# Patient Record
Sex: FEMALE | Race: WHITE | NOT HISPANIC OR LATINO | Employment: UNEMPLOYED | URBAN - METROPOLITAN AREA
[De-identification: names, ages, dates, MRNs, and addresses within clinical notes are randomized per-mention and may not be internally consistent; named-entity substitution may affect disease eponyms.]

---

## 2017-07-20 ENCOUNTER — HOSPITAL ENCOUNTER (EMERGENCY)
Facility: MEDICAL CENTER | Age: 22
End: 2017-07-20
Attending: EMERGENCY MEDICINE
Payer: MEDICAID

## 2017-07-20 VITALS
TEMPERATURE: 98 F | HEIGHT: 67 IN | DIASTOLIC BLOOD PRESSURE: 72 MMHG | RESPIRATION RATE: 14 BRPM | OXYGEN SATURATION: 99 % | WEIGHT: 170 LBS | BODY MASS INDEX: 26.68 KG/M2 | SYSTOLIC BLOOD PRESSURE: 124 MMHG | HEART RATE: 92 BPM

## 2017-07-20 DIAGNOSIS — R10.84 GENERALIZED ABDOMINAL PAIN: ICD-10-CM

## 2017-07-20 DIAGNOSIS — R11.0 NAUSEA: ICD-10-CM

## 2017-07-20 LAB
ALBUMIN SERPL BCP-MCNC: 4.6 G/DL (ref 3.2–4.9)
ALBUMIN/GLOB SERPL: 1.9 G/DL
ALP SERPL-CCNC: 107 U/L (ref 30–99)
ALT SERPL-CCNC: 9 U/L (ref 2–50)
ANION GAP SERPL CALC-SCNC: 9 MMOL/L (ref 0–11.9)
APPEARANCE UR: NORMAL
AST SERPL-CCNC: 10 U/L (ref 12–45)
BASOPHILS # BLD AUTO: 0.4 % (ref 0–1.8)
BASOPHILS # BLD: 0.04 K/UL (ref 0–0.12)
BILIRUB SERPL-MCNC: 0.2 MG/DL (ref 0.1–1.5)
BUN SERPL-MCNC: 7 MG/DL (ref 8–22)
CALCIUM SERPL-MCNC: 9.9 MG/DL (ref 8.5–10.5)
CHLORIDE SERPL-SCNC: 109 MMOL/L (ref 96–112)
CO2 SERPL-SCNC: 22 MMOL/L (ref 20–33)
COLOR UR AUTO: YELLOW
CREAT SERPL-MCNC: 0.49 MG/DL (ref 0.5–1.4)
EOSINOPHIL # BLD AUTO: 0.16 K/UL (ref 0–0.51)
EOSINOPHIL NFR BLD: 1.7 % (ref 0–6.9)
ERYTHROCYTE [DISTWIDTH] IN BLOOD BY AUTOMATED COUNT: 38 FL (ref 35.9–50)
GFR SERPL CREATININE-BSD FRML MDRD: >60 ML/MIN/1.73 M 2
GLOBULIN SER CALC-MCNC: 2.4 G/DL (ref 1.9–3.5)
GLUCOSE SERPL-MCNC: 102 MG/DL (ref 65–99)
GLUCOSE UR STRIP.AUTO-MCNC: NEGATIVE MG/DL
HCG UR QL: NEGATIVE
HCT VFR BLD AUTO: 44.8 % (ref 37–47)
HGB BLD-MCNC: 15 G/DL (ref 12–16)
IMM GRANULOCYTES # BLD AUTO: 0.01 K/UL (ref 0–0.11)
IMM GRANULOCYTES NFR BLD AUTO: 0.1 % (ref 0–0.9)
KETONES UR STRIP.AUTO-MCNC: NEGATIVE MG/DL
LEUKOCYTE ESTERASE UR QL STRIP.AUTO: NEGATIVE
LIPASE SERPL-CCNC: 23 U/L (ref 11–82)
LYMPHOCYTES # BLD AUTO: 3.41 K/UL (ref 1–4.8)
LYMPHOCYTES NFR BLD: 37 % (ref 22–41)
MCH RBC QN AUTO: 29.2 PG (ref 27–33)
MCHC RBC AUTO-ENTMCNC: 33.5 G/DL (ref 33.6–35)
MCV RBC AUTO: 87.2 FL (ref 81.4–97.8)
MONOCYTES # BLD AUTO: 0.44 K/UL (ref 0–0.85)
MONOCYTES NFR BLD AUTO: 4.8 % (ref 0–13.4)
NEUTROPHILS # BLD AUTO: 5.15 K/UL (ref 2–7.15)
NEUTROPHILS NFR BLD: 56 % (ref 44–72)
NITRITE UR QL STRIP.AUTO: NEGATIVE
NRBC # BLD AUTO: 0 K/UL
NRBC BLD AUTO-RTO: 0 /100 WBC
PH UR STRIP.AUTO: 7 [PH] (ref 5–8)
PLATELET # BLD AUTO: 200 K/UL (ref 164–446)
PMV BLD AUTO: 11.3 FL (ref 9–12.9)
POTASSIUM SERPL-SCNC: 4 MMOL/L (ref 3.6–5.5)
PROT SERPL-MCNC: 7 G/DL (ref 6–8.2)
PROT UR QL STRIP: NEGATIVE MG/DL
RBC # BLD AUTO: 5.14 M/UL (ref 4.2–5.4)
RBC UR QL AUTO: NEGATIVE
SODIUM SERPL-SCNC: 140 MMOL/L (ref 135–145)
SP GR UR STRIP.AUTO: 1.01
WBC # BLD AUTO: 9.2 K/UL (ref 4.8–10.8)

## 2017-07-20 PROCEDURE — 81002 URINALYSIS NONAUTO W/O SCOPE: CPT

## 2017-07-20 PROCEDURE — 700111 HCHG RX REV CODE 636 W/ 250 OVERRIDE (IP): Performed by: EMERGENCY MEDICINE

## 2017-07-20 PROCEDURE — 85025 COMPLETE CBC W/AUTO DIFF WBC: CPT

## 2017-07-20 PROCEDURE — 81025 URINE PREGNANCY TEST: CPT

## 2017-07-20 PROCEDURE — 99284 EMERGENCY DEPT VISIT MOD MDM: CPT

## 2017-07-20 PROCEDURE — 83690 ASSAY OF LIPASE: CPT

## 2017-07-20 PROCEDURE — 36415 COLL VENOUS BLD VENIPUNCTURE: CPT

## 2017-07-20 PROCEDURE — 80053 COMPREHEN METABOLIC PANEL: CPT

## 2017-07-20 RX ORDER — ONDANSETRON 4 MG/1
4 TABLET, ORALLY DISINTEGRATING ORAL ONCE
Status: COMPLETED | OUTPATIENT
Start: 2017-07-20 | End: 2017-07-20

## 2017-07-20 RX ORDER — ONDANSETRON 4 MG/1
4 TABLET, ORALLY DISINTEGRATING ORAL EVERY 8 HOURS PRN
Qty: 10 TAB | Refills: 0 | Status: SHIPPED | OUTPATIENT
Start: 2017-07-20 | End: 2017-10-17

## 2017-07-20 RX ADMIN — ONDANSETRON 4 MG: 4 TABLET, ORALLY DISINTEGRATING ORAL at 22:08

## 2017-07-20 NOTE — ED AVS SNAPSHOT
Home Care Instructions                                                                                                                Karlie Santos   MRN: [id number]    Department:  [facility], Emergency Dept   Date of Visit:  7/20/2017            [facility], Emergency Dept    [address] [address]    Phone:  [phone]      You were seen by     Nicolasa Esquivel M.D.      Your Diagnosis Was     Nausea     R11.0       Follow-up Information     1. Schedule an appointment as soon as possible for a visit with Supriya Macias M.D..    Specialty:  Family Medicine    Contact information    [address]  [address]  [facility] [address]  [phone]          2. Follow up with [facility], Emergency Dept.    Specialty:  Emergency Medicine    Why:  If symptoms worsen    Contact information    [address] [address] [phone]      Medication Information     Review all of your home medications and newly ordered medications with your primary doctor and/or pharmacist as soon as possible. Follow medication instructions as directed by your doctor and/or pharmacist.     Please keep your complete medication list with you and share with your physician. Update the information when medications are discontinued, doses are changed, or new medications (including over-the-counter products) are added; and carry medication information at all times in the event of emergency situations.               Medication List      START taking these medications        Instructions    Morning Afternoon Evening Bedtime    ondansetron 4 MG Tbdp   Commonly known as:  ZOFRAN ODT        Take 1 Tab by mouth every 8 hours as needed for Nausea/Vomiting.   Dose:  4 mg                          ASK your doctor about these medications        Instructions    Morning Afternoon Evening Bedtime     MG Caps        Take 100 mg by mouth 2 times a day as needed for  Constipation.   Dose:  100 mg                        oxycodone-acetaminophen 5-325 MG Tabs   Commonly known as:  PERCOCET        Take 1 Tab by mouth every four hours as needed for Moderate Pain ((Pain Scale 4-6)).   Dose:  1 Tab                        PRENATAL 1 PO        Take  by mouth.                             Where to Get Your Medications      You can get these medications from any pharmacy     Bring a paper prescription for each of these medications    - ondansetron 4 MG Tbdp            Procedures and tests performed during your visit     CARDIAC MONITORING    CBC WITH DIFFERENTIAL    COMP METABOLIC PANEL    ESTIMATED GFR    LIPASE    O2 Protocol    POC URINALYSIS    POC URINE PREGNANCY    SALINE LOCK        Discharge Instructions       Abdominal Pain, Women  Abdominal (stomach, pelvic, or belly) pain can be caused by many things. It is important to tell your doctor:  · The location of the pain.  · Does it come and go or is it present all the time?  · Are there things that start the pain (eating certain foods, exercise)?  · Are there other symptoms associated with the pain (fever, nausea, vomiting, diarrhea)?  All of this is helpful to know when trying to find the cause of the pain.  CAUSES   · Stomach: virus or bacteria infection, or ulcer.  · Intestine: appendicitis (inflamed appendix), regional ileitis (Crohn's disease), ulcerative colitis (inflamed colon), irritable bowel syndrome, diverticulitis (inflamed diverticulum of the colon), or cancer of the stomach or intestine.  · Gallbladder disease or stones in the gallbladder.  · Kidney disease, kidney stones, or infection.  · Pancreas infection or cancer.  · Fibromyalgia (pain disorder).  · Diseases of the female organs:  · Uterus: fibroid (non-cancerous) tumors or infection.  · Fallopian tubes: infection or tubal pregnancy.  · Ovary: cysts or tumors.  · Pelvic adhesions (scar tissue).  · Endometriosis (uterus lining tissue growing in the pelvis and on the  pelvic organs).  · Pelvic congestion syndrome (female organs filling up with blood just before the menstrual period).  · Pain with the menstrual period.  · Pain with ovulation (producing an egg).  · Pain with an IUD (intrauterine device, birth control) in the uterus.  · Cancer of the female organs.  · Functional pain (pain not caused by a disease, may improve without treatment).  · Psychological pain.  · Depression.  DIAGNOSIS   Your doctor will decide the seriousness of your pain by doing an examination.  · Blood tests.  · X-rays.  · Ultrasound.  · CT scan (computed tomography, special type of X-ray).  · MRI (magnetic resonance imaging).  · Cultures, for infection.  · Barium enema (dye inserted in the large intestine, to better view it with X-rays).  · Colonoscopy (looking in intestine with a lighted tube).  · Laparoscopy (minor surgery, looking in abdomen with a lighted tube).  · Major abdominal exploratory surgery (looking in abdomen with a large incision).  TREATMENT   The treatment will depend on the cause of the pain.   · Many cases can be observed and treated at home.  · Over-the-counter medicines recommended by your caregiver.  · Prescription medicine.  · Antibiotics, for infection.  · Birth control pills, for painful periods or for ovulation pain.  · Hormone treatment, for endometriosis.  · Nerve blocking injections.  · Physical therapy.  · Antidepressants.  · Counseling with a psychologist or psychiatrist.  · Minor or major surgery.  HOME CARE INSTRUCTIONS   · Do not take laxatives, unless directed by your caregiver.  · Take over-the-counter pain medicine only if ordered by your caregiver. Do not take aspirin because it can cause an upset stomach or bleeding.  · Try a clear liquid diet (broth or water) as ordered by your caregiver. Slowly move to a bland diet, as tolerated, if the pain is related to the stomach or intestine.  · Have a thermometer and take your temperature several times a day, and record  it.  · Bed rest and sleep, if it helps the pain.  · Avoid sexual intercourse, if it causes pain.  · Avoid stressful situations.  · Keep your follow-up appointments and tests, as your caregiver orders.  · If the pain does not go away with medicine or surgery, you may try:  · Acupuncture.  · Relaxation exercises (yoga, meditation).  · Group therapy.  · Counseling.  SEEK MEDICAL CARE IF:   · You notice certain foods cause stomach pain.  · Your home care treatment is not helping your pain.  · You need stronger pain medicine.  · You want your IUD removed.  · You feel faint or lightheaded.  · You develop nausea and vomiting.  · You develop a rash.  · You are having side effects or an allergy to your medicine.  SEEK IMMEDIATE MEDICAL CARE IF:   · Your pain does not go away or gets worse.  · You have a fever.  · Your pain is felt only in portions of the abdomen. The right side could possibly be appendicitis. The left lower portion of the abdomen could be colitis or diverticulitis.  · You are passing blood in your stools (bright red or black tarry stools, with or without vomiting).  · You have blood in your urine.  · You develop chills, with or without a fever.  · You pass out.  MAKE SURE YOU:   · Understand these instructions.  · Will watch your condition.  · Will get help right away if you are not doing well or get worse.  Document Released: 10/14/2008 Document Revised: 03/11/2013 Document Reviewed: 11/04/2010  ExitCare® Patient Information ©2014 [employer].            Patient Information     Patient Information    Following emergency treatment: all patient requiring follow-up care must return either to a private physician or a clinic if your condition worsens before you are able to obtain further medical attention, please return to the emergency room.     Billing Information    At [facility], we work to make the billing process streamlined for our patients.  Our Representatives are here to answer any questions you  may have regarding your hospital bill.  If you have insurance coverage and have supplied your insurance information to us, we will submit a claim to your insurer on your behalf.  Should you have any questions regarding your bill, we can be reached online or by phone as follows:  Online: You are able pay your bills online or live chat with our representatives about any billing questions you may have. We are here to help Monday - Friday from 8:00am to 7:30pm and 9:00am - 12:00pm on Saturdays.  Please visit [email or web address]  for more information.   Phone:  [phone] or [phone]    Please note that your emergency physician, surgeon, pathologist, radiologist, anesthesiologist, and other specialists are not employed by [facility] and will therefore bill separately for their services.  Please contact them directly for any questions concerning their bills at the numbers below:     Emergency Physician Services:  [phone]  [facility]:  [phone]  [facility]:  [phone]  [facility]:  [phone]    1. Your final bill may vary from the amount quoted upon discharge if all procedures are not complete at that time, or if your doctor has additional procedures of which we are not aware. You will receive an additional bill if you return to the Emergency Department at [facility] for suture removal regardless of the facility of which the sutures were placed.     2. Please arrange for settlement of this account at the emergency registration.    3. All self-pay accounts are due in full at the time of treatment.  If you are unable to meet this obligation then payment is expected within 4-5 days.     4. If you have had radiology studies (CT, X-ray, Ultrasound, MRI), you have received a preliminary result during your emergency department visit. Please contact the radiology department [phone] to receive a copy of your final  result. Please discuss the Final result with your primary physician or with the follow up physician provided.     Crisis Hotline:  [place] Crisis Hotline:  1-916-AOOGNJM or 1-715.355.9284  [place] Crisis Hotline:    1-525.713.4562 or 397-122-4141         ED Discharge Follow Up Questions    1. In order to provide you with very good care, we would like to follow up with a phone call in the next few days.  May we have your permission to contact you?     YES /  NO    2. What is the best phone number to call you? (       )_____-__________    3. What is the best time to call you?      Morning  /  Afternoon  /  Evening                   Patient Signature:  ____________________________________________________________    Date:  ____________________________________________________________

## 2017-07-20 NOTE — ED AVS SNAPSHOT
7/20/2017    Karlie Santos  [address] [address]    Dear Karlie:    [facility] wants to ensure your discharge home is safe and you or your loved ones have had all of your questions answered regarding your care after you leave the hospital.    Below is a list of resources and contact information should you have any questions regarding your hospital stay, follow-up instructions, or active medical symptoms.    Questions or Concerns Regarding… Contact   Medical Questions Related to Your Discharge  (7 days a week, 8am-5pm) Contact a Nurse Care Coordinator   [phone]   Medical Questions Not Related to Your Discharge  (24 hours a day / 7 days a week)  Contact the Nurse Health Line   [phone]    Medications or Discharge Instructions Refer to your discharge packet   or contact your [facility] Primary Care Provider   [phone]   Follow-up Appointment(s) Schedule your appointment via "[employer]   or contact Scheduling [phone]   Billing Review your statement via "[employer]  or contact Billing [phone]   Medical Records Review your records via "[employer]   or contact Medical Records [phone]     You may receive a telephone call within two days of discharge. This call is to make certain you understand your discharge instructions and have the opportunity to have any questions answered. You can also easily access your medical information, test results and upcoming appointments via the "[employer] free online health management tool. You can learn more and sign up at [employer]. For assistance setting up your "[employer] account, please call [phone].    Once again, we want to ensure your discharge home is safe and that you have a clear understanding of any next steps in your care. If you have any questions or concerns, please do not hesitate to contact us, we are here for you. Thank you for choosing [facility] for your healthcare needs.    Sincerely,    Your [facility] Healthcare Team

## 2017-07-20 NOTE — ED AVS SNAPSHOT
Pay with a Tweet Access Code: [email or web address]  Expires: 8/19/2017 10:05 PM    Pay with a Tweet  A secure, online tool to manage your health information     [employer] Pay with a Tweet® is a secure, online tool that connects you to your personalized health information from the privacy of your home -- day or night - making it very easy for you to manage your healthcare. Once the activation process is completed, you can even access your medical information using the Pay with a Tweet bobo, which is available for free in the Apple Bobo store or Google Play store.     Pay with a Tweet provides the following levels of access (as shown below):   My Chart Features   [facility] Primary Care Doctor [facility]  Specialists [facility]  Urgent  Care Non-[facility]  Primary Care  Doctor   Email your healthcare team securely and privately 24/7 X X X X   Manage appointments: schedule your next appointment; view details of past/upcoming appointments X      Request prescription refills. X      View recent personal medical records, including lab and immunizations X X X X   View health record, including health history, allergies, medications X X X X   Read reports about your outpatient visits, procedures, consult and ER notes X X X X   See your discharge summary, which is a recap of your hospital and/or ER visit that includes your diagnosis, lab results, and care plan. X X       How to register for Pay with a Tweet:  1. Go to  [email or web address].  2. Click on the Sign Up Now box, which takes you to the New Member Sign Up page. You will need to provide the following information:  a. Enter your Pay with a Tweet Access Code exactly as it appears at the top of this page. (You will not need to use this code after you’ve completed the sign-up process. If you do not sign up before the expiration date, you must request a new code.)   b. Enter your date of birth.   c. Enter your home email address.   d. Click Submit, and follow the next screen’s instructions.  3. Create a Pay with a Tweet ID. This will be your [employer]  login ID and cannot be changed, so think of one that is secure and easy to remember.  4. Create a [employer] password. You can change your password at any time.  5. Enter your Password Reset Question and Answer. This can be used at a later time if you forget your password.   6. Enter your e-mail address. This allows you to receive e-mail notifications when new information is available in [employer].  7. Click Sign Up. You can now view your health information.    For assistance activating your [employer] account, call [phone]

## 2017-07-21 NOTE — ED NOTES
Pt discharged home   . Discharge instructions reviewed with patient, all questions answered.   Pt ambulatory, vital signs stable.   Pt states they have a safe way home.

## 2017-07-21 NOTE — DISCHARGE INSTRUCTIONS
Abdominal Pain, Women  Abdominal (stomach, pelvic, or belly) pain can be caused by many things. It is important to tell your doctor:  · The location of the pain.  · Does it come and go or is it present all the time?  · Are there things that start the pain (eating certain foods, exercise)?  · Are there other symptoms associated with the pain (fever, nausea, vomiting, diarrhea)?  All of this is helpful to know when trying to find the cause of the pain.  CAUSES   · Stomach: virus or bacteria infection, or ulcer.  · Intestine: appendicitis (inflamed appendix), regional ileitis (Crohn's disease), ulcerative colitis (inflamed colon), irritable bowel syndrome, diverticulitis (inflamed diverticulum of the colon), or cancer of the stomach or intestine.  · Gallbladder disease or stones in the gallbladder.  · Kidney disease, kidney stones, or infection.  · Pancreas infection or cancer.  · Fibromyalgia (pain disorder).  · Diseases of the female organs:  · Uterus: fibroid (non-cancerous) tumors or infection.  · Fallopian tubes: infection or tubal pregnancy.  · Ovary: cysts or tumors.  · Pelvic adhesions (scar tissue).  · Endometriosis (uterus lining tissue growing in the pelvis and on the pelvic organs).  · Pelvic congestion syndrome (female organs filling up with blood just before the menstrual period).  · Pain with the menstrual period.  · Pain with ovulation (producing an egg).  · Pain with an IUD (intrauterine device, birth control) in the uterus.  · Cancer of the female organs.  · Functional pain (pain not caused by a disease, may improve without treatment).  · Psychological pain.  · Depression.  DIAGNOSIS   Your doctor will decide the seriousness of your pain by doing an examination.  · Blood tests.  · X-rays.  · Ultrasound.  · CT scan (computed tomography, special type of X-ray).  · MRI (magnetic resonance imaging).  · Cultures, for infection.  · Barium enema (dye inserted in the large intestine, to better view it with  X-rays).  · Colonoscopy (looking in intestine with a lighted tube).  · Laparoscopy (minor surgery, looking in abdomen with a lighted tube).  · Major abdominal exploratory surgery (looking in abdomen with a large incision).  TREATMENT   The treatment will depend on the cause of the pain.   · Many cases can be observed and treated at home.  · Over-the-counter medicines recommended by your caregiver.  · Prescription medicine.  · Antibiotics, for infection.  · Birth control pills, for painful periods or for ovulation pain.  · Hormone treatment, for endometriosis.  · Nerve blocking injections.  · Physical therapy.  · Antidepressants.  · Counseling with a psychologist or psychiatrist.  · Minor or major surgery.  HOME CARE INSTRUCTIONS   · Do not take laxatives, unless directed by your caregiver.  · Take over-the-counter pain medicine only if ordered by your caregiver. Do not take aspirin because it can cause an upset stomach or bleeding.  · Try a clear liquid diet (broth or water) as ordered by your caregiver. Slowly move to a bland diet, as tolerated, if the pain is related to the stomach or intestine.  · Have a thermometer and take your temperature several times a day, and record it.  · Bed rest and sleep, if it helps the pain.  · Avoid sexual intercourse, if it causes pain.  · Avoid stressful situations.  · Keep your follow-up appointments and tests, as your caregiver orders.  · If the pain does not go away with medicine or surgery, you may try:  · Acupuncture.  · Relaxation exercises (yoga, meditation).  · Group therapy.  · Counseling.  SEEK MEDICAL CARE IF:   · You notice certain foods cause stomach pain.  · Your home care treatment is not helping your pain.  · You need stronger pain medicine.  · You want your IUD removed.  · You feel faint or lightheaded.  · You develop nausea and vomiting.  · You develop a rash.  · You are having side effects or an allergy to your medicine.  SEEK IMMEDIATE MEDICAL CARE IF:   · Your  pain does not go away or gets worse.  · You have a fever.  · Your pain is felt only in portions of the abdomen. The right side could possibly be appendicitis. The left lower portion of the abdomen could be colitis or diverticulitis.  · You are passing blood in your stools (bright red or black tarry stools, with or without vomiting).  · You have blood in your urine.  · You develop chills, with or without a fever.  · You pass out.  MAKE SURE YOU:   · Understand these instructions.  · Will watch your condition.  · Will get help right away if you are not doing well or get worse.  Document Released: 10/14/2008 Document Revised: 03/11/2013 Document Reviewed: 11/04/2010  [employer]® Patient Information ©2014 [email or web address].

## 2017-07-21 NOTE — ED PROVIDER NOTES
"ED Provider Note    Scribed for Nicolasa Esquivel M.D. by Sandra Luque. 7/20/2017, 9:55 PM.    Primary care provider: Supriya Macias M.D.  Means of arrival: Walk-in  History obtained from: Patient  History limited by: Patient     CHIEF COMPLAINT  Chief Complaint   Patient presents with   • Abdominal Pain   • N/V     HPI  Karlie Santos is a 22 y.o. female who presents to the Emergency Department for intermittent, diffuse, abdominal pain with associated nausea onset three weeks ago. She describes the pain as, \"movement\" and believes that she may be pregnant. The patient reports that her last menstrual period was one month ago and was much shorter than normal. She denies any recent fevers, vaginal bleeding, vomiting or diarrhea. She states that she had a normal bowel movement yesterday. Per patient she smokes cigarettes daily.     REVIEW OF SYSTEMS  See HPI for further details. All other systems are negative.   C    PAST MEDICAL HISTORY   no significant past medical history obtained.     SURGICAL HISTORY   has past surgical history that includes other orthopedic surgery and primary c section (Bilateral, 7/16/2016).    SOCIAL HISTORY  Social History   Substance Use Topics   • Smoking status: Current Every Day Smoker -- 0.50 packs/day     Types: Cigarettes     Start date: 01/01/2008   • Alcohol Use: No      History   Drug Use   • Yes   • Special: Inhaled     Comment: Marijuana - Last used when found out she was pregnant     FAMILY HISTORY  No family history noted    CURRENT MEDICATIONS  Reviewed. See Encounter Summary.     ALLERGIES  No Known Allergies    PHYSICAL EXAM  VITAL SIGNS: /86 mmHg  Pulse 96  Temp(Src) 36.4 °C (97.6 °F)  Resp 16  Ht 1.702 m (5' 7\")  Wt 77.111 kg (170 lb)  BMI 26.62 kg/m2  SpO2 99%   Pulse ox interpretation: I interpret this pulse ox as normal.  Constitutional: Alert in no apparent distress.  HENT: No signs of trauma, Bilateral external ears normal, Nose normal.   Eyes: " Pupils are equal and reactive, Conjunctiva normal, Non-icteric.   Neck: Normal range of motion, No tenderness, Supple, No stridor.   Lymphatic: No lymphadenopathy noted.   Cardiovascular: Regular rate and rhythm, no murmurs.   Thorax & Lungs: Normal breath sounds, No respiratory distress, No wheezing, No chest tenderness.   Abdomen: Bowel sounds normal, Soft, No tenderness, No masses, No pulsatile masses. No peritoneal signs.  Skin: Warm, Dry, No erythema, No rash.   Back: No bony tenderness, No CVA tenderness.   Extremities: Intact distal pulses, No edema, No tenderness, No cyanosis,  Negative Nils's sign.   Musculoskeletal: Good range of motion in all major joints. No tenderness to palpation or major deformities noted.   Neurologic: Alert , Normal motor function, Normal sensory function, No focal deficits noted.   Psychiatric: Affect normal, Judgment normal, Mood normal.     DIFFERENTIAL DIAGNOSIS AND WORK UP PLAN    9:55 PM Patient seen and examined at bedside. The patient presents with mild abdominal pain and the differential diagnosis includes but is not limited to pregnancy, constipation, early appendicitis, unlikely, gastritis, urinary tract infection . Ordered for POC urine pregnancy, Estimated GFR, CBC with diff, CMP, Lipase, POC urinalysis to evaluate. Patient will be treated with 4 mg Zofran PO for her symptoms. Discussed tobacco cessation with patient.         DIAGNOSTIC STUDIES / PROCEDURES     LABS   CBC CMP and lipase urinalysis and pregnancy test all within normal limits beyond a mildly elevated alk phos patient is not pregnant  All labs were reviewed by me.    COURSE & MEDICAL DECISION MAKING  Pertinent Labs & Imaging studies reviewed. (See chart for details)    9:59 PM - Re-examined; The patient is resting in bed talking to her boyfriend. I discussed her above findings were overall unremarkable for pregnancy and plans for discharge with prescription for zofran. She was given a referral to  "Supriya Macias M.D. and instructed to return to the ED if her symptoms worsen including fevers, right lower quadrant abdominal pain or worsening pain. Patient understands and agrees.     This is a 22 y.o. year old female who presents with generalized abdominal pain possible viral syndrome versus early appendectomy though unlikely patient's abdomen is quite soft without an elevated white blood cell count fever or left shift. She was given strict return precautions which include worsening pain fevers or vomiting despite Zofran at home.  /72 mmHg  Pulse 92  Temp(Src) 36.7 °C (98 °F)  Resp 14  Ht 1.702 m (5' 7\")  Wt 77.111 kg (170 lb)  BMI 26.62 kg/m2  SpO2 99%    Discussed with the patient for ~ 5-10 minutes regarding their tobacco use - including side effects and risks of use including but not limited to cancer, lung difficulties (emphysema, COPD), and early aging. The patient did not accept information/help with quitting today.    The patient will not drink alcohol nor drive with prescribed medications. The patient will return for new or worsening symptoms and is stable at the time of discharge.    The patient is referred to a primary physician for blood pressure management, diabetic screening, and for all other preventative health concerns.    DISPOSITION:  Patient will be discharged home in stable condition.    FOLLOW UP:  Supriya Macias M.D.  [id number]  [address]  [facility] [address]  [phone]    Schedule an appointment as soon as possible for a visit      [facility], Emergency Dept  [address] [facility] [id number] [phone]    If symptoms worsen    OUTPATIENT MEDICATIONS:  New Prescriptions    ONDANSETRON (ZOFRAN ODT) 4 MG TABLET DISPERSIBLE    Take 1 Tab by mouth every 8 hours as needed for Nausea/Vomiting.     FINAL IMPRESSION  1. Nausea    2. Generalized abdominal pain         I, Sandra Luque (Scribe), am scribing for, and in the presence of, " Nicolasa Esquivel M.D..    Electronically signed by: Sandra Luque (Scribe), 7/20/2017    I, Nicolasa Esquivel M.D. personally performed the services described in this documentation, as scribed by Sandra Luque in my presence, and it is both accurate and complete.    The note accurately reflects work and decisions made by me.  Nicolasa Esquivel  7/21/2017  3:38 AM    This dictation has been created using voice recognition software and/or scribes. The accuracy of the dictation is limited by the abilities of the software and the expertise of the scribes. I expect there may be some errors of grammar and possibly content. I made every attempt to manually correct the errors within my dictation. However, errors related to voice recognition software and/or scribes may still exist and should be interpreted within the appropriate context.

## 2017-10-08 ENCOUNTER — HOSPITAL ENCOUNTER (OUTPATIENT)
Facility: MEDICAL CENTER | Age: 22
End: 2017-10-08
Attending: EMERGENCY MEDICINE
Payer: MEDICAID

## 2017-10-08 LAB — B-HCG SERPL-ACNC: ABNORMAL MIU/ML (ref 0–5)

## 2017-10-08 PROCEDURE — 84702 CHORIONIC GONADOTROPIN TEST: CPT

## 2017-10-08 PROCEDURE — 36415 COLL VENOUS BLD VENIPUNCTURE: CPT

## 2017-10-17 ENCOUNTER — APPOINTMENT (OUTPATIENT)
Dept: RADIOLOGY | Facility: MEDICAL CENTER | Age: 22
End: 2017-10-17
Attending: EMERGENCY MEDICINE
Payer: MEDICAID

## 2017-10-17 ENCOUNTER — HOSPITAL ENCOUNTER (EMERGENCY)
Facility: MEDICAL CENTER | Age: 22
End: 2017-10-18
Attending: EMERGENCY MEDICINE
Payer: MEDICAID

## 2017-10-17 DIAGNOSIS — O03.9 MISCARRIAGE: ICD-10-CM

## 2017-10-17 LAB
ABO GROUP BLD: NORMAL
APPEARANCE UR: CLEAR
B-HCG SERPL-ACNC: ABNORMAL MIU/ML (ref 0–10)
BACTERIA #/AREA URNS HPF: ABNORMAL /HPF
BACTERIA GENITAL QL WET PREP: NORMAL
BASOPHILS # BLD AUTO: 0.5 % (ref 0–1.8)
BASOPHILS # BLD: 0.05 K/UL (ref 0–0.12)
BILIRUB UR QL STRIP.AUTO: NEGATIVE
BLD GP AB SCN SERPL QL: NORMAL
COLOR UR: YELLOW
CULTURE IF INDICATED INDCX: NO UA CULTURE
EOSINOPHIL # BLD AUTO: 0.21 K/UL (ref 0–0.51)
EOSINOPHIL NFR BLD: 2.2 % (ref 0–6.9)
EPI CELLS #/AREA URNS HPF: ABNORMAL /HPF
ERYTHROCYTE [DISTWIDTH] IN BLOOD BY AUTOMATED COUNT: 40.6 FL (ref 35.9–50)
GLUCOSE UR STRIP.AUTO-MCNC: NEGATIVE MG/DL
HCT VFR BLD AUTO: 37.9 % (ref 37–47)
HGB BLD-MCNC: 13.1 G/DL (ref 12–16)
IMM GRANULOCYTES # BLD AUTO: 0.02 K/UL (ref 0–0.11)
IMM GRANULOCYTES NFR BLD AUTO: 0.2 % (ref 0–0.9)
KETONES UR STRIP.AUTO-MCNC: ABNORMAL MG/DL
LEUKOCYTE ESTERASE UR QL STRIP.AUTO: NEGATIVE
LYMPHOCYTES # BLD AUTO: 3.94 K/UL (ref 1–4.8)
LYMPHOCYTES NFR BLD: 42 % (ref 22–41)
MCH RBC QN AUTO: 30.5 PG (ref 27–33)
MCHC RBC AUTO-ENTMCNC: 34.6 G/DL (ref 33.6–35)
MCV RBC AUTO: 88.1 FL (ref 81.4–97.8)
MICRO URNS: ABNORMAL
MONOCYTES # BLD AUTO: 0.42 K/UL (ref 0–0.85)
MONOCYTES NFR BLD AUTO: 4.5 % (ref 0–13.4)
MUCOUS THREADS #/AREA URNS HPF: ABNORMAL /HPF
NEUTROPHILS # BLD AUTO: 4.75 K/UL (ref 2–7.15)
NEUTROPHILS NFR BLD: 50.6 % (ref 44–72)
NITRITE UR QL STRIP.AUTO: NEGATIVE
NRBC # BLD AUTO: 0 K/UL
NRBC BLD AUTO-RTO: 0 /100 WBC
PH UR STRIP.AUTO: 5.5 [PH]
PLATELET # BLD AUTO: 234 K/UL (ref 164–446)
PMV BLD AUTO: 10.1 FL (ref 9–12.9)
PROT UR QL STRIP: NEGATIVE MG/DL
RBC # BLD AUTO: 4.3 M/UL (ref 4.2–5.4)
RBC # URNS HPF: ABNORMAL /HPF
RBC UR QL AUTO: ABNORMAL
RH BLD: NORMAL
SIGNIFICANT IND 70042: NORMAL
SITE SITE: NORMAL
SOURCE SOURCE: NORMAL
SP GR UR REFRACTOMETRY: 1.03
UNIDENT CRYS URNS QL MICRO: ABNORMAL /HPF
WBC # BLD AUTO: 9.4 K/UL (ref 4.8–10.8)
WBC #/AREA URNS HPF: ABNORMAL /HPF

## 2017-10-17 PROCEDURE — 86900 BLOOD TYPING SEROLOGIC ABO: CPT

## 2017-10-17 PROCEDURE — 99285 EMERGENCY DEPT VISIT HI MDM: CPT

## 2017-10-17 PROCEDURE — 76817 TRANSVAGINAL US OBSTETRIC: CPT

## 2017-10-17 PROCEDURE — 87491 CHLMYD TRACH DNA AMP PROBE: CPT

## 2017-10-17 PROCEDURE — 81001 URINALYSIS AUTO W/SCOPE: CPT

## 2017-10-17 PROCEDURE — 84702 CHORIONIC GONADOTROPIN TEST: CPT

## 2017-10-17 PROCEDURE — 87591 N.GONORRHOEAE DNA AMP PROB: CPT

## 2017-10-17 PROCEDURE — 86850 RBC ANTIBODY SCREEN: CPT

## 2017-10-17 PROCEDURE — 86901 BLOOD TYPING SEROLOGIC RH(D): CPT

## 2017-10-17 PROCEDURE — 85025 COMPLETE CBC W/AUTO DIFF WBC: CPT

## 2017-10-17 RX ORDER — HYDROCODONE BITARTRATE AND ACETAMINOPHEN 5; 325 MG/1; MG/1
1-2 TABLET ORAL EVERY 4 HOURS PRN
Status: SHIPPED | COMMUNITY
End: 2018-11-27

## 2017-10-17 RX ORDER — MISOPROSTOL 100 UG/1
100 TABLET ORAL 4 TIMES DAILY
Status: SHIPPED | COMMUNITY
End: 2018-11-27

## 2017-10-17 RX ORDER — SODIUM CHLORIDE 9 MG/ML
1000 INJECTION, SOLUTION INTRAVENOUS ONCE
Status: COMPLETED | OUTPATIENT
Start: 2017-10-17 | End: 2017-10-18

## 2017-10-17 ASSESSMENT — PAIN SCALES - GENERAL: PAINLEVEL_OUTOF10: 8

## 2017-10-18 VITALS
BODY MASS INDEX: 24.36 KG/M2 | OXYGEN SATURATION: 96 % | WEIGHT: 155.2 LBS | TEMPERATURE: 97.7 F | HEIGHT: 67 IN | SYSTOLIC BLOOD PRESSURE: 118 MMHG | HEART RATE: 86 BPM | DIASTOLIC BLOOD PRESSURE: 61 MMHG | RESPIRATION RATE: 18 BRPM

## 2017-10-18 LAB
C TRACH DNA SPEC QL NAA+PROBE: NEGATIVE
N GONORRHOEA DNA SPEC QL NAA+PROBE: NEGATIVE
SPECIMEN SOURCE: NORMAL

## 2017-10-18 PROCEDURE — 700102 HCHG RX REV CODE 250 W/ 637 OVERRIDE(OP): Performed by: EMERGENCY MEDICINE

## 2017-10-18 PROCEDURE — A9270 NON-COVERED ITEM OR SERVICE: HCPCS | Performed by: EMERGENCY MEDICINE

## 2017-10-18 PROCEDURE — 700105 HCHG RX REV CODE 258: Performed by: EMERGENCY MEDICINE

## 2017-10-18 PROCEDURE — 96374 THER/PROPH/DIAG INJ IV PUSH: CPT

## 2017-10-18 PROCEDURE — 700111 HCHG RX REV CODE 636 W/ 250 OVERRIDE (IP): Performed by: EMERGENCY MEDICINE

## 2017-10-18 PROCEDURE — 36415 COLL VENOUS BLD VENIPUNCTURE: CPT

## 2017-10-18 RX ORDER — ONDANSETRON 2 MG/ML
4 INJECTION INTRAMUSCULAR; INTRAVENOUS ONCE
Status: COMPLETED | OUTPATIENT
Start: 2017-10-18 | End: 2017-10-18

## 2017-10-18 RX ORDER — OXYCODONE HYDROCHLORIDE 5 MG/1
5 TABLET ORAL ONCE
Status: COMPLETED | OUTPATIENT
Start: 2017-10-18 | End: 2017-10-18

## 2017-10-18 RX ORDER — ONDANSETRON 2 MG/ML
INJECTION INTRAMUSCULAR; INTRAVENOUS
Status: COMPLETED
Start: 2017-10-18 | End: 2017-10-18

## 2017-10-18 RX ADMIN — ONDANSETRON 4 MG: 2 INJECTION INTRAMUSCULAR; INTRAVENOUS at 00:24

## 2017-10-18 RX ADMIN — OXYCODONE HYDROCHLORIDE 5 MG: 5 TABLET ORAL at 00:24

## 2017-10-18 RX ADMIN — SODIUM CHLORIDE 1000 ML: 900 INJECTION, SOLUTION INTRAVENOUS at 00:01

## 2017-10-18 ASSESSMENT — PAIN SCALES - GENERAL: PAINLEVEL_OUTOF10: ASSUMED PAIN PRESENT

## 2017-10-18 NOTE — DISCHARGE INSTRUCTIONS
"You were seen in the ER for vaginal bleeding that is due to a miscarriage. Your labs and imaging did not reveal an acute problem and you have asked to go home which I think is appropriate provided you follow up with the family medicine clinic later on today. I have already spoken with her on-call physician who will email the , but it is important to call this morning to make an appointment for a recheck.    Please return to the ER with any new or worsening symptoms.    Miscarriage  A miscarriage is the sudden loss of an unborn baby (fetus) before the 20th week of pregnancy. Most miscarriages happen in the first 3 months of pregnancy. Sometimes, it happens before a woman even knows she is pregnant. A miscarriage is also called a \"spontaneous miscarriage\" or \"early pregnancy loss.\" Having a miscarriage can be an emotional experience. Talk with your caregiver about any questions you may have about miscarrying, the grieving process, and your future pregnancy plans.  CAUSES   · Problems with the fetal chromosomes that make it impossible for the baby to develop normally. Problems with the baby's genes or chromosomes are most often the result of errors that occur, by chance, as the embryo divides and grows. The problems are not inherited from the parents.  · Infection of the cervix or uterus.    · Hormone problems.    · Problems with the cervix, such as having an incompetent cervix. This is when the tissue in the cervix is not strong enough to hold the pregnancy.    · Problems with the uterus, such as an abnormally shaped uterus, uterine fibroids, or congenital abnormalities.    · Certain medical conditions.    · Smoking, drinking alcohol, or taking illegal drugs.    · Trauma.    Often, the cause of a miscarriage is unknown.   SYMPTOMS   · Vaginal bleeding or spotting, with or without cramps or pain.  · Pain or cramping in the abdomen or lower back.  · Passing fluid, tissue, or blood clots from the " vagina.  DIAGNOSIS   Your caregiver will perform a physical exam. You may also have an ultrasound to confirm the miscarriage. Blood or urine tests may also be ordered.  TREATMENT   · Sometimes, treatment is not necessary if you naturally pass all the fetal tissue that was in the uterus. If some of the fetus or placenta remains in the body (incomplete miscarriage), tissue left behind may become infected and must be removed. Usually, a dilation and curettage (D and C) procedure is performed. During a D and C procedure, the cervix is widened (dilated) and any remaining fetal or placental tissue is gently removed from the uterus.  · Antibiotic medicines are prescribed if there is an infection. Other medicines may be given to reduce the size of the uterus (contract) if there is a lot of bleeding.  · If you have Rh negative blood and your baby was Rh positive, you will need a Rh immunoglobulin shot. This shot will protect any future baby from having Rh blood problems in future pregnancies.  HOME CARE INSTRUCTIONS   · Your caregiver may order bed rest or may allow you to continue light activity. Resume activity as directed by your caregiver.  · Have someone help with home and family responsibilities during this time.    · Keep track of the number of sanitary pads you use each day and how soaked (saturated) they are. Write down this information.    · Do not use tampons. Do not douche or have sexual intercourse until approved by your caregiver.    · Only take over-the-counter or prescription medicines for pain or discomfort as directed by your caregiver.    · Do not take aspirin. Aspirin can cause bleeding.    · Keep all follow-up appointments with your caregiver.    · If you or your partner have problems with grieving, talk to your caregiver or seek counseling to help cope with the pregnancy loss. Allow enough time to grieve before trying to get pregnant again.    SEEK IMMEDIATE MEDICAL CARE IF:   · You have severe cramps  or pain in your back or abdomen.  · You have a fever.  · You pass large blood clots (walnut-sized or larger) or tissue from your vagina. Save any tissue for your caregiver to inspect.    · Your bleeding increases.    · You have a thick, bad-smelling vaginal discharge.  · You become lightheaded, weak, or you faint.    · You have chills.    MAKE SURE YOU:  · Understand these instructions.  · Will watch your condition.  · Will get help right away if you are not doing well or get worse.     This information is not intended to replace advice given to you by your health care provider. Make sure you discuss any questions you have with your health care provider.     Document Released: 06/13/2002 Document Revised: 04/14/2014 Document Reviewed: 02/05/2013  Elsevier Interactive Patient Education ©2016 [employer]

## 2017-10-18 NOTE — ED PROVIDER NOTES
ED Provider Note    CHIEF COMPLAINT  Chief Complaint   Patient presents with   • Vaginal Bleeding     started yesterday       HPI  Karlie Santos is a 22 y.o. female who presents with a chief complaint of Vaginal bleeding. The patient is a  with a history of 3 miscarriages. She does have a 15-month-old son who was delivered by . She is currently approximately 10 weeks pregnant with her last menstrual period starting on . She has been followed by the [facility] family medicine clinic and has had 2 ultrasounds. She was told last Friday that she was having an miscarriage after an ultrasound revealed her IUP without a heart beat. Yesterday, she was given for Mr. Peck suppositories and began to have vaginal bleeding with abdominal pain. She's been taking hydrocodone which has been minimally helpful for her symptoms. She was supposed to have a follow-up appointment at the family medicine clinic but the appointment was canceled by the clinic. She is continued to have vaginal bleeding with abdominal pain. She believes she passed the products of conception yesterday and has been bleeding today and using 1 pad per hour. Additionally, she reports alternating chills and tactile fever. The pain is cramping in located in her lower abdomen. She denies chest pain or shortness of breath, but does report some nausea and vomiting that is worse than early in her pregnancy. She denies diarrhea, constipation, or dysuria.    REVIEW OF SYSTEMS  See HPI for further details. All other systems are negative.     PAST MEDICAL HISTORY       SOCIAL HISTORY  Social History     Social History Main Topics   • Smoking status: Current Every Day Smoker     Packs/day: 0.50     Types: Cigarettes     Start date: 2008   • Smokeless tobacco: Not on file   • Alcohol use Yes   • Drug use:      Types: Inhaled      Comment: Marijuana - Last used when found out she was pregnant   • Sexual activity: Not on file       SURGICAL HISTORY   has a past  "surgical history that includes other orthopedic surgery and primary c section (Bilateral, 7/16/2016).    CURRENT MEDICATIONS  Home Medications     Reviewed by Jasmyne Belle R.N. (Registered Nurse) on 10/17/17 at 1951  Med List Status: Complete   Medication Last Dose Status   hydrocodone-acetaminophen (NORCO) 5-325 MG Tab per tablet  Active   misoprostol (CYTOTEC) 100 MCG Tab 10/16/2017 Active   oxycodone-acetaminophen (PERCOCET) 5-325 MG Tab not taking Active   Prenatal MV-Min-Fe Fum-FA-DHA (PRENATAL 1 PO) past week Active                ALLERGIES  No Known Allergies    PHYSICAL EXAM  VITAL SIGNS: /61   Pulse 86   Temp 36.5 °C (97.7 °F)   Resp 18   Ht 1.702 m (5' 7\")   Wt 70.4 kg (155 lb 3.3 oz)   LMP 07/24/2017   SpO2 96%   BMI 24.31 kg/m²    Pulse ox interpretation: I interpret this pulse ox as normal.  Constitutional: Alert in no apparent distress.  HENT: No signs of trauma, Bilateral external ears normal, Nose normal.   Eyes: Pupils are equal and reactive, Conjunctiva normal, Non-icteric.   Neck: Normal range of motion, No tenderness, Supple, No stridor.   Lymphatic: No lymphadenopathy noted.   Cardiovascular: Regular rate and rhythm, no murmurs.   Thorax & Lungs: Normal breath sounds, No respiratory distress, No wheezing, No chest tenderness.   Abdomen: Bowel sounds normal, Soft, No tenderness, No masses, No pulsatile masses. No peritoneal signs.  : Normal external female genitalia. Scant amount of blood in the vaginal vault. Cervical os is open. No clots or products of conception in the cervix or vaginal vault.  Skin: Warm, Dry, No erythema, No rash.   Back: No bony tenderness, No CVA tenderness.   Extremities: Intact distal pulses, No edema, No tenderness, No cyanosis.  Musculoskeletal: Good range of motion in all major joints. No tenderness to palpation or major deformities noted.   Neurologic: Alert , Normal motor function, Normal sensory function, No focal deficits noted.   Psychiatric: " Affect normal, Judgment normal, Mood normal.       DIAGNOSTIC STUDIES / PROCEDURES    EKG      LABS  Tablet hold  Urine analysis  Wet prep  GC chlamydia  CBC with differential  Beta hCG Quant    RADIOLOGY  Transvaginal ultrasound      COURSE & MEDICAL DECISION MAKING  Pertinent Labs & Imaging studies reviewed. (See chart for details)  This is a 22-year-old  who presents with vaginal bleeding and abdominal cramping after 1 dose of misoprostol yesterday in the setting of a transvaginal ultrasound that showed an IUP without fetal cardiac activity. The patient arrives afebrile with normal vital signs. She appears well-hydrated and nontoxic but mildly uncomfortable in the setting of her abdominal cramping. Her physical exam is remarkable for mild tenderness to palpation in her bilateral lower quadrants. Her  exam reveals scant bloody discharge in the vaginal vault with an open cervix. No clots or products of conception are visualized.    Labs reveal a stable age/age. Her urine does not suggest infection. A hCG Quant is decreasing significantly from prior. An ultrasound reveals no intrauterine or extrauterine pregnancy or retained vascular is products of conception suggesting a complete miscarriage. After returning from ultrasound, the patient's blood pressure dipped to the low 80s systolic and she had two outlier episodes of tachycardia. She was asymptomatic both of these times. She was subsequently given 1 L of normal saline and her vital signs improved. The patient's blood type is A+ and she does not require Rhogam today.    I discussed the patient's case with the family medicine resident on-call, Dr. Saeed,  who feels that the patient will be safe for discharge with outpatient follow-up later today if she is comfortable with that plan herself. Upon discussing this with the patient she is comfortable going home and reports she will follow-up with the family medicine clinic today.    The patient was discharged  in improved condition with strict return precautions and strict instructions to follow up with family medicine today.    The patient will return for worsening symptoms and is stable at the time of discharge. The patient verbalizes understanding and will comply.    FINAL IMPRESSION  1. Miscarriage        Electronically signed by: Rick Ramsay, 10/17/2017 9:16 PM

## 2017-10-18 NOTE — ED NOTES
Blood draw attempt by er tech and rn x 3 w/o successful completion of order. Lab called to assist erp aware of same.urine to lab family at bedside, npo continues

## 2017-10-18 NOTE — ED NOTES
Pt in process of miscarriage, expected 10 weeks preg, G=4, P=1. Pt in no apparent distress. S/p c-sec,

## 2017-10-18 NOTE — ED NOTES
D/c pt home, with family  . Pt aware of f/u instructions , aware to return for any changes or concerns. No further questions upon d/c home from ed

## 2017-10-18 NOTE — ED NOTES
Pt back from US, remains hypotensive but asymptomatic. IV fluids infusing, COD sent. Will cont to monitor

## 2017-10-18 NOTE — ED NOTES
"Chief Complaint   Patient presents with   • Vaginal Bleeding     started yesterday     /63   Pulse 93   Temp 36.5 °C (97.7 °F)   Resp 16   Ht 1.702 m (5' 7\")   LMP 07/24/2017   SpO2 99%     Pt was started on medication yesterday s/p miscarriage, states has increased bleeding today  "

## 2017-10-21 LAB
ALBUMIN SERPL BCP-MCNC: 4.7 G/DL (ref 3.2–4.9)
ALBUMIN/GLOB SERPL: 1.9 G/DL
ALP SERPL-CCNC: 94 U/L (ref 30–99)
ALT SERPL-CCNC: 10 U/L (ref 2–50)
ANION GAP SERPL CALC-SCNC: 10 MMOL/L (ref 0–11.9)
AST SERPL-CCNC: 12 U/L (ref 12–45)
BASOPHILS # BLD AUTO: 0.4 % (ref 0–1.8)
BASOPHILS # BLD: 0.05 K/UL (ref 0–0.12)
BILIRUB SERPL-MCNC: 0.2 MG/DL (ref 0.1–1.5)
BUN SERPL-MCNC: 9 MG/DL (ref 8–22)
CALCIUM SERPL-MCNC: 9.6 MG/DL (ref 8.5–10.5)
CHLORIDE SERPL-SCNC: 108 MMOL/L (ref 96–112)
CO2 SERPL-SCNC: 22 MMOL/L (ref 20–33)
CREAT SERPL-MCNC: 0.54 MG/DL (ref 0.5–1.4)
EOSINOPHIL # BLD AUTO: 0.23 K/UL (ref 0–0.51)
EOSINOPHIL NFR BLD: 1.8 % (ref 0–6.9)
ERYTHROCYTE [DISTWIDTH] IN BLOOD BY AUTOMATED COUNT: 42.8 FL (ref 35.9–50)
GFR SERPL CREATININE-BSD FRML MDRD: >60 ML/MIN/1.73 M 2
GLOBULIN SER CALC-MCNC: 2.5 G/DL (ref 1.9–3.5)
GLUCOSE SERPL-MCNC: 102 MG/DL (ref 65–99)
HCT VFR BLD AUTO: 39.5 % (ref 37–47)
HGB BLD-MCNC: 13.3 G/DL (ref 12–16)
IMM GRANULOCYTES # BLD AUTO: 0.04 K/UL (ref 0–0.11)
IMM GRANULOCYTES NFR BLD AUTO: 0.3 % (ref 0–0.9)
LYMPHOCYTES # BLD AUTO: 4.36 K/UL (ref 1–4.8)
LYMPHOCYTES NFR BLD: 33.9 % (ref 22–41)
MCH RBC QN AUTO: 30.7 PG (ref 27–33)
MCHC RBC AUTO-ENTMCNC: 33.7 G/DL (ref 33.6–35)
MCV RBC AUTO: 91.2 FL (ref 81.4–97.8)
MONOCYTES # BLD AUTO: 0.42 K/UL (ref 0–0.85)
MONOCYTES NFR BLD AUTO: 3.3 % (ref 0–13.4)
NEUTROPHILS # BLD AUTO: 7.78 K/UL (ref 2–7.15)
NEUTROPHILS NFR BLD: 60.3 % (ref 44–72)
NRBC # BLD AUTO: 0 K/UL
NRBC BLD AUTO-RTO: 0 /100 WBC
PLATELET # BLD AUTO: 240 K/UL (ref 164–446)
PMV BLD AUTO: 10.3 FL (ref 9–12.9)
POTASSIUM SERPL-SCNC: 3.4 MMOL/L (ref 3.6–5.5)
PROT SERPL-MCNC: 7.2 G/DL (ref 6–8.2)
RBC # BLD AUTO: 4.33 M/UL (ref 4.2–5.4)
SODIUM SERPL-SCNC: 140 MMOL/L (ref 135–145)
WBC # BLD AUTO: 12.9 K/UL (ref 4.8–10.8)

## 2017-10-21 PROCEDURE — 85025 COMPLETE CBC W/AUTO DIFF WBC: CPT

## 2017-10-21 PROCEDURE — 36415 COLL VENOUS BLD VENIPUNCTURE: CPT

## 2017-10-21 PROCEDURE — 84703 CHORIONIC GONADOTROPIN ASSAY: CPT

## 2017-10-21 PROCEDURE — 99285 EMERGENCY DEPT VISIT HI MDM: CPT

## 2017-10-21 PROCEDURE — 84702 CHORIONIC GONADOTROPIN TEST: CPT

## 2017-10-21 PROCEDURE — 80053 COMPREHEN METABOLIC PANEL: CPT

## 2017-10-21 ASSESSMENT — PAIN SCALES - GENERAL: PAINLEVEL_OUTOF10: 9

## 2017-10-22 ENCOUNTER — HOSPITAL ENCOUNTER (EMERGENCY)
Facility: MEDICAL CENTER | Age: 22
End: 2017-10-22
Attending: EMERGENCY MEDICINE
Payer: MEDICAID

## 2017-10-22 ENCOUNTER — APPOINTMENT (OUTPATIENT)
Dept: RADIOLOGY | Facility: MEDICAL CENTER | Age: 22
End: 2017-10-22
Attending: EMERGENCY MEDICINE
Payer: MEDICAID

## 2017-10-22 VITALS
HEIGHT: 67 IN | RESPIRATION RATE: 22 BRPM | OXYGEN SATURATION: 97 % | HEART RATE: 74 BPM | TEMPERATURE: 98.4 F | SYSTOLIC BLOOD PRESSURE: 126 MMHG | WEIGHT: 161.16 LBS | DIASTOLIC BLOOD PRESSURE: 71 MMHG | BODY MASS INDEX: 25.29 KG/M2

## 2017-10-22 DIAGNOSIS — O03.9 MISCARRIAGE: ICD-10-CM

## 2017-10-22 DIAGNOSIS — N93.9 VAGINAL BLEEDING: ICD-10-CM

## 2017-10-22 LAB
APPEARANCE UR: CLEAR
B-HCG SERPL-ACNC: 1310 MIU/ML (ref 0–5)
BACTERIA #/AREA URNS HPF: ABNORMAL /HPF
BILIRUB UR QL STRIP.AUTO: NEGATIVE
COLOR UR: YELLOW
CULTURE IF INDICATED INDCX: YES UA CULTURE
EPI CELLS #/AREA URNS HPF: ABNORMAL /HPF
GLUCOSE UR STRIP.AUTO-MCNC: NEGATIVE MG/DL
HCG SERPL QL: POSITIVE
HYALINE CASTS #/AREA URNS LPF: ABNORMAL /LPF
KETONES UR STRIP.AUTO-MCNC: NEGATIVE MG/DL
LEUKOCYTE ESTERASE UR QL STRIP.AUTO: ABNORMAL
MICRO URNS: ABNORMAL
NITRITE UR QL STRIP.AUTO: NEGATIVE
PH UR STRIP.AUTO: 6.5 [PH]
PROT UR QL STRIP: NEGATIVE MG/DL
RBC # URNS HPF: ABNORMAL /HPF
RBC UR QL AUTO: ABNORMAL
SP GR UR STRIP.AUTO: 1.03
UROBILINOGEN UR STRIP.AUTO-MCNC: 1 MG/DL
WBC #/AREA URNS HPF: ABNORMAL /HPF

## 2017-10-22 PROCEDURE — 81001 URINALYSIS AUTO W/SCOPE: CPT

## 2017-10-22 PROCEDURE — 76817 TRANSVAGINAL US OBSTETRIC: CPT

## 2017-10-22 PROCEDURE — 87086 URINE CULTURE/COLONY COUNT: CPT

## 2017-10-22 PROCEDURE — 96360 HYDRATION IV INFUSION INIT: CPT

## 2017-10-22 PROCEDURE — 700105 HCHG RX REV CODE 258: Performed by: EMERGENCY MEDICINE

## 2017-10-22 RX ORDER — TRAMADOL HYDROCHLORIDE 50 MG/1
50 TABLET ORAL EVERY 8 HOURS PRN
Qty: 20 TAB | Refills: 0 | Status: SHIPPED | OUTPATIENT
Start: 2017-10-22 | End: 2018-11-27

## 2017-10-22 RX ORDER — SODIUM CHLORIDE 9 MG/ML
1000 INJECTION, SOLUTION INTRAVENOUS ONCE
Status: COMPLETED | OUTPATIENT
Start: 2017-10-22 | End: 2017-10-22

## 2017-10-22 RX ADMIN — SODIUM CHLORIDE 1000 ML: 9 INJECTION, SOLUTION INTRAVENOUS at 02:54

## 2017-10-22 NOTE — ED NOTES
Patient to ED triage with complaints of vaginal bleeding, dizziness and weakness since Monday. States she completed a chemical  on Monday. She was seen in ED for bleeding Wednesday and had US. She has continued to saturate 2 pad per hrs. She report chills and fatigue.     Pt educated on ED process and asked to wait in lobby. Patient educated on importance of alerting staff to new or worsening symptoms or concerns.

## 2017-10-22 NOTE — DISCHARGE INSTRUCTIONS
"Return if you have increasing pain, fever, severe vaginal bleeding greater than a pad an hour, chest pain or pass out.   Miscarriage  A miscarriage is the sudden loss of an unborn baby (fetus) before the 20th week of pregnancy. Most miscarriages happen in the first 3 months of pregnancy. Sometimes, it happens before a woman even knows she is pregnant. A miscarriage is also called a \"spontaneous miscarriage\" or \"early pregnancy loss.\" Having a miscarriage can be an emotional experience. Talk with your caregiver about any questions you may have about miscarrying, the grieving process, and your future pregnancy plans.  CAUSES   · Problems with the fetal chromosomes that make it impossible for the baby to develop normally. Problems with the baby's genes or chromosomes are most often the result of errors that occur, by chance, as the embryo divides and grows. The problems are not inherited from the parents.  · Infection of the cervix or uterus.    · Hormone problems.    · Problems with the cervix, such as having an incompetent cervix. This is when the tissue in the cervix is not strong enough to hold the pregnancy.    · Problems with the uterus, such as an abnormally shaped uterus, uterine fibroids, or congenital abnormalities.    · Certain medical conditions.    · Smoking, drinking alcohol, or taking illegal drugs.    · Trauma.    Often, the cause of a miscarriage is unknown.   SYMPTOMS   · Vaginal bleeding or spotting, with or without cramps or pain.  · Pain or cramping in the abdomen or lower back.  · Passing fluid, tissue, or blood clots from the vagina.  DIAGNOSIS   Your caregiver will perform a physical exam. You may also have an ultrasound to confirm the miscarriage. Blood or urine tests may also be ordered.  TREATMENT   · Sometimes, treatment is not necessary if you naturally pass all the fetal tissue that was in the uterus. If some of the fetus or placenta remains in the body (incomplete miscarriage), tissue " left behind may become infected and must be removed. Usually, a dilation and curettage (D and C) procedure is performed. During a D and C procedure, the cervix is widened (dilated) and any remaining fetal or placental tissue is gently removed from the uterus.  · Antibiotic medicines are prescribed if there is an infection. Other medicines may be given to reduce the size of the uterus (contract) if there is a lot of bleeding.  · If you have Rh negative blood and your baby was Rh positive, you will need a Rh immunoglobulin shot. This shot will protect any future baby from having Rh blood problems in future pregnancies.  HOME CARE INSTRUCTIONS   · Your caregiver may order bed rest or may allow you to continue light activity. Resume activity as directed by your caregiver.  · Have someone help with home and family responsibilities during this time.    · Keep track of the number of sanitary pads you use each day and how soaked (saturated) they are. Write down this information.    · Do not use tampons. Do not douche or have sexual intercourse until approved by your caregiver.    · Only take over-the-counter or prescription medicines for pain or discomfort as directed by your caregiver.    · Do not take aspirin. Aspirin can cause bleeding.    · Keep all follow-up appointments with your caregiver.    · If you or your partner have problems with grieving, talk to your caregiver or seek counseling to help cope with the pregnancy loss. Allow enough time to grieve before trying to get pregnant again.    SEEK IMMEDIATE MEDICAL CARE IF:   · You have severe cramps or pain in your back or abdomen.  · You have a fever.  · You pass large blood clots (walnut-sized or larger) or tissue from your vagina. Save any tissue for your caregiver to inspect.    · Your bleeding increases.    · You have a thick, bad-smelling vaginal discharge.  · You become lightheaded, weak, or you faint.    · You have chills.    MAKE SURE YOU:  · Understand these  instructions.  · Will watch your condition.  · Will get help right away if you are not doing well or get worse.     This information is not intended to replace advice given to you by your health care provider. Make sure you discuss any questions you have with your health care provider.     Document Released: 2002 Document Revised: 2014 Document Reviewed: 2013  [employer] Patient Education © [employer]    .  Pelvic Rest  Pelvic rest is sometimes recommended for women when:   · The placenta is partially or completely covering the opening of the cervix (placenta previa).  · There is bleeding between the uterine wall and the amniotic sac in the first trimester (subchorionic hemorrhage).  · The cervix begins to open without labor starting (incompetent cervix, cervical insufficiency).  · The labor is too early ( labor).  HOME CARE INSTRUCTIONS  · Do not have sexual intercourse, stimulation, or an orgasm.  · Do not use tampons, douche, or put anything in the vagina.  · Do not lift anything over 10 pounds (4.5 kg).  · Avoid strenuous activity or straining your pelvic muscles.  SEEK MEDICAL CARE IF:   · You have any vaginal bleeding during pregnancy. Treat this as a potential emergency.  · You have cramping pain felt low in the stomach (stronger than menstrual cramps).  · You notice vaginal discharge (watery, mucus, or bloody).  · You have a low, dull backache.  · There are regular contractions or uterine tightening.  SEEK IMMEDIATE MEDICAL CARE IF:  You have vaginal bleeding and have placenta previa.      This information is not intended to replace advice given to you by your health care provider. Make sure you discuss any questions you have with your health care provider.     Document Released: 2012 Document Revised: 2013 Document Reviewed: 2012  [employer] Patient Education © [employer]

## 2017-10-22 NOTE — ED NOTES
IV attempted without success. US at bedside, second IV insertion will be attempted after US complete.

## 2017-10-22 NOTE — ED PROVIDER NOTES
ED Provider Note    Scribed for Jorge Luis Gordon M.D. by Edil Jones. 10/22/2017, 1:21 AM.    Primary care provider: Naveed Ku M.D.  Means of arrival: Walk In  History obtained from: Patient  History limited by: None     CHIEF COMPLAINT  Chief Complaint   Patient presents with   • Vaginal Bleeding     HPI  Karlie Santos is a 22 y.o. female who presents to the Emergency Department for vaginal bleeding. The patient had an initial onset of light vaginal bleeding two weeks ago. She tried to schedule appointment with [facility] to be evaluated for this bleeding, but was unable to see them and presented to [facility] ED instead on 10/17/17. She was seen at [facility] by Dr. Ramsay, where she had an ultrasound that showed miscarriage. The patient was discharged and informed of miscarriage precautions. She reports her vaginal bleeding has been light since her miscarriage, although became much heavier throughout the day today. She reports soaking through 2 pads per hour during the day today. She denies experiencing any abdominal pain with her heavier vaginal bleeding.The patient has an obstetric history of . She has never seen a gynecologist.  The patient denies any lightheadedness, dizziness, vomiting, diarrhea, or dysuria.     REVIEW OF SYSTEMS  Pertinent positives include vaginal bleeding. Pertinent negatives include abdominal pain, lightheadedness, dizziness, vomiting, diarrhea, or dysuria. All other systems negative.    PAST MEDICAL HISTORY  Obstetric history of      SURGICAL HISTORY   has a past surgical history that includes other orthopedic surgery and primary c section (Bilateral, 2016).    SOCIAL HISTORY  Social History   Substance Use Topics   • Smoking status: Current Every Day Smoker     Packs/day: 0.50     Types: Cigarettes     Start date: 2008   • Smokeless tobacco: Never Used   • Alcohol use Yes      Comment: Very occationally      History   Drug Use   • Types:  "Inhaled     Comment: Marijuana - Last used when found out she was pregnant     FAMILY HISTORY  History reviewed. No pertinent family history.    CURRENT MEDICATIONS  Home Medications     Reviewed by Jyothi Tran R.N. (Registered Nurse) on 10/22/17 at 0108  Med List Status: Not Addressed   Medication Last Dose Status   hydrocodone-acetaminophen (NORCO) 5-325 MG Tab per tablet 10/21/2017 Active   misoprostol (CYTOTEC) 100 MCG Tab 10/16/2017 Active              ALLERGIES  No Known Allergies    PHYSICAL EXAM  VITAL SIGNS: /71   Pulse 86   Temp 36.9 °C (98.4 °F) (Temporal)   Resp 16   Ht 1.702 m (5' 7\")   Wt 73.1 kg (161 lb 2.5 oz)   LMP 07/24/2017   SpO2 97%   BMI 25.24 kg/m²     Constitutional: Well developed, Well nourished, no distress.   HENT: Normocephalic, Atraumatic, Oropharynx moist.   Eyes: Conjunctiva normal, No discharge.   Neck: Supple, No stridor   Cardiovascular: Normal heart rate, Normal rhythm, No murmurs, equal pulses.   Pulmonary: Normal breath sounds, No respiratory distress, No wheezing, No rales, No rhonchi.  Chest: No chest wall tenderness or deformity.   Abdomen:Soft, Mild suprapubic tenderness, No masses, no rebound, no guarding.   normal external genitalia, small amount of dark blood in vaginal vault, there is a 1 cm open cervix with a clot in the cervical opening. This was removed with ring forceps. Patient did not have any significant adnexal tenderness slight suprapubic tenderness   Back: Slight right CVA tenderness.   Musculoskeletal: No major deformities noted, No tenderness.   Skin: Slightly pale. Warm, Dry, No erythema, No rash.   Neurologic: Alert & oriented x 3, Normal motor function,  No focal deficits noted.   Psychiatric: Affect normal, Judgment normal, Mood normal.     LABS  Results for orders placed or performed during the hospital encounter of 10/22/17   CBC WITH DIFFERENTIAL   Result Value Ref Range    WBC 12.9 (H) 4.8 - 10.8 K/uL    RBC 4.33 4.20 - 5.40 " M/uL    Hemoglobin 13.3 12.0 - 16.0 g/dL    Hematocrit 39.5 37.0 - 47.0 %    MCV 91.2 81.4 - 97.8 fL    MCH 30.7 27.0 - 33.0 pg    MCHC 33.7 33.6 - 35.0 g/dL    RDW 42.8 35.9 - 50.0 fL    Platelet Count 240 164 - 446 K/uL    MPV 10.3 9.0 - 12.9 fL    Neutrophils-Polys 60.30 44.00 - 72.00 %    Lymphocytes 33.90 22.00 - 41.00 %    Monocytes 3.30 0.00 - 13.40 %    Eosinophils 1.80 0.00 - 6.90 %    Basophils 0.40 0.00 - 1.80 %    Immature Granulocytes 0.30 0.00 - 0.90 %    Nucleated RBC 0.00 /100 WBC    Neutrophils (Absolute) 7.78 (H) 2.00 - 7.15 K/uL    Lymphs (Absolute) 4.36 1.00 - 4.80 K/uL    Monos (Absolute) 0.42 0.00 - 0.85 K/uL    Eos (Absolute) 0.23 0.00 - 0.51 K/uL    Baso (Absolute) 0.05 0.00 - 0.12 K/uL    Immature Granulocytes (abs) 0.04 0.00 - 0.11 K/uL    NRBC (Absolute) 0.00 K/uL   COMP METABOLIC PANEL   Result Value Ref Range    Sodium 140 135 - 145 mmol/L    Potassium 3.4 (L) 3.6 - 5.5 mmol/L    Chloride 108 96 - 112 mmol/L    Co2 22 20 - 33 mmol/L    Anion Gap 10.0 0.0 - 11.9    Glucose 102 (H) 65 - 99 mg/dL    Bun 9 8 - 22 mg/dL    Creatinine 0.54 0.50 - 1.40 mg/dL    Calcium 9.6 8.5 - 10.5 mg/dL    AST(SGOT) 12 12 - 45 U/L    ALT(SGPT) 10 2 - 50 U/L    Alkaline Phosphatase 94 30 - 99 U/L    Total Bilirubin 0.2 0.1 - 1.5 mg/dL    Albumin 4.7 3.2 - 4.9 g/dL    Total Protein 7.2 6.0 - 8.2 g/dL    Globulin 2.5 1.9 - 3.5 g/dL    A-G Ratio 1.9 g/dL   ESTIMATED GFR   Result Value Ref Range    GFR If African American >60 >60 mL/min/1.73 m 2    GFR If Non African American >60 >60 mL/min/1.73 m 2   BETA-HCG QUALITATIVE SERUM   Result Value Ref Range    Beta-Hcg Qualitative Serum Positive (A) Negative   HCG QUANTITATIVE   Result Value Ref Range    Bhcg 1310.0 (H) 0.0 - 5.0 mIU/mL   URINALYSIS CULTURE, IF INDICATED   Result Value Ref Range    Color Yellow     Character Clear     Specific Gravity 1.030 <1.035    Ph 6.5 5.0 - 8.0    Glucose Negative Negative mg/dL    Ketones Negative Negative mg/dL    Protein  Negative Negative mg/dL    Bilirubin Negative Negative    Urobilinogen, Urine 1.0 Negative    Nitrite Negative Negative    Leukocyte Esterase Trace (A) Negative    Occult Blood Large (A) Negative    Micro Urine Req Microscopic     Culture Indicated Yes UA Culture   URINE MICROSCOPIC (W/UA)   Result Value Ref Range    WBC 2-5 /hpf    RBC 2-5 (A) /hpf    Bacteria Few (A) None /hpf    Epithelial Cells Few /hpf    Hyaline Cast 0-2 /lpf      All labs reviewed by me.      RADIOLOGY  US-OB PELVIS TRANSVAGINAL   Final Result      1.  No evidence of intrauterine pregnancy.   2.  Heterogeneous, mostly hyperechoic material in the endocervical canal, suggesting hemorrhagic debris and/or products of conception.   3.  Corpus luteum cyst in the left ovary.   4.  Minimal cul-de-sac fluid.        The radiologist's interpretation of all radiological studies have been reviewed by me.    COURSE & MEDICAL DECISION MAKING  Pertinent Labs & Imaging studies reviewed. (See chart for details)    1:21 AM - Patient seen and examined at bedside. Patient was resuscitated with IV fluids for dehydration. Ordered US OB pelvis, urine microscopic, urinalysis, BetaHCG, urine culture, CBC, CMP to evaluate her symptoms. The differential diagnoses include but are not limited to:Retained products of conception, massive hemorrhage, anemia, UTI    Reviewed prior medical records: The patient was seen in this ED on 10/171/17 for vaginal bleeding. She was treated with misoprostol for a miscarriage and it was thought she had passed up but she presents for vaginal bleeding.     4:21 AM Paged gynecology.     4:56 AM Spoke with Dr. James, Gynecology, about the patient's condition. Dr. James believes patient stable for discharge as she is having symptoms consistent with miscarriage. The patient was provided with discharge instructions which she understands.     Medical Decision Making:At this point time I think the patient is still having bleeding from her  miscarriage. I have discussed this can last for several weeks. She is not having any severe hemorrhage at this point time therefore think she can be discharged home. She was given pelvic rest precautions.    The patient is referred to a primary physician for blood pressure management, diabetic screening, and for all other preventative health concerns.    DISPOSITION:  Patient will be discharged home in stable condition.    FOLLOW UP:  Naveed Ku M.D.  [address]   [address]  [facility] [id number]  [phone]    Schedule an appointment as soon as possible for a visit in 2 days      FINAL IMPRESSION  1. Miscarriage    2. Vaginal bleeding          Edil ALANIZ (Marckibkranthi), am scribing for, and in the presence of, Jorge Luis Gordon M.D.    Electronically signed by: Edil Jones (Elmo), 10/22/2017    Jorge Luis ALANIZ M.D. personally performed the services described in this documentation, as scribed by Edil Jones in my presence, and it is both accurate and complete.    The note accurately reflects work and decisions made by me.  Jorge Luis Gordon  10/22/2017  7:10 AM

## 2017-10-22 NOTE — ED NOTES
Pt verbalized understanding of discharge and follow up instructions. PIV removed. Pt given Rx.  VSS.  All questions answered, ambulates with steady gait to discharge.

## 2017-10-24 LAB
BACTERIA UR CULT: NORMAL
SIGNIFICANT IND 70042: NORMAL
SITE SITE: NORMAL
SOURCE SOURCE: NORMAL

## 2018-05-15 ENCOUNTER — OFFICE VISIT (OUTPATIENT)
Dept: URGENT CARE | Facility: PHYSICIAN GROUP | Age: 23
End: 2018-05-15
Payer: MEDICAID

## 2018-05-15 VITALS
TEMPERATURE: 97.5 F | WEIGHT: 160 LBS | HEART RATE: 80 BPM | OXYGEN SATURATION: 96 % | HEIGHT: 67 IN | RESPIRATION RATE: 16 BRPM | DIASTOLIC BLOOD PRESSURE: 76 MMHG | SYSTOLIC BLOOD PRESSURE: 114 MMHG | BODY MASS INDEX: 25.11 KG/M2

## 2018-05-15 DIAGNOSIS — S16.1XXA CERVICAL MYOFASCIAL STRAIN, INITIAL ENCOUNTER: ICD-10-CM

## 2018-05-15 DIAGNOSIS — R42 DIZZY: ICD-10-CM

## 2018-05-15 DIAGNOSIS — R51.9 FREQUENT HEADACHES: ICD-10-CM

## 2018-05-15 DIAGNOSIS — S29.019A THORACIC MYOFASCIAL STRAIN, INITIAL ENCOUNTER: ICD-10-CM

## 2018-05-15 LAB
APPEARANCE UR: NORMAL
BILIRUB UR STRIP-MCNC: NORMAL MG/DL
COLOR UR AUTO: NORMAL
GLUCOSE BLD-MCNC: 125 MG/DL (ref 70–100)
GLUCOSE UR STRIP.AUTO-MCNC: NORMAL MG/DL
INT CON NEG: NEGATIVE
INT CON POS: POSITIVE
KETONES UR STRIP.AUTO-MCNC: NORMAL MG/DL
LEUKOCYTE ESTERASE UR QL STRIP.AUTO: NORMAL
NITRITE UR QL STRIP.AUTO: NORMAL
PH UR STRIP.AUTO: 6.5 [PH] (ref 5–8)
POC URINE PREGNANCY TEST: NEGATIVE
PROT UR QL STRIP: NORMAL MG/DL
RBC UR QL AUTO: NORMAL
SP GR UR STRIP.AUTO: 1.01
UROBILINOGEN UR STRIP-MCNC: NORMAL MG/DL

## 2018-05-15 PROCEDURE — 81025 URINE PREGNANCY TEST: CPT | Performed by: PHYSICIAN ASSISTANT

## 2018-05-15 PROCEDURE — 81002 URINALYSIS NONAUTO W/O SCOPE: CPT | Performed by: PHYSICIAN ASSISTANT

## 2018-05-15 PROCEDURE — 82962 GLUCOSE BLOOD TEST: CPT | Performed by: PHYSICIAN ASSISTANT

## 2018-05-15 PROCEDURE — 99204 OFFICE O/P NEW MOD 45 MIN: CPT | Mod: 25 | Performed by: PHYSICIAN ASSISTANT

## 2018-05-15 RX ORDER — IBUPROFEN 800 MG/1
800 TABLET ORAL EVERY 8 HOURS PRN
Qty: 30 TAB | Refills: 0 | Status: SHIPPED | OUTPATIENT
Start: 2018-05-15 | End: 2018-11-27

## 2018-05-15 NOTE — PROGRESS NOTES
"Chief Complaint   Patient presents with   • Headache       HISTORY OF PRESENT ILLNESS: Patient is a 23 y.o. 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  Denies previous injuries  Denies distal paresthesia, extremity weakness, saddle anesthesia  LMP: 18  Birth control: none  Denies dysuria, seasonal allergy symptoms  Last BM: this morning  PCP: none  Last eye exam: ?never  Tob: 0.5 ppd give or take  Marijuana: \"a bowl a day\"  EtOH: rarely  H/o illicits: \"I've tried a few things but never use them a lot\"     Patient Active Problem List    Diagnosis Date Noted   • Lower urinary tract infectious disease 2016   • Anemia affecting pregnancy 2016   • Omphalocele, fetal, affecting care of mother, antepartum 2016   • Pregnancy 2016   • Homelessness 2016   •  labor 2016       Allergies:Patient has no known allergies.    Current Outpatient Prescriptions Ordered in Psychiatric   Medication Sig Dispense Refill   • ibuprofen (MOTRIN) 800 MG Tab Take 1 Tab by mouth every 8 hours as needed for Mild Pain or Moderate Pain. 30 Tab 0   • tramadol (ULTRAM) 50 MG Tab Take 1 Tab by mouth every 8 hours as needed. 20 Tab 0   • misoprostol (CYTOTEC) 100 MCG Tab Take 100 mcg by mouth 4 times a day. Unsure of dose, took 4 intravag  yesterday     • hydrocodone-acetaminophen (NORCO) 5-325 MG Tab per tablet Take 1-2 Tabs by mouth every four hours as needed.       No current Epic-ordered facility-administered medications on file.        No past medical history on " "file.    Social History   Substance Use Topics   • Smoking status: Current Every Day Smoker     Packs/day: 0.50     Types: Cigarettes     Start date: 1/1/2008   • Smokeless tobacco: Never Used   • Alcohol use Yes      Comment: Very occationally       No family status information on file.   No family history on file.    Review of Systems:    Constitutional ROS: No unexpected change in weight, No weakness, No fatigue  Eye ROS:  No eye pain, redness, discharge  Ear ROS: No drainage, No tinnitus or vertigo, No recent change in hearing  Mouth/Throat ROS: No teeth or gum problems, No bleeding gums, No tongue complaints  Neck ROS: No swollen glands   Pulmonary ROS: No chronic cough, sputum, or hemoptysis, No dyspnea on exertion, No wheezing  Cardiovascular ROS: No diaphoresis, No edema, No palpitations  Gastrointestinal ROS: No change in bowel habits, No significant change in appetite, No nausea, vomiting, diarrhea, or constipation  Musculoskeletal/Extremities ROS: No peripheral edema, No pain, redness or swelling on the joints  Hematologic/Lymphatic ROS: No chills, No night sweats, No weight loss  Skin/Integumentary ROS: No edema, No evidence of rash, No itching      Exam:  Blood pressure 114/76, pulse 80, temperature 36.4 °C (97.5 °F), resp. rate 16, height 1.689 m (5' 6.5\"), weight 72.6 kg (160 lb), SpO2 96 %.  General: Well developed, well nourished. No distress.  Eye: PERRL/EOMI; conjunctivae clear, lids normal.  ENMT: Lips without lesions, good dentition. Oropharynx is clear. Bilateral TMs are within normal limits.  Neck: Trachea midline, no masses. No thyromegaly. For range of motion with no localized tenderness noted.  Pulmonary: Unlabored respiratory effort. Lungs clear to auscultation, no wheezes, no rhonchi.  Cardiovascular: Regular rate and rhythm without murmur. No edema.   Back: No localized tenderness noted. Full range of motion.  Abdomen: Soft, non-tender, nondistended. No hepatosplenomegaly. Bowel sounds " within normal limits.   Extremities: Full range of motion all extremities. No motor or sensory deficit noted.  strength is strong and equal bilaterally.  Neurologic: Grossly nonfocal. No facial asymmetry noted. No tongue deviation noted. Negative pronator drift.  Lymph: No cervical lymphadenopathy noted.   Skin: Warm, dry, good turgor. No rashes in visible areas.   Psych: Normal mood. Alert and oriented x3. Judgment and insight is normal.    UA: Negative    Urine hCG: Negative    Glucose: 125    Assessment/Plan:  Discussed differential diagnosis with the patient including but not limited to viral etiology, seasonal allergies, migraine, cluster headaches, muscle strain, among others. Recommend continuing ibuprofen as needed for pain as this seems to be working. Discussed keeping a headache journal. Establish and follow up with a primary care provider for further evaluation and management. Discussed ER precautions. Will contact patient with lab results.  1. Frequent headaches  ibuprofen (MOTRIN) 800 MG Tab    CBC WITH DIFFERENTIAL    COMP METABOLIC PANEL    FREE THYROXINE    TSH    VITAMIN B12    VITAMIN D,25 HYDROXY   2. Dizzy  POCT Urinalysis    POCT PREGNANCY    POCT glucose   3. Cervical myofascial strain, initial encounter     4. Thoracic myofascial strain, initial encounter

## 2018-11-27 ENCOUNTER — HOSPITAL ENCOUNTER (OUTPATIENT)
Facility: MEDICAL CENTER | Age: 23
End: 2018-11-29
Attending: EMERGENCY MEDICINE | Admitting: FAMILY MEDICINE

## 2018-11-27 DIAGNOSIS — T50.902A INTENTIONAL DRUG OVERDOSE, INITIAL ENCOUNTER (HCC): ICD-10-CM

## 2018-11-27 DIAGNOSIS — F33.2 SEVERE EPISODE OF RECURRENT MAJOR DEPRESSIVE DISORDER, WITHOUT PSYCHOTIC FEATURES (HCC): ICD-10-CM

## 2018-11-27 LAB
ALBUMIN SERPL BCP-MCNC: 4.8 G/DL (ref 3.2–4.9)
ALBUMIN/GLOB SERPL: 1.9 G/DL
ALP SERPL-CCNC: 83 U/L (ref 30–99)
ALT SERPL-CCNC: 13 U/L (ref 2–50)
AMPHET UR QL SCN: NEGATIVE
ANION GAP SERPL CALC-SCNC: 9 MMOL/L (ref 0–11.9)
APAP SERPL-MCNC: <10 UG/ML (ref 10–30)
APPEARANCE UR: CLEAR
AST SERPL-CCNC: 11 U/L (ref 12–45)
BARBITURATES UR QL SCN: NEGATIVE
BASOPHILS # BLD AUTO: 0.5 % (ref 0–1.8)
BASOPHILS # BLD: 0.07 K/UL (ref 0–0.12)
BENZODIAZ UR QL SCN: POSITIVE
BILIRUB SERPL-MCNC: 0.4 MG/DL (ref 0.1–1.5)
BILIRUB UR QL STRIP.AUTO: NEGATIVE
BUN SERPL-MCNC: 13 MG/DL (ref 8–22)
BZE UR QL SCN: NEGATIVE
CALCIUM SERPL-MCNC: 9.9 MG/DL (ref 8.5–10.5)
CANNABINOIDS UR QL SCN: NEGATIVE
CHLORIDE SERPL-SCNC: 108 MMOL/L (ref 96–112)
CO2 SERPL-SCNC: 22 MMOL/L (ref 20–33)
COLOR UR: YELLOW
CREAT SERPL-MCNC: 0.74 MG/DL (ref 0.5–1.4)
EKG IMPRESSION: NORMAL
EOSINOPHIL # BLD AUTO: 0.02 K/UL (ref 0–0.51)
EOSINOPHIL NFR BLD: 0.1 % (ref 0–6.9)
ERYTHROCYTE [DISTWIDTH] IN BLOOD BY AUTOMATED COUNT: 41.8 FL (ref 35.9–50)
ERYTHROCYTE [DISTWIDTH] IN BLOOD BY AUTOMATED COUNT: 42.6 FL (ref 35.9–50)
ETHANOL BLD-MCNC: 0 G/DL
GLOBULIN SER CALC-MCNC: 2.5 G/DL (ref 1.9–3.5)
GLUCOSE SERPL-MCNC: 108 MG/DL (ref 65–99)
GLUCOSE UR STRIP.AUTO-MCNC: NEGATIVE MG/DL
HCG SERPL QL: NEGATIVE
HCT VFR BLD AUTO: 38.8 % (ref 37–47)
HCT VFR BLD AUTO: 40.5 % (ref 37–47)
HGB BLD-MCNC: 13.3 G/DL (ref 12–16)
HGB BLD-MCNC: 13.9 G/DL (ref 12–16)
IMM GRANULOCYTES # BLD AUTO: 0.06 K/UL (ref 0–0.11)
IMM GRANULOCYTES NFR BLD AUTO: 0.4 % (ref 0–0.9)
KETONES UR STRIP.AUTO-MCNC: NEGATIVE MG/DL
LEUKOCYTE ESTERASE UR QL STRIP.AUTO: NEGATIVE
LIPASE SERPL-CCNC: 8 U/L (ref 11–82)
LYMPHOCYTES # BLD AUTO: 1.6 K/UL (ref 1–4.8)
LYMPHOCYTES NFR BLD: 11.6 % (ref 22–41)
MAGNESIUM SERPL-MCNC: 1.8 MG/DL (ref 1.5–2.5)
MCH RBC QN AUTO: 31.8 PG (ref 27–33)
MCH RBC QN AUTO: 32.2 PG (ref 27–33)
MCHC RBC AUTO-ENTMCNC: 34.3 G/DL (ref 33.6–35)
MCHC RBC AUTO-ENTMCNC: 34.3 G/DL (ref 33.6–35)
MCV RBC AUTO: 92.7 FL (ref 81.4–97.8)
MCV RBC AUTO: 93.9 FL (ref 81.4–97.8)
METHADONE UR QL SCN: NEGATIVE
MICRO URNS: NORMAL
MONOCYTES # BLD AUTO: 0.59 K/UL (ref 0–0.85)
MONOCYTES NFR BLD AUTO: 4.3 % (ref 0–13.4)
NEUTROPHILS # BLD AUTO: 11.49 K/UL (ref 2–7.15)
NEUTROPHILS NFR BLD: 83.1 % (ref 44–72)
NITRITE UR QL STRIP.AUTO: NEGATIVE
NRBC # BLD AUTO: 0 K/UL
NRBC BLD-RTO: 0 /100 WBC
OPIATES UR QL SCN: NEGATIVE
OXYCODONE UR QL SCN: NEGATIVE
PCP UR QL SCN: NEGATIVE
PH UR STRIP.AUTO: 6 [PH]
PHOSPHATE SERPL-MCNC: 1 MG/DL (ref 2.5–4.5)
PLATELET # BLD AUTO: 194 K/UL (ref 164–446)
PLATELET # BLD AUTO: 226 K/UL (ref 164–446)
PMV BLD AUTO: 10.2 FL (ref 9–12.9)
PMV BLD AUTO: 10.7 FL (ref 9–12.9)
POC BREATHALIZER: 0 PERCENT (ref 0–0.01)
POTASSIUM SERPL-SCNC: 3.5 MMOL/L (ref 3.6–5.5)
PROPOXYPH UR QL SCN: NEGATIVE
PROT SERPL-MCNC: 7.3 G/DL (ref 6–8.2)
PROT UR QL STRIP: NEGATIVE MG/DL
RBC # BLD AUTO: 4.13 M/UL (ref 4.2–5.4)
RBC # BLD AUTO: 4.37 M/UL (ref 4.2–5.4)
RBC UR QL AUTO: NEGATIVE
SALICYLATES SERPL-MCNC: 0 MG/DL (ref 15–25)
SODIUM SERPL-SCNC: 139 MMOL/L (ref 135–145)
SP GR UR STRIP.AUTO: 1.01
UROBILINOGEN UR STRIP.AUTO-MCNC: 0.2 MG/DL
WBC # BLD AUTO: 11.2 K/UL (ref 4.8–10.8)
WBC # BLD AUTO: 13.8 K/UL (ref 4.8–10.8)

## 2018-11-27 PROCEDURE — 96374 THER/PROPH/DIAG INJ IV PUSH: CPT

## 2018-11-27 PROCEDURE — 96372 THER/PROPH/DIAG INJ SC/IM: CPT

## 2018-11-27 PROCEDURE — 80053 COMPREHEN METABOLIC PANEL: CPT

## 2018-11-27 PROCEDURE — 83735 ASSAY OF MAGNESIUM: CPT

## 2018-11-27 PROCEDURE — 81003 URINALYSIS AUTO W/O SCOPE: CPT

## 2018-11-27 PROCEDURE — 700111 HCHG RX REV CODE 636 W/ 250 OVERRIDE (IP): Performed by: EMERGENCY MEDICINE

## 2018-11-27 PROCEDURE — 85025 COMPLETE CBC W/AUTO DIFF WBC: CPT

## 2018-11-27 PROCEDURE — G0378 HOSPITAL OBSERVATION PER HR: HCPCS

## 2018-11-27 PROCEDURE — 36415 COLL VENOUS BLD VENIPUNCTURE: CPT

## 2018-11-27 PROCEDURE — 700111 HCHG RX REV CODE 636 W/ 250 OVERRIDE (IP): Performed by: FAMILY MEDICINE

## 2018-11-27 PROCEDURE — 85027 COMPLETE CBC AUTOMATED: CPT

## 2018-11-27 PROCEDURE — 80307 DRUG TEST PRSMV CHEM ANLYZR: CPT

## 2018-11-27 PROCEDURE — 700101 HCHG RX REV CODE 250: Performed by: FAMILY MEDICINE

## 2018-11-27 PROCEDURE — 83690 ASSAY OF LIPASE: CPT

## 2018-11-27 PROCEDURE — 302970 POC BREATHALIZER: Performed by: EMERGENCY MEDICINE

## 2018-11-27 PROCEDURE — 700105 HCHG RX REV CODE 258: Performed by: EMERGENCY MEDICINE

## 2018-11-27 PROCEDURE — 93005 ELECTROCARDIOGRAM TRACING: CPT | Performed by: EMERGENCY MEDICINE

## 2018-11-27 PROCEDURE — 99285 EMERGENCY DEPT VISIT HI MDM: CPT

## 2018-11-27 PROCEDURE — 84703 CHORIONIC GONADOTROPIN ASSAY: CPT

## 2018-11-27 PROCEDURE — 84100 ASSAY OF PHOSPHORUS: CPT

## 2018-11-27 RX ORDER — SODIUM CHLORIDE AND POTASSIUM CHLORIDE 150; 900 MG/100ML; MG/100ML
INJECTION, SOLUTION INTRAVENOUS CONTINUOUS
Status: DISCONTINUED | OUTPATIENT
Start: 2018-11-27 | End: 2018-11-28

## 2018-11-27 RX ORDER — ONDANSETRON 2 MG/ML
4 INJECTION INTRAMUSCULAR; INTRAVENOUS ONCE
Status: COMPLETED | OUTPATIENT
Start: 2018-11-27 | End: 2018-11-27

## 2018-11-27 RX ORDER — SODIUM CHLORIDE 9 MG/ML
1000 INJECTION, SOLUTION INTRAVENOUS ONCE
Status: COMPLETED | OUTPATIENT
Start: 2018-11-27 | End: 2018-11-27

## 2018-11-27 RX ORDER — LABETALOL HYDROCHLORIDE 5 MG/ML
10 INJECTION, SOLUTION INTRAVENOUS EVERY 4 HOURS PRN
Status: DISCONTINUED | OUTPATIENT
Start: 2018-11-27 | End: 2018-11-29 | Stop reason: HOSPADM

## 2018-11-27 RX ORDER — FLUOXETINE HYDROCHLORIDE 40 MG/1
40 CAPSULE ORAL DAILY
Status: ON HOLD | COMMUNITY
End: 2018-11-29

## 2018-11-27 RX ORDER — MAGNESIUM SULFATE HEPTAHYDRATE 40 MG/ML
2 INJECTION, SOLUTION INTRAVENOUS ONCE
Status: COMPLETED | OUTPATIENT
Start: 2018-11-27 | End: 2018-11-27

## 2018-11-27 RX ORDER — NICOTINE 21 MG/24HR
14 PATCH, TRANSDERMAL 24 HOURS TRANSDERMAL
Status: DISCONTINUED | OUTPATIENT
Start: 2018-11-28 | End: 2018-11-29 | Stop reason: HOSPADM

## 2018-11-27 RX ORDER — ACETAMINOPHEN 325 MG/1
650 TABLET ORAL EVERY 6 HOURS PRN
Status: DISCONTINUED | OUTPATIENT
Start: 2018-11-27 | End: 2018-11-29 | Stop reason: HOSPADM

## 2018-11-27 RX ORDER — HEPARIN SODIUM 5000 [USP'U]/ML
5000 INJECTION, SOLUTION INTRAVENOUS; SUBCUTANEOUS EVERY 8 HOURS
Status: DISCONTINUED | OUTPATIENT
Start: 2018-11-27 | End: 2018-11-29 | Stop reason: HOSPADM

## 2018-11-27 RX ORDER — LORAZEPAM 2 MG/ML
4 INJECTION INTRAMUSCULAR
Status: DISCONTINUED | OUTPATIENT
Start: 2018-11-27 | End: 2018-11-29 | Stop reason: HOSPADM

## 2018-11-27 RX ADMIN — POTASSIUM CHLORIDE AND SODIUM CHLORIDE: 900; 150 INJECTION, SOLUTION INTRAVENOUS at 19:52

## 2018-11-27 RX ADMIN — ONDANSETRON 4 MG: 2 INJECTION INTRAMUSCULAR; INTRAVENOUS at 19:50

## 2018-11-27 RX ADMIN — MAGNESIUM SULFATE IN WATER 2 G: 40 INJECTION, SOLUTION INTRAVENOUS at 21:21

## 2018-11-27 RX ADMIN — HEPARIN SODIUM 5000 UNITS: 5000 INJECTION, SOLUTION INTRAVENOUS; SUBCUTANEOUS at 21:22

## 2018-11-27 RX ADMIN — SODIUM CHLORIDE 1000 ML: 9 INJECTION, SOLUTION INTRAVENOUS at 18:30

## 2018-11-27 ASSESSMENT — LIFESTYLE VARIABLES
ON A TYPICAL DAY WHEN YOU DRINK ALCOHOL HOW MANY DRINKS DO YOU HAVE: 2
HAVE PEOPLE ANNOYED YOU BY CRITICIZING YOUR DRINKING: NO
AVERAGE NUMBER OF DAYS PER WEEK YOU HAVE A DRINK CONTAINING ALCOHOL: 2
ALCOHOL_USE: YES
DOES PATIENT WANT TO STOP DRINKING: NO
EVER FELT BAD OR GUILTY ABOUT YOUR DRINKING: YES
EVER HAD A DRINK FIRST THING IN THE MORNING TO STEADY YOUR NERVES TO GET RID OF A HANGOVER: NO
TOTAL SCORE: 2
TOTAL SCORE: 2
HAVE YOU EVER FELT YOU SHOULD CUT DOWN ON YOUR DRINKING: YES
HOW MANY TIMES IN THE PAST YEAR HAVE YOU HAD 5 OR MORE DRINKS IN A DAY: 0
TOTAL SCORE: 2
CONSUMPTION TOTAL: POSITIVE

## 2018-11-27 ASSESSMENT — COGNITIVE AND FUNCTIONAL STATUS - GENERAL
SUGGESTED CMS G CODE MODIFIER MOBILITY: CH
MOBILITY SCORE: 24
SUGGESTED CMS G CODE MODIFIER DAILY ACTIVITY: CH
DAILY ACTIVITIY SCORE: 24

## 2018-11-27 ASSESSMENT — PAIN SCALES - GENERAL
PAINLEVEL_OUTOF10: 5
PAINLEVEL_OUTOF10: 0

## 2018-11-27 ASSESSMENT — PATIENT HEALTH QUESTIONNAIRE - PHQ9
9. THOUGHTS THAT YOU WOULD BE BETTER OFF DEAD, OR OF HURTING YOURSELF: MORE THAN HALF THE DAYS
6. FEELING BAD ABOUT YOURSELF - OR THAT YOU ARE A FAILURE OR HAVE LET YOURSELF OR YOUR FAMILY DOWN: MORE THAN HALF THE DAYS
4. FEELING TIRED OR HAVING LITTLE ENERGY: NEARLY EVERY DAY
5. POOR APPETITE OR OVEREATING: NOT AT ALL
2. FEELING DOWN, DEPRESSED, IRRITABLE, OR HOPELESS: MORE THAN HALF THE DAYS
1. LITTLE INTEREST OR PLEASURE IN DOING THINGS: NEARLY EVERY DAY
3. TROUBLE FALLING OR STAYING ASLEEP OR SLEEPING TOO MUCH: NEARLY EVERY DAY
7. TROUBLE CONCENTRATING ON THINGS, SUCH AS READING THE NEWSPAPER OR WATCHING TELEVISION: MORE THAN HALF THE DAYS
SUM OF ALL RESPONSES TO PHQ QUESTIONS 1-9: 17
8. MOVING OR SPEAKING SO SLOWLY THAT OTHER PEOPLE COULD HAVE NOTICED. OR THE OPPOSITE, BEING SO FIGETY OR RESTLESS THAT YOU HAVE BEEN MOVING AROUND A LOT MORE THAN USUAL: NOT AT ALL
SUM OF ALL RESPONSES TO PHQ9 QUESTIONS 1 AND 2: 5

## 2018-11-28 LAB
ALBUMIN SERPL BCP-MCNC: 4.4 G/DL (ref 3.2–4.9)
ALBUMIN/GLOB SERPL: 2.3 G/DL
ALP SERPL-CCNC: 70 U/L (ref 30–99)
ALT SERPL-CCNC: 10 U/L (ref 2–50)
ANION GAP SERPL CALC-SCNC: 8 MMOL/L (ref 0–11.9)
AST SERPL-CCNC: 13 U/L (ref 12–45)
BILIRUB SERPL-MCNC: 0.6 MG/DL (ref 0.1–1.5)
BUN SERPL-MCNC: 7 MG/DL (ref 8–22)
CALCIUM SERPL-MCNC: 8.7 MG/DL (ref 8.5–10.5)
CHLORIDE SERPL-SCNC: 108 MMOL/L (ref 96–112)
CO2 SERPL-SCNC: 22 MMOL/L (ref 20–33)
CREAT SERPL-MCNC: 0.57 MG/DL (ref 0.5–1.4)
EKG IMPRESSION: NORMAL
EKG IMPRESSION: NORMAL
ERYTHROCYTE [DISTWIDTH] IN BLOOD BY AUTOMATED COUNT: 44 FL (ref 35.9–50)
ERYTHROCYTE [DISTWIDTH] IN BLOOD BY AUTOMATED COUNT: 45.1 FL (ref 35.9–50)
GLOBULIN SER CALC-MCNC: 1.9 G/DL (ref 1.9–3.5)
GLUCOSE SERPL-MCNC: 99 MG/DL (ref 65–99)
HCT VFR BLD AUTO: 39 % (ref 37–47)
HCT VFR BLD AUTO: 40.9 % (ref 37–47)
HGB BLD-MCNC: 13.2 G/DL (ref 12–16)
HGB BLD-MCNC: 13.4 G/DL (ref 12–16)
MAGNESIUM SERPL-MCNC: 2.3 MG/DL (ref 1.5–2.5)
MCH RBC QN AUTO: 32 PG (ref 27–33)
MCH RBC QN AUTO: 32.1 PG (ref 27–33)
MCHC RBC AUTO-ENTMCNC: 32.8 G/DL (ref 33.6–35)
MCHC RBC AUTO-ENTMCNC: 33.8 G/DL (ref 33.6–35)
MCV RBC AUTO: 94.9 FL (ref 81.4–97.8)
MCV RBC AUTO: 97.6 FL (ref 81.4–97.8)
PHOSPHATE SERPL-MCNC: 4.1 MG/DL (ref 2.5–4.5)
PLATELET # BLD AUTO: 190 K/UL (ref 164–446)
PLATELET # BLD AUTO: 204 K/UL (ref 164–446)
PMV BLD AUTO: 10.4 FL (ref 9–12.9)
PMV BLD AUTO: 10.7 FL (ref 9–12.9)
POTASSIUM SERPL-SCNC: 4 MMOL/L (ref 3.6–5.5)
PROT SERPL-MCNC: 6.3 G/DL (ref 6–8.2)
RBC # BLD AUTO: 4.11 M/UL (ref 4.2–5.4)
RBC # BLD AUTO: 4.19 M/UL (ref 4.2–5.4)
SODIUM SERPL-SCNC: 138 MMOL/L (ref 135–145)
WBC # BLD AUTO: 6.3 K/UL (ref 4.8–10.8)
WBC # BLD AUTO: 6.9 K/UL (ref 4.8–10.8)

## 2018-11-28 PROCEDURE — 96372 THER/PROPH/DIAG INJ SC/IM: CPT

## 2018-11-28 PROCEDURE — 93005 ELECTROCARDIOGRAM TRACING: CPT | Performed by: FAMILY MEDICINE

## 2018-11-28 PROCEDURE — G0378 HOSPITAL OBSERVATION PER HR: HCPCS

## 2018-11-28 PROCEDURE — 90471 IMMUNIZATION ADMIN: CPT

## 2018-11-28 PROCEDURE — 94760 N-INVAS EAR/PLS OXIMETRY 1: CPT

## 2018-11-28 PROCEDURE — 80053 COMPREHEN METABOLIC PANEL: CPT

## 2018-11-28 PROCEDURE — 700102 HCHG RX REV CODE 250 W/ 637 OVERRIDE(OP): Performed by: FAMILY MEDICINE

## 2018-11-28 PROCEDURE — 700111 HCHG RX REV CODE 636 W/ 250 OVERRIDE (IP): Performed by: FAMILY MEDICINE

## 2018-11-28 PROCEDURE — 700105 HCHG RX REV CODE 258: Performed by: FAMILY MEDICINE

## 2018-11-28 PROCEDURE — 36415 COLL VENOUS BLD VENIPUNCTURE: CPT

## 2018-11-28 PROCEDURE — 93010 ELECTROCARDIOGRAM REPORT: CPT | Performed by: INTERNAL MEDICINE

## 2018-11-28 PROCEDURE — A9270 NON-COVERED ITEM OR SERVICE: HCPCS | Performed by: FAMILY MEDICINE

## 2018-11-28 PROCEDURE — 700101 HCHG RX REV CODE 250: Performed by: FAMILY MEDICINE

## 2018-11-28 PROCEDURE — 85027 COMPLETE CBC AUTOMATED: CPT | Mod: 91

## 2018-11-28 PROCEDURE — 83735 ASSAY OF MAGNESIUM: CPT

## 2018-11-28 PROCEDURE — 90686 IIV4 VACC NO PRSV 0.5 ML IM: CPT | Performed by: FAMILY MEDICINE

## 2018-11-28 PROCEDURE — 84100 ASSAY OF PHOSPHORUS: CPT

## 2018-11-28 RX ADMIN — ACETAMINOPHEN 650 MG: 325 TABLET, FILM COATED ORAL at 22:27

## 2018-11-28 RX ADMIN — HEPARIN SODIUM 5000 UNITS: 5000 INJECTION, SOLUTION INTRAVENOUS; SUBCUTANEOUS at 22:27

## 2018-11-28 RX ADMIN — INFLUENZA A VIRUS A/MICHIGAN/45/2015 X-275 (H1N1) ANTIGEN (FORMALDEHYDE INACTIVATED), INFLUENZA A VIRUS A/SINGAPORE/INFIMH-16-0019/2016 IVR-186 (H3N2) ANTIGEN (FORMALDEHYDE INACTIVATED), INFLUENZA B VIRUS B/PHUKET/3073/2013 ANTIGEN (FORMALDEHYDE INACTIVATED), AND INFLUENZA B VIRUS B/MARYLAND/15/2016 BX-69A ANTIGEN (FORMALDEHYDE INACTIVATED) 0.5 ML: 15; 15; 15; 15 INJECTION, SUSPENSION INTRAMUSCULAR at 22:27

## 2018-11-28 RX ADMIN — HEPARIN SODIUM 5000 UNITS: 5000 INJECTION, SOLUTION INTRAVENOUS; SUBCUTANEOUS at 05:28

## 2018-11-28 RX ADMIN — POTASSIUM PHOSPHATE, MONOBASIC AND POTASSIUM PHOSPHATE, DIBASIC 30 MMOL: 224; 236 INJECTION, SOLUTION INTRAVENOUS at 00:06

## 2018-11-28 RX ADMIN — ACETAMINOPHEN 650 MG: 325 TABLET, FILM COATED ORAL at 00:20

## 2018-11-28 RX ADMIN — NICOTINE 14 MG: 14 PATCH, EXTENDED RELEASE TRANSDERMAL at 05:27

## 2018-11-28 ASSESSMENT — COPD QUESTIONNAIRES
DO YOU EVER COUGH UP ANY MUCUS OR PHLEGM?: NO/ONLY WITH OCCASIONAL COLDS OR INFECTIONS
DURING THE PAST 4 WEEKS HOW MUCH DID YOU FEEL SHORT OF BREATH: NONE/LITTLE OF THE TIME
HAVE YOU SMOKED AT LEAST 100 CIGARETTES IN YOUR ENTIRE LIFE: YES
COPD SCREENING SCORE: 2

## 2018-11-28 ASSESSMENT — PAIN SCALES - GENERAL
PAINLEVEL_OUTOF10: 0
PAINLEVEL_OUTOF10: 0
PAINLEVEL_OUTOF10: 8
PAINLEVEL_OUTOF10: 3
PAINLEVEL_OUTOF10: 0

## 2018-11-28 ASSESSMENT — LIFESTYLE VARIABLES: EVER_SMOKED: YES

## 2018-11-28 NOTE — DISCHARGE PLANNING
Anticipated Discharge Disposition: D/C to Psych Facility    Action: UNJUN PERRIN completed medical clearance on legal hold. LSW requested [facility] send psych referrals to local facilities. [facility] referral sent to [facility] for review.     Barriers to Discharge: Underinsured, legal hold, psych facility acceptance.    Plan: Await response from psych facilities, LSW to arrange transport once pt has been accepted.

## 2018-11-28 NOTE — ED PROVIDER NOTES
"ED Provider Note    Scribed for Sultana Melvin M.D. by Edil Jones. 11/27/2018, 5:49 PM.    Primary care provider: Naveed Ku M.D. (Inactive)  Means of arrival: Ambulance  History obtained from: Patient  History limited by: None     CHIEF COMPLAINT  Chief Complaint   Patient presents with   • Suicidal     HPI  Karlie Santos is a 23 y.o. female who was transported to the Emergency Department via EMS due to suicidal ideation, drug overdose.  The patient took \"about 20 doses\" of her father's antidepressant medication at 12 PM this afternoon with positive suicidal intent. The patient did not know the name of the medication, but describes the medication as \"round blue pills\". After speaking with patient's father over the phone he identifies the medication as Wellbutrin.   The patient denies any other drug use. No alcohol use.    The patient was asymptomatic until 3 PM when she had an onset of nausea and dizziness. The patient then states she experienced a seizure. She states she remembers having the seizure, which caused the patient's sister to call EMS for transport to the ED. The patient states her nausea has resolved without intervention but she still complains of some mild dizziness. EMS treated the patient with 4 mg Zofran, 2 mg Versed, and 1 L of IV fluids en route to the ED.     The patient has a history of suicidal attempt when she was 18 years old. The patient usually takes Prozac, but states she ran out of her Prozac about one week ago and has not been able to refill her Prozac secondary to lack of finance. The patient denies any fever, chest pain, shortness of breath, heart palpitations, numbness, or weakness.        REVIEW OF SYSTEMS  Pertinent positives include suicidal ideation, dizziness. Pertinent negatives include no  fever, chest pain, shortness of breath, heart palpitations, numbness, or weakness. As above, all other systems reviewed and are negative.   See HPI for further details.     PAST " "MEDICAL HISTORY  History of suicidal attempt at 18 years old.     SURGICAL HISTORY  Past Surgical History:   Procedure Laterality Date   • PRIMARY C SECTION Bilateral 7/16/2016    Procedure: PRIMARY C SECTION;  Surgeon: Michael Underwood M.D.;  Location: LABOR AND DELIVERY;  Service:    • OTHER ORTHOPEDIC SURGERY      surgery on R femur at 5yo     SOCIAL HISTORY  Social History   Substance Use Topics   • Smoking status: Current Every Day Smoker     Packs/day: 0.50     Types: Cigarettes     Start date: 1/1/2008   • Smokeless tobacco: Never Used   • Alcohol use Yes      Comment: Very occationally      History   Drug Use   • Types: Inhaled     Comment: Marijuana - Last used when found out she was pregnant     FAMILY HISTORY  No pertinent family history.     CURRENT MEDICATIONS  Home Medications     Reviewed by Aneta Loyd (Pharmacy Tech) on 11/27/18 at 1812  Med List Status: Complete   Medication Last Dose Status   fluoxetine (PROZAC) 40 MG capsule > 1 week Active                ALLERGIES  No Known Allergies    PHYSICAL EXAM  VITAL SIGNS: Pulse (!) 138   Temp 37 °C (98.6 °F) (Temporal)   Resp 16   Ht 1.702 m (5' 7\")   Wt 68 kg (150 lb)   LMP 11/23/2018 (Exact Date)   SpO2 96%   BMI 23.49 kg/m²   Vitals reviewed.  Consitutional: Well-developed, well-nourished. Negative for: distress.  HENT: Normocephalic, right external ear normal, left external ear normal, Mucous membranes are dry. Contusion to tip of tongue.   Eyes: PERRLA at 2 mm. Conjunctivae normal, extraocular movements normal. Negative for: discharge in right and left eye, icterus.  Neck: Range of motion normal, supple. Negative for cervical adenopathy.  Cardiovascular: Tachycardic rate, regular rhythm, heart sounds normal, intact distal pulses. Negative for: murmur, rub, gallop.  Pulmonary/Chest Wall: Effort normal, breath sounds normal. Negative for: respiratory distress, wheezes, rales, rhonchi.   Abdominal: Mild tenderness in left upper quadrant. " Soft, bowel sounds normal. Negative for: distention, rebound, guarding.  Musculoskeletal: Normal range of motion. Negative for edema.  Neurological: Alert and oriented x3. No focal deficits.  Skin: Warm, dry. Negative for rash.  Psych: Mood/affect flat.  Slow to respond.  Positive depression and suicidal ideation with attempt.      DIAGNOSTIC STUDIES / PROCEDURES    LABS  Results for orders placed or performed during the hospital encounter of 11/27/18   HCG QUAL SERUM   Result Value Ref Range    Beta-Hcg Qualitative Serum Negative Negative   URINE DRUG SCREEN (TRIAGE)   Result Value Ref Range    Amphetamines Urine Negative Negative    Barbiturates Negative Negative    Benzodiazepines Positive (A) Negative    Cocaine Metabolite Negative Negative    Methadone Negative Negative    Opiates Negative Negative    Oxycodone Negative Negative    Phencyclidine -Pcp Negative Negative    Propoxyphene Negative Negative    Cannabinoid Metab Negative Negative   Blood Alcohol   Result Value Ref Range    Diagnostic Alcohol 0.00 0.00 g/dL   CBC WITH DIFFERENTIAL   Result Value Ref Range    WBC 13.8 (H) 4.8 - 10.8 K/uL    RBC 4.37 4.20 - 5.40 M/uL    Hemoglobin 13.9 12.0 - 16.0 g/dL    Hematocrit 40.5 37.0 - 47.0 %    MCV 92.7 81.4 - 97.8 fL    MCH 31.8 27.0 - 33.0 pg    MCHC 34.3 33.6 - 35.0 g/dL    RDW 41.8 35.9 - 50.0 fL    Platelet Count 226 164 - 446 K/uL    MPV 10.2 9.0 - 12.9 fL    Neutrophils-Polys 83.10 (H) 44.00 - 72.00 %    Lymphocytes 11.60 (L) 22.00 - 41.00 %    Monocytes 4.30 0.00 - 13.40 %    Eosinophils 0.10 0.00 - 6.90 %    Basophils 0.50 0.00 - 1.80 %    Immature Granulocytes 0.40 0.00 - 0.90 %    Nucleated RBC 0.00 /100 WBC    Neutrophils (Absolute) 11.49 (H) 2.00 - 7.15 K/uL    Lymphs (Absolute) 1.60 1.00 - 4.80 K/uL    Monos (Absolute) 0.59 0.00 - 0.85 K/uL    Eos (Absolute) 0.02 0.00 - 0.51 K/uL    Baso (Absolute) 0.07 0.00 - 0.12 K/uL    Immature Granulocytes (abs) 0.06 0.00 - 0.11 K/uL    NRBC (Absolute) 0.00  K/uL   COMP METABOLIC PANEL   Result Value Ref Range    Sodium 139 135 - 145 mmol/L    Potassium 3.5 (L) 3.6 - 5.5 mmol/L    Chloride 108 96 - 112 mmol/L    Co2 22 20 - 33 mmol/L    Anion Gap 9.0 0.0 - 11.9    Glucose 108 (H) 65 - 99 mg/dL    Bun 13 8 - 22 mg/dL    Creatinine 0.74 0.50 - 1.40 mg/dL    Calcium 9.9 8.5 - 10.5 mg/dL    AST(SGOT) 11 (L) 12 - 45 U/L    ALT(SGPT) 13 2 - 50 U/L    Alkaline Phosphatase 83 30 - 99 U/L    Total Bilirubin 0.4 0.1 - 1.5 mg/dL    Albumin 4.8 3.2 - 4.9 g/dL    Total Protein 7.3 6.0 - 8.2 g/dL    Globulin 2.5 1.9 - 3.5 g/dL    A-G Ratio 1.9 g/dL   LIPASE   Result Value Ref Range    Lipase 8 (L) 11 - 82 U/L   Urinalysis   Result Value Ref Range    Color Yellow     Character Clear     Specific Gravity 1.006 <1.035    Ph 6.0 5.0 - 8.0    Glucose Negative Negative mg/dL    Ketones Negative Negative mg/dL    Protein Negative Negative mg/dL    Bilirubin Negative Negative    Urobilinogen, Urine 0.2 Negative    Nitrite Negative Negative    Leukocyte Esterase Negative Negative    Occult Blood Negative Negative    Micro Urine Req see below    Acetaminophen Level   Result Value Ref Range    Acetaminophen -Tylenol <10 10 - 30 ug/mL   SALICYLATE LEVEL   Result Value Ref Range    Salicylates, Quant. 0 (L) 15 - 25 mg/dL   ESTIMATED GFR   Result Value Ref Range    GFR If African American >60 >60 mL/min/1.73 m 2    GFR If Non African American >60 >60 mL/min/1.73 m 2   POC BREATHALIZER   Result Value Ref Range    POC Breathalizer 0.004 0.00 - 0.01 Percent   EKG   Result Value Ref Range    Report       [facility] Emergency Dept.    Test Date:  2018  Pt Name:    RADHA ETIENNE                  Department: ER  MRN:        [id number]                      Room:       [facility]  Gender:     Female                       Technician: [id number]  :        1995                   Requested By:ER TRIAGE PROTOCOL  Order #:    [id number]                    Glenys MD: TONY ANDRES  MD    Measurements  Intervals                                Axis  Rate:       133                          P:          83  ID:         [id number]                          QRS:        62  QRSD:       78                           T:          14  QT:         236  QTc:        351    Interpretive Statements  SINUS TACHYCARDIA  QTc 531  No previous ECG available for comparison    Electronically Signed On 11- 18:15:11 PST by TONY ANDRES MD        All labs reviewed by me.    EKG Interpretation:  Twelve-lead EKG by my interpretation shows sinus tachycardia at a rate of 133.  Normal axis.  .  No ST segment changes to indicate ischemia or infarct.  No old EKGs available for comparison.    RADIOLOGY  No orders to display     The radiologist's interpretation of all radiological studies have been reviewed by me.    COURSE & MEDICAL DECISION MAKING  Nursing notes, VS, PMSFHx reviewed in chart.    5:49 PM Patient seen and examined at bedside. The patient presents for drug ingestion, suicidal ideation and the differential diagnosis includes but is not limited to prescription versus OTC overdose. Ordered blood alcohol, urine drug screen, CBC, CMP, lipase, urinalysis, acetaminophen level, salicylate level. The patient was resuscitate with IV fluids for tachycardia and dry mucous membranes.     6:05 PM Patient gave consent to call her father to obtain specific name of the medication she took. Spoke with patient's father, who states medication is Wellbutrin.     6:10 PM Spoke with pharmacy, who believes the medication is Wellbutrin  mg from father's description.     6:11 PM Poison control was contacted, who recommends telemetry, supportive measures, benzodiazepines for seizures, and at least 24 hours observation.     6:45 PM Discussed patient's condition with [facility] family medicine who is agreeable to admit the patient. [facility] is aware of poison control's recommendations.     7:15 PM Response to IV fluids was  wetter mucous membranes and decreased tachycardia.  Patient feels nauseated, so she was given Zofran.      Disposition:  Patient will be admitted to the hospital under the care of  ([facility]) in guarded condition.     CRITICAL CARE  The very real possibilty of a deterioration of this patient's condition required the highest level of my preparedness for sudden, emergent intervention.  I provided critical care services, which included medication orders, frequent reevaluations of the patient's condition and response to treatment, ordering and reviewing test results, and discussing the case with various consultants.  The critical care time associated with the care of the patient was 35 minutes. Review chart for interventions. This time is exclusive of any other billable procedures.     FINAL IMPRESSION  1. Intentional drug overdose, initial encounter (HCC)    2. Severe episode of recurrent major depressive disorder, without psychotic features (HCC)          Edil ALANIZ (Scribe), am scribing for, and in the presence of, Sultana Melvin M.D..    Electronically signed by: Edil Jones (Marckibe), 11/27/2018    Sultana ALANIZ M.D. personally performed the services described in this documentation, as scribed by Edil Jones in my presence, and it is both accurate and complete.    The note accurately reflects work and decisions made by me.  Sultana Melvin  11/27/2018  7:16 PM

## 2018-11-28 NOTE — DISCHARGE PLANNING
Anticipated Discharge Disposition: D/C to Psych Facility    Action: LSW completed chart review and noted legal hold start time was mistakenly entered as beginning on 11/2, however, hold was started on 11/27 @ 2588. Flowsheets updated to reflect this.     Legal hold requires certification to be completed, LSW paged [facility] to request this be done during rounds.      Barriers to Discharge: Medical clearance, legal hold.    Plan: Await completion of legal hold certification, LSW to forward hold to legal hold [facility] once this has been done.

## 2018-11-28 NOTE — CARE PLAN
Problem: Safety  Goal: Will remain free from injury  Outcome: PROGRESSING AS EXPECTED    Goal: Will remain free from falls  Outcome: PROGRESSING AS EXPECTED      Problem: Psychosocial Needs:  Goal: Level of anxiety will decrease  Outcome: PROGRESSING SLOWER THAN EXPECTED

## 2018-11-28 NOTE — PROGRESS NOTES
Patient transported from ED to tele 8. ST up to 110 bpm on monitor. Sitter at bedside and seizure/aspiration/fall precautions initiated. All cords and personal belongings removed from room. Initial assessment completed (pupils dilated), orders reviewed, bed alarm in use, and hourly rounding in place. POC addressed with patient, no additional questions at this time.    Plan: monitor for safety, monitor for s/s of reaction from Wellbutrin, neuro checks q4h

## 2018-11-28 NOTE — PSYCHIATRY
"PSYCHIATRIC CONSULTATION:  Reason for admission:  23 year old female admitted following suicide attempt with overdose on Buproprion    Reason for consult: Suicide Attempt   Requesting Physician: Parisa Brunson M.D.    Legal status:  On hold, extended      Chief Complaint: \"I took a bunch of pills and had a seizure      HPI:   23 year old female presented following overdose on Buproprion yesterday. Patient states she has been feeling depressed for several years with worsening of mood symptoms within the last month. She reports anhedonia, changes in sleep, poor energy, and feelings of hopelessness. She states that she woke up yesterday \"feeling okay\" but progressively began to have increased feelings of depression. She states she has been thinking about suicide consistently for years. She also notes auditory hallucinations of several people speaking with her making primarily derogatory comments towards her. Prior to overdosing patient states the voices told her to take the pills. The pills are her father's and she found them in his drawer. She states after this she felt nothing would happen for some time and did not tell anyone she took them. She started to feel increasingly ill after taking them within her home and went to tell her sister to call for help but was unable to find her sister. She therefore went and slept on her bed and states she next remembers being awoken by her sister after being told she had a seizure. She states she regretted taking the pills after she took them and is grateful to be alive. She denies current suicidal ideation.       Psychiatric Review of Systems:current symptoms as reported by pt.  Depression: As noted in HPI        Marine: endorses period of up to 2 days with increased energy, rapid speech, increased goal directed activity that is noticeable by family members   Anxiety/Panic Attacks states this is consistently elevated   PTSD symptom:   Psychosis: Auditory hallucinations of voices " "making derogatory statements not present at this time but occur on and off throughout the day , denies visual hallucinatoins       Medical Review of Systems: as reported by pt. All systems reviewed. Only those found to be + are noted below. All others are negative.   Neurological:    TBIs:    SZs: notes seizure prior to arrival  Other medical symptoms:   Thyroid: not present    Diabetes: not present    Cardiovascular disease: not present     Psychiatric Examination: observed phenomenon:  Vitals:   Vitals:    11/28/18 0008 11/28/18 0056 11/28/18 0410 11/28/18 0806   BP: 101/62  104/58 (!) 90/64   Pulse: 88  81 90   Resp: 18 18 18 16   Temp: 36.7 °C (98 °F)  36.4 °C (97.6 °F) 36.8 °C (98.3 °F)   TempSrc: Temporal  Temporal Temporal   SpO2: 96% 97% 99% 97%   Weight:       Height:         Musculoskeletal: no psychomotor agitation or retardation noted on exam   Appearance: 23 year old female appropriate to stated age in hospital attire sitting in bed   Thoughts: linear, logical   Speech: quiet, mild latency of speech, limited spontaneous verbal responses, no stuttering of speech, no slurring of speech   Mood: \"pretty sad\"     Affect: Flat, depressed       SI/HI: denies/denies   Attention/Alertness: Alert and attentive     Memory: appears in tact       Orientation: oriented to self, place, and situation       Fund of Knowledge: appears in tact       Insight/Judgement into symptoms: fair/limited       Past Psychiatric Hx:   Hospitalized under legal hold when she was 18 for slitting wrists- she states this was not a suicide attempt and was self harming behavior. Denies other self harm behavior and states she has not done this within the last 2 years.  No prior suicide attempts  States she was taking Prozac previously but would take it \"as needed\". She last saw her primary care provider through the [facility] clinic in July     Family Psychiatric Hx:   Mother - Depression  Father - Bipolar Disorder     Social Hx:   Patient lives " with her parents at this time. There are four younger siblings within the home as well, she also has a 2 year old son in the home. The son's father is not involved in her life at this time. She has had 3 miscarriages and notes that she started the Prozac following the last miscarriage within this year. 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     Drug/Alcohol/Tobacco Hx:   Drugs: Marijuana use - few times per week; denies other substances    Alcohol: 2-3 drinks on the weekend    Tobacco: 1 pack per day     Medical Hx: labs, MARS, medications, etc were reviewed. Only those findings of potential interest to psychiatry are noted below:    Allergies: Patient has no known allergies.  Medications: [unfilled]  Labs:   Lab Results   Component Value Date/Time    SODIUM 138 11/28/2018 03:08 AM    POTASSIUM 4.0 11/28/2018 03:08 AM    CHLORIDE 108 11/28/2018 03:08 AM    CO2 22 11/28/2018 03:08 AM    GLUCOSE 99 11/28/2018 03:08 AM    BUN 7 (L) 11/28/2018 03:08 AM    CREATININE 0.57 11/28/2018 03:08 AM      ECG: QTc 428     Cranial Imaging:   None Available    ASSESSMENT:  Unspecified Depressive Disorder: R/O Bipolar II Disorder with psychotic features vs Major Depressive Disorder with psychotic features   Suicide Attempt    23 year old female admitted following suicide attempt by overdosing on father's Buproprion pills, unknown amount. Patient continues to present with flat affect, very depressed, notes that she has command auditory hallucinations that contributed to her suicide attempt and multiple stressors within her life. She has a family history of bipolar disorder and notes periods of possible tomas for 2-3 days. She does not feel she is suicidal at this time, but continues to present with limited future orientation and depressed mood presenting a risk to herself.       PLAN:  -Will obtain collateral from patient's parents   -Will consider initiating Lithium pending further collateral  information - patient appears to possibly have symptoms of hypomania that are unclear at this time    Legal status: On hold, extended    Will Follow  Thank you for the consult.

## 2018-11-28 NOTE — PROGRESS NOTES
"[facility] PROGRESS NOTE     Attending: Dr. Waldo RUSSO    Resident: Parisa Brunson MD PGY-1    PATIENT: Karlie Santos; [id number]; 1995    ID: 23 y.o. female with a PMHx of depression and anxiety admitted for suicide attempt with overdose of wellbutrin    SUBJECTIVE: No acute events overnight. Patient states that she is still slightly nauseated. No emesis since admission. She denies active thoughts of SI. Patient states that she ingested the tablets and then began to have second thoughts- she attempted to \"throw them up\" but was unable.     OBJECTIVE:     Vitals:    11/27/18 2057 11/28/18 0000 11/28/18 0008 11/28/18 0056   BP: 104/53  101/62    Pulse: (!) 108 86 88    Resp: 18 18 18   Temp: 36.7 °C (98 °F)  36.7 °C (98 °F)    TempSrc: Temporal  Temporal    SpO2: 97% 97% 96% 97%   Weight: 78.1 kg (172 lb 2.9 oz)      Height: 1.702 m (5' 7\")          Intake/Output Summary (Last 24 hours) at 11/28/18 0607  Last data filed at 11/28/18 0006   Gross per 24 hour   Intake              640 ml   Output              450 ml   Net              190 ml       PE:  General: No acute distress, resting comfortably in bed.  HEENT: NC/AT. PERRLA. EOMI. MMM  Cardiovascular: RRR with no M/R/G.  Respiratory: Symmetrical chest. CTAB with no W/R/R  Abdomen: soft, NT/ND, no masses, +BS   EXT:  GUZMÁN, %/5 strength, No C/C/E 2+ pulses   Neuro: non focal with no numbness, tingling or changes in sensation  Psych: flat affect, answers examiner's questions in one word answers, denies SI/HI/hallucinations    LABS:  Recent Labs      11/27/18 1815 11/27/18 2121   WBC  13.8*  11.2*   RBC  4.37  4.13*   HEMOGLOBIN  13.9  13.3   HEMATOCRIT  40.5  38.8   MCV  92.7  93.9   MCH  31.8  32.2   RDW  41.8  42.6   PLATELETCT  226  194   MPV  10.2  10.7   NEUTSPOLYS  83.10*   --    LYMPHOCYTES  11.60*   --    MONOCYTES  4.30   --    EOSINOPHILS  0.10   --    BASOPHILS  0.50   --      Recent Labs      11/27/18   1815  11/28/18   0308   SODIUM  139  " 138   POTASSIUM  3.5*  4.0   CHLORIDE  108  108   CO2  22  22   BUN  13  7*   CREATININE  0.74  0.57   CALCIUM  9.9  8.7   MAGNESIUM  1.8  2.3   PHOSPHORUS  1.0*  4.1   ALBUMIN  4.8  4.4     Estimated GFR/CRCL = Estimated Creatinine Clearance: 168.1 mL/min (by C-G formula based on SCr of 0.57 mg/dL).  Recent Labs      11/27/18 1815 11/28/18   0308   GLUCOSE  108*  99     Recent Labs      11/27/18 1815 11/28/18   0308   ASTSGOT  11*  13   ALTSGPT  13  10   TBILIRUBIN  0.4  0.6   ALKPHOSPHAT  83  70   GLOBULIN  2.5  1.9             No results for input(s): INR, APTT, FIBRINOGEN in the last 72 hours.    Invalid input(s): DIMER    MICROBIOLOGY: None    IMAGING:  None    MEDS:  Current Facility-Administered Medications   Medication Last Dose   • Respiratory Care per Protocol     • 0.9 % NaCl with KCl 20 mEq infusion     • heparin injection 5,000 Units 5,000 Units at 11/28/18 0528   • labetalol (NORMODYNE,TRANDATE) injection 10 mg     • nicotine (NICODERM) 14 MG/24HR 14 mg 14 mg at 11/28/18 0527    And   • nicotine polacrilex (NICORETTE) 2 MG piece 2 mg     • acetaminophen (TYLENOL) tablet 650 mg 650 mg at 11/28/18 0020   • LORazepam (ATIVAN) injection 4 mg     • Influenza Vaccine Quad pf injection 0.5 mL         PROBLEM LIST:  No problems updated.    ASSESSMENT/PLAN: 23 y.o. female with a PMHx of depression and anxiety admitted for suicide attempt with overdose of wellbutrin     # Suicide Attempt  # Wellbutrin overdose  # Depression  # Seizure  # Tachycardia  # Prolonged QTc, 531  - overdose around 12PM with approximately 20 wellbutrin tabs, dose unknown  - denies any other medicaitons, drugs, or alcohol  - Second suicide attempt, first at age 18, patient cut her wrists  - Patient reports feeling useless, lonely, and sad  - Difficulty sleeping, apathetic; patient reports good appetite  - Ran out of Prozac 40mg about 1 week ago -> filled per PCP  - Has never seen counselor- will need to be seen as outpatient  - Had  seizure about 3 hours after ingesting pills- > seizure precautions w/ ativan PRN  - QTc 531, 2 gm Mag ordered   - Most recent QTc 423  - received 4 mg Zofran, 2 mg Versed, and 1 L of IV fluids en route to the ED  - UDS: + Benzos (given versed per EMS) otherwise negative  - Negative for alcohol/Tylenol/Salicylates  - Poison control called by EMS, case number [id number]  - Poison Control recommendations              - 24 hours observation period              - monitor for prolonged QTc, if >500, give 1-2g Mag              - monitor QRS, if >120msec 1-2 amps sodium bicarb push              - maximize electrolytes              - treat hyperthermia with physical cooling measures, not tylenol              - tachycardia should resolve spontaneously, do not need to medicate              - monitor for hypotension, should respond to IVFs  - patient on legal hold, will need psych evaluation  - suicide precautions  - IVFs: NS + 20KCl @ 125ml/hr   - 1:1 sitter      Plan:  1. Psych evaluation for suicide attempt and legal hold  2. F/u SW  3. ADAT  4. F/up Pison control recommendations    #Hypophosphemia- resolved  -Phos 1-> 4.1   - received 30mmol in house  - continue to monitor and replenish as needed    #Hypomagnesemia- resolved  - Received 2gm en route to hospital and another in house  - 1.8 -> 2.3     Core Measures:  Lines: PIV  Govea: None  Abx: None  DVT ppx: SQH  GI ppx: none  Diet: regular  Immunizations: needs flu shot  PCP: Fredis Ku MD     Code status: Full     Dispo: Admit on legal hold to telemetry with cardiac monitor    Parisa Brunson PGY-1

## 2018-11-28 NOTE — DISCHARGE PLANNING
Anticipated Discharge Disposition: D/C to Psych Facility    Action: [facility] completed legal hold certification, LSW forwarded to legal hold ELVER Murphy. Anticipate that pt will be medically clear this afternoon after she has been observed for 24 hours.    Barriers to Discharge: Legal hold, medical clearance.    Plan: LSW to send psych facility referrals once medical clearance is complete.

## 2018-11-28 NOTE — DISCHARGE SUMMARY
[facility] TRANSFER SUMMARY     PATIENT ID:  Name:             Karlie Santos     YOB: 1995  Age:                 23 y.o.  female   MRN:               [id number]  Address:         [address] [place] [address]  Phone:            [phone] (home)    ADMISSION DATE:   11/27/2018    DISCHARGE DATE:   11/29/2018    DISCHARGE DIAGNOSES:   Suicide Attempt  Wellbutrin Overdose  Depression  Seizure  Tachycardia    ATTENDING PHYSICIAN(S):   DO Mily Hernandez MD    RESIDENT(S):   Nabil Cedillo DO, MPH  Parisa Brunson MD    CONSULTANTS:    Psychiatry    PROCEDURES:    None    VITALS:  Vitals:    11/29/18 1214   BP: 115/75   Pulse: 86   Resp: 18   Temp: 37.2 °C (99 °F)   SpO2: 98%       PHYSICAL EXAM:  General: No acute distress, afebrile, resting comfortably in bed  HEENT: NC/AT. EOMI. MMM, neck supple without adenopathy or mass  Cardiovascular: RRR, NMGR, cap refill brisk.  Respiratory: CTAB, no tachypnea or retractions  Abdomen: soft, NT/ND, no masses, +BS  EXT:  GUZMÁN, no edema, no erythema/lesion   Neuro: Non-focal      IMAGING:   None    LABS:  Recent Labs      11/27/18 1815 11/27/18 2121 11/28/18   0948   WBC  13.8*  11.2*  6.9   RBC  4.37  4.13*  4.11*   HEMOGLOBIN  13.9  13.3  13.2   HEMATOCRIT  40.5  38.8  39.0   MCV  92.7  93.9  94.9   MCH  31.8  32.2  32.1   RDW  41.8  42.6  44.0   PLATELETCT  226  194  190   MPV  10.2  10.7  10.7   NEUTSPOLYS  83.10*   --    --    LYMPHOCYTES  11.60*   --    --    MONOCYTES  4.30   --    --    EOSINOPHILS  0.10   --    --    BASOPHILS  0.50   --    --      Recent Labs      11/27/18 1815 11/28/18   0308   SODIUM  139  138   POTASSIUM  3.5*  4.0   CHLORIDE  108  108   CO2  22  22   GLUCOSE  108*  99   BUN  13  7*     No results found for: CHOLSTRLTOT, LDL, HDL, TRIGLYCERIDE    No results found for: TROPONINI, CKMB  No results found for: TROPONINI, CKMB       HISTORY OF PRESENT ILLNESS:  Karlie Santos is a 24yo female with a  PMHx of depression and anxiety who presented to the emergency department via EMS shortly after experiencing a possible seizure. The patient reportedly took ~20 tablets of her father's Wellbutrin medication in an attempt to commit suicide on 11/27/2018 at 1200. The patient began to feel nauseous and dizzy which prompted her to ask her sister for help. The patient reportedly lied down on her sister's floor and when she awoke her brother and sister were in the report and report that she had a seizure. The patient could not recall the event, denies loss of bowel and bladder functioning, and has no tongue lacerations.    ED Course: Poison control was contacted in the ED and they recommended that the patient be observed for 24 hours, be placed on telemetry, watch QTc interval, monitor QRS, maximize electrolytes, treat hyperthermia with cooling measures only, and support her with fluids. An EKG was performed which showed a QTc of 351 and a QRS of 62. She has a heart rate of 133. Poison control case number was [id number].    HOSPITAL COURSE:   #Suicide Attempt  #Wellbutrin Overdose  #Depression  #Seizure  The patient was admitted to medical telemetry. Three EKGs were performed which demonstrated normalization of the patient's QTc. The patient has a sitter during her stay in the hospital, as she was placed on a legal hold. The patient did not elaborate on why she chose to to attempt suicide. The patient was given fluids and electrolytes remained normal. The patient's initial phosphorous and magnesium was low, however this was corrected. Psychiatry was consulted to see the patient and they planned on obtaining more collateral information from the patient's parents in addition to considering starting Lithium, as the patient was exhibiting some symptoms of hypomania. On her final day in the hospital she was started on [place] by inpatient psychiatry. Ativan was made available if the patient was to experience another  seizure.    The patient admitted during her course that she regretted taking the medication after she started to feel the side effects. She admits that she is not suicidal at this time, however this is her second attempt. The patient admits that she tried to cut her wrists when she was 18 years old. The patient was medically cleared at 1200 on 11/28/2018 after 24 hours of observation. The patient understood that she was to be transferred to [facility].    On the day of transfer the patient was found to be in stable condition. She still exhibited a flat affect and depressed mood. She was instructed to take her medications as prescribed and to seek counseling and make use of outside resources for depression/anxiety. She was counseled to seek help if she begins to contemplate suicide. The document for transfer was signed and placed in the patient's chart. The patient was found to be medically cleared for discharge to [facility] psychiatric facility.     DISCHARGE CONDITION:    Stable    DISPOSITION:   [facility]    DISCHARGE MEDICATIONS:      Medication List      START taking these medications      Instructions   lithium 300 MG Tabs  Start taking on:  11/30/2018  Commonly known as:  ESKALITH   Take 1 Tab by mouth every day for 30 days.  Dose:  300 mg        STOP taking these medications    fluoxetine 40 MG capsule  Commonly known as:  PROZAC            ACTIVITY:   Normal Activity as Tolerated.    DIET:   Healthy    DISCHARGE INSTRUCTIONS AND FOLLOW UP:  Patient is medically stable for discharge and will be discharged to [facility]    Follow Up:  Outpatient counseling via psychiatrist/psychologist  Primary care provider    Discharge Instructions:   Patient was instructed to return the ER in the event of worsening symptoms including but not limited to irregular heart beats, increased anxiety/depression, thoughts of suicide, seizures, or any other major concerns. Patient understands that failure to do so may indicate  worsening of her medical conditions and result in adverse clinical outcomes including fatality. We have counseled the patient on the importance of compliance and the patient has agreed to proceed with all medical recommendations and follow up plan indicated above. The patient understands that the failure to do so may result in result in adverse clinical outcomes including fatality.       CC:   [facility]

## 2018-11-28 NOTE — ED NOTES
Pt belongings removed and security called. Pt's belongings assessed and cleared by security. Pt has ONE personal belongings bag, placed in LOCKER #3. Room made safe, medically necessary equipment present. ED tech outside of room for direct observations, unobstructed view of pt.

## 2018-11-28 NOTE — DISCHARGE PLANNING
Agency/Facility Name: [facility]  Spoke To: Nel  Outcome: Due to pending medicaid patient would be self-pay or IVETH.

## 2018-11-28 NOTE — ED NOTES
Med Rec complete per Pt at bedside  Allergies Reviewed  No ABX in the last 30 days    Pt states that she took about #20 Wellbutrin @ 1200 on 11/27. Pt states that she does not know the strength of the Wellbutrin.

## 2018-11-28 NOTE — PROGRESS NOTES
0730: Bedside report received.  Pt resting in bed.  Sitter at bedside, as pt is on legal hold for suicide attempt.  Educated to call for assistance as needed.

## 2018-11-28 NOTE — DISCHARGE PLANNING
Agency/Facility Name: [facility], [facility], [facility]  Plan or Request: Medical Clearance, Legal Hold, and referral faxed to the above facilities.
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
## 2018-11-28 NOTE — ED TRIAGE NOTES
"Karlie Santos  Pt bib EMS. Pt changed into gown and placed on monitor.     Chief Complaint   Patient presents with   • Suicidal     Per EMS, pt's younger sibling called EMS, d/t pt having a seizure. Pt states she remembers having the seizure. Pt states she took approx 20 pills of her father's prescription. Pt unsure of medication name, but states the pills are \"blue and round and I think they are for depression.\" Pt was given a total of 2mg of versed and 4mg zofran with 1L of NS. Pt states she did have a emesis episode prior to EMS arrival, but denies vomit having pills present. Pt is currently AOx4, GCS 15. SI screening completed, pt scored a 6 on SAD scale.   Dr Melvin at bedside now.     "

## 2018-11-29 VITALS
SYSTOLIC BLOOD PRESSURE: 115 MMHG | HEART RATE: 86 BPM | OXYGEN SATURATION: 98 % | TEMPERATURE: 99 F | DIASTOLIC BLOOD PRESSURE: 75 MMHG | HEIGHT: 67 IN | WEIGHT: 172.18 LBS | RESPIRATION RATE: 18 BRPM | BODY MASS INDEX: 27.02 KG/M2

## 2018-11-29 LAB
EKG IMPRESSION: NORMAL
ERYTHROCYTE [DISTWIDTH] IN BLOOD BY AUTOMATED COUNT: 43.5 FL (ref 35.9–50)
HCT VFR BLD AUTO: 44.3 % (ref 37–47)
HGB BLD-MCNC: 14.7 G/DL (ref 12–16)
MCH RBC QN AUTO: 31.8 PG (ref 27–33)
MCHC RBC AUTO-ENTMCNC: 33.2 G/DL (ref 33.6–35)
MCV RBC AUTO: 95.9 FL (ref 81.4–97.8)
PLATELET # BLD AUTO: 223 K/UL (ref 164–446)
PMV BLD AUTO: 10.3 FL (ref 9–12.9)
RBC # BLD AUTO: 4.62 M/UL (ref 4.2–5.4)
WBC # BLD AUTO: 9 K/UL (ref 4.8–10.8)

## 2018-11-29 PROCEDURE — G0378 HOSPITAL OBSERVATION PER HR: HCPCS

## 2018-11-29 PROCEDURE — A9270 NON-COVERED ITEM OR SERVICE: HCPCS | Performed by: FAMILY MEDICINE

## 2018-11-29 PROCEDURE — 85027 COMPLETE CBC AUTOMATED: CPT

## 2018-11-29 PROCEDURE — 93010 ELECTROCARDIOGRAM REPORT: CPT | Performed by: INTERNAL MEDICINE

## 2018-11-29 PROCEDURE — 96372 THER/PROPH/DIAG INJ SC/IM: CPT

## 2018-11-29 PROCEDURE — 700102 HCHG RX REV CODE 250 W/ 637 OVERRIDE(OP): Performed by: STUDENT IN AN ORGANIZED HEALTH CARE EDUCATION/TRAINING PROGRAM

## 2018-11-29 PROCEDURE — A9270 NON-COVERED ITEM OR SERVICE: HCPCS | Performed by: STUDENT IN AN ORGANIZED HEALTH CARE EDUCATION/TRAINING PROGRAM

## 2018-11-29 PROCEDURE — 36415 COLL VENOUS BLD VENIPUNCTURE: CPT

## 2018-11-29 PROCEDURE — 700102 HCHG RX REV CODE 250 W/ 637 OVERRIDE(OP): Performed by: FAMILY MEDICINE

## 2018-11-29 PROCEDURE — 700111 HCHG RX REV CODE 636 W/ 250 OVERRIDE (IP): Performed by: FAMILY MEDICINE

## 2018-11-29 PROCEDURE — 93005 ELECTROCARDIOGRAM TRACING: CPT | Performed by: FAMILY MEDICINE

## 2018-11-29 RX ORDER — LITHIUM CARBONATE 300 MG
300 TABLET ORAL DAILY
Qty: 30 TAB | Refills: 0 | Status: SHIPPED | OUTPATIENT
Start: 2018-11-30 | End: 2018-12-30

## 2018-11-29 RX ORDER — LITHIUM CARBONATE 300 MG
300 TABLET ORAL DAILY
Status: DISCONTINUED | OUTPATIENT
Start: 2018-11-29 | End: 2018-11-29 | Stop reason: HOSPADM

## 2018-11-29 RX ADMIN — NICOTINE 14 MG: 14 PATCH, EXTENDED RELEASE TRANSDERMAL at 06:00

## 2018-11-29 RX ADMIN — HEPARIN SODIUM 5000 UNITS: 5000 INJECTION, SOLUTION INTRAVENOUS; SUBCUTANEOUS at 06:00

## 2018-11-29 RX ADMIN — LITHIUM CARBONATE 300 MG: 300 TABLET ORAL at 10:30

## 2018-11-29 ASSESSMENT — PAIN SCALES - GENERAL
PAINLEVEL_OUTOF10: 0

## 2018-11-29 NOTE — PROGRESS NOTES
Monitor summary: 0.20/0.08/0.36   Sinus Rhythm with ST elevation 73 - 85  With no ectopy  (EKG showed normal sinus rhythm)

## 2018-11-29 NOTE — CARE PLAN
Problem: Safety  Goal: Will remain free from injury  Outcome: PROGRESSING AS EXPECTED  Patient will remain free from injury during shift.    Problem: Pain Management  Goal: Pain level will decrease to patient's comfort goal  Outcome: PROGRESSING AS EXPECTED  Patient's headache will remain at 1 or below and allow comfort at rest during shift.    Problem: Psychosocial Needs:  Goal: Level of anxiety will decrease  Outcome: PROGRESSING AS EXPECTED  Patient will have mild to no anxiety during shift.

## 2018-11-29 NOTE — PSYCHIATRY
"PSYCHIATRIC FOLLOW UP:    Reason for Admission: 23 year old female admitted following suicide attempt   Legal hold status:   On hold, extended   Psychiatric Supervising Attending: Dr. Egan     HPI:    23 year old female admitted following suicide attempt with overdose of Wellbutrin. Patient states that she is feeling okay this morning, continues to have depressed mood. Patient continues to present with restricted and depressed affect, affect brightens when speaking about her son. Patient states her mood is a 7/10 with 10 being the worst she has felt with suicidal ideation. She continues to state she is glad she survived and regrets ingestion of medication. She states she is hopeful to go to college one day and would like to major in Psychology. She also notes that her son is a large reason for her to continue living and is a protective factor. Patient was provided cell phone to retrieve phone numbers of family members, she verbally gave permission for them to be called. She provided phone numbers for her sister Libby ([phone]), Father ([phone]), and fipatrice Dodge ([phone]).      Psychiatric Examination: observed phenomenon:  Vitals:  Vitals:    11/29/18 1214   BP: 115/75   Pulse: 86   Resp: 18   Temp: 37.2 °C (99 °F)   SpO2: 98%     Musculoskeletal no psychomotor agitation or retardation noted on exam   Appearance: 23 year old female appropriate to stated age in hospital attire, calm and cooperative with interview   Thoughts: linear, logical   Speech: regular volume, regular rate, no slurring of speech, no stuttering   Mood: \"depressed 7/10\"  Affect: restricted, depressed   SI/HI: denies/denies   Attention/Alertness: alert and attentive      Memory:in tact   Orientation: oriented to self, place, situation       Fund of Knowledge: in tact      Insight/Judgement into symptoms fair/fair       Lab results/tests:   All labs reviewed    Assessment:  Major Depressive Disorder with psychotic features  R/O " Bipolar II Disorder with psychotic features           Plan:  -Start Lithium 300mg daily for mood stabilization  -Patient requires further collateral from family for episodes of hypomania to ensure mood stabilizer should have continued use rather than antidepressants   -Patient will be transferred to [place] today     legal hold: on hold, extended

## 2018-11-29 NOTE — DISCHARGE PLANNING
Received Transport Form at 1300  Spoke to Libby at [facility]    Transport is scheduled for 11/29 at 1600 going to [place].    KEVON Mcnally notified.

## 2018-11-29 NOTE — PROGRESS NOTES
Patient's heart monitor displayed ST elevation.  Patient was asymptomatic.  Ordered stat EKG per protocol and paged [facility] to update and request lab orders for troponins and BNP.  EKG showed normal sinus rhythm.  Dr. Del Cid updated and declined to order labs at this time.

## 2018-11-29 NOTE — PROGRESS NOTES
Received report from previous shift. Plan of care discussed with patient. no concerns voiced at this time. Patient is A&O 4, bed alarm on.  Patient educated on the importance of calling if in need of assistance.  Patient verbalized understanding.  Bed controls on, bed locked and in lowest position, call light with patient. Patient without pain at this time. Will continue to monitor for safety and comfort.

## 2018-11-29 NOTE — PROGRESS NOTES
[facility] MEDICINE PROGRESS NOTE     Attending:   Mily Juan MD    Resident:   Nabil Cedillo DO, MPH    PATIENT:   Karlie Santos; [id number]; 1995    ID:   23 y.o. female with a PMHx of depression and anxiety admitted for suicide attempt with overdose of wellbutrin    SUBJECTIVE:   Pt thought to have ST elevations overnight. EKG performed and was reassuring. Pt in NSR. Pt denies chest pain/pressure, numbness/tingling, and shortness of breath. Pt reports that she slept well. Pt remains afebrile. Voiding and stooling. Awaiting for psychiatric facility to accept pt. No questions or concerns from the pt at this time.     OBJECTIVE:  Vitals:    11/28/18 1623 11/28/18 2038 11/28/18 2328 11/29/18 0347   BP: 101/67 (!) 97/64 (!) 94/60 (!) 98/58   Pulse: 75 74 77 74   Resp: 16 18 18 17   Temp: 36.3 °C (97.3 °F) 36.4 °C (97.6 °F) 36.4 °C (97.6 °F) 36.2 °C (97.1 °F)   TempSrc: Temporal Temporal Temporal Temporal   SpO2: 96% 97% 97% 95%   Weight:       Height:           Intake/Output Summary (Last 24 hours) at 11/29/18 0631  Last data filed at 11/28/18 1827   Gross per 24 hour   Intake              670 ml   Output              750 ml   Net              -80 ml       PHYSICAL EXAM:  General: No acute distress, afebrile, resting comfortably in bed  HEENT: NC/AT. EOMI. MMM, neck supple without adenopathy or mass  Cardiovascular: RRR, NMGR, cap refill brisk.  Respiratory: CTAB, no tachypnea or retractions  Abdomen: soft, NT/ND, no masses, +BS  EXT:  GUZMÁN, no edema, no erythema/lesion   Neuro: Non-focal    LABS:  Recent Labs      11/27/18   1815  11/27/18   2121  11/28/18   0948  11/28/18   2104   WBC  13.8*  11.2*  6.9  6.3   RBC  4.37  4.13*  4.11*  4.19*   HEMOGLOBIN  13.9  13.3  13.2  13.4   HEMATOCRIT  40.5  38.8  39.0  40.9   MCV  92.7  93.9  94.9  97.6   MCH  31.8  32.2  32.1  32.0   RDW  41.8  42.6  44.0  45.1   PLATELETCT  226  194  190  204   MPV  10.2  10.7  10.7  10.4   NEUTSPOLYS  83.10*   --    --    --     LYMPHOCYTES  11.60*   --    --    --    MONOCYTES  4.30   --    --    --    EOSINOPHILS  0.10   --    --    --    BASOPHILS  0.50   --    --    --      Recent Labs      18   0308   SODIUM  139  138   POTASSIUM  3.5*  4.0   CHLORIDE  108  108   CO2  22  22   BUN  13  7*   CREATININE  0.74  0.57   CALCIUM  9.9  8.7   MAGNESIUM  1.8  2.3   PHOSPHORUS  1.0*  4.1   ALBUMIN  4.8  4.4     Estimated GFR/CRCL = Estimated Creatinine Clearance: 168.1 mL/min (by C-G formula based on SCr of 0.57 mg/dL).  Recent Labs      18   0308   GLUCOSE  108*  99     Recent Labs      18   0308   ASTSGOT  11*  13   ALTSGPT  13  10   TBILIRUBIN  0.4  0.6   ALKPHOSPHAT  83  70   GLOBULIN  2.5  1.9             No results for input(s): INR, APTT, FIBRINOGEN in the last 72 hours.    Invalid input(s): DIMER      IMAGING:  EKG   Result Value Ref Range    Report       [facility]    Test Date:  2018  Pt Name:    RADHA ETIENNE                  Department: 183  MRN:        [id number]                      Room:       T805  Gender:     Female                       Technician: ADRIA  :        1995                   Requested By:JUN ARIAS  Order #:    [id number]                    Reading MD:    Measurements  Intervals                                Axis  Rate:       71                           P:          27  ID:         [id number]                          QRS:        56  QRSD:       74                           T:          38  QT:         360  QTc:        392    Interpretive Statements  SINUS RHYTHM  Compared to ECG 2018 08:59:25  No significant changes         MEDS:  Current Facility-Administered Medications   Medication Last Dose   • Respiratory Care per Protocol     • heparin injection 5,000 Units 5,000 Units at 18 0600   • labetalol (NORMODYNE,TRANDATE) injection 10 mg     • nicotine (NICODERM) 14 MG/24HR 14 mg 14 mg at 18 0600    And   • nicotine  polacrilex (NICORETTE) 2 MG piece 2 mg     • acetaminophen (TYLENOL) tablet 650 mg 650 mg at 11/28/18 2227   • LORazepam (ATIVAN) injection 4 mg         ASSESSMENT/PLAN:  23 y.o. female with a PMHx of depression and anxiety admitted for suicide attempt with overdose of wellbutrin     #Suicide Attempt  #Wellbutrin overdose  #Depression  #Seizure  #Tachycardia  - overdose around 12PM with approximately 20 wellbutrin tabs, dose unknown  - denies any other medicaitons, drugs, or alcohol  - Second suicide attempt, first at age 18, patient cut her wrists  - Patient reports feeling useless, lonely, and sad  - Difficulty sleeping, apathetic; patient reports good appetite  - Ran out of Prozac 40mg about 1 week ago -> filled per PCP  - Has never seen counselor- will need to be seen as outpatient  - Had seizure about 3 hours after ingesting pills- > seizure precautions w/ ativan PRN  - Most recent QTc 423  - received 4 mg Zofran, 2 mg Versed, and 1 L of IV fluids en route to the ED  - UDS: + Benzos (given versed per EMS) otherwise negative  - Negative for alcohol/Tylenol/Salicylates  - Poison control called by EMS, case number [id number]  - Poison Control recommendations              - 24 hours observation period              - monitor for prolonged QTc, if >500, give 1-2g Mag              - monitor QRS, if >120msec 1-2 amps sodium bicarb push              - maximize electrolytes              - treat hyperthermia with physical cooling measures, not tylenol              - tachycardia should resolve spontaneously, do not need to medicate              - monitor for hypotension, should respond to IVFs  - patient on legal hold  - Psych evaluated 11/28, appreciate recs   - Possible addition of Lithium to MAR, pending collateral info  - Documents for transfer to psychiatric facility signed    Plan:  - Suicide precautions  - 1:1 sitter  - Transfer to psychiatric facility  - F/u SW       #Hypophosphemia, Resolved  - Initial Phos 1, now  4.1   - received 30mmol in house    Plan:  - CTM sxs       #Hypomagnesemia, Resolved  - Received 2gm en route to hospital and another in house  - 1.8 -> 2.3    Plan: CTM sxs       Core Measures:  Lines: None  Govea: None  Abx: None  DVT ppx: SQH  GI ppx: none  Diet: Regular  PCP: Previously Dr. Ku, now [facility]  Code status: Full Code       Dispo: Admit on legal hold pending placement in psychiatric facility.      Nabil Cedillo DO, MPH (PGY-2)

## 2018-11-29 NOTE — DISCHARGE PLANNING
Anticipated Discharge Disposition: D/C to Psych Facility    Action: LSW informed by ELVER Parker that Dr. Donnelly at [facility] will accept pt. LSW completed transport and PCS forms, which were sent to ELVER Parker for set-up. [facility] PERRIN made aware, he will enter d/c summary and order.     Barriers to Discharge: None.    Plan: LSW to complete transfer packet within 2 hours of pt's transport time.

## 2018-11-29 NOTE — DISCHARGE PLANNING
Spoke To: [facility] [place]  Agency/Facility Name: [facility]  Plan or Request: Patient accepted, can be transported today.

## 2018-11-29 NOTE — DISCHARGE INSTRUCTIONS
Discharge Instructions    Discharged to facility by medicaltransport with escort. Discharged via wheelchair, hospital escort: Yes.  Special equipment needed: Not Applicable    Be sure to schedule a follow-up appointment with your primary care doctor or any specialists as instructed.     Discharge Plan:   Diet Plan: Discussed  Activity Level: Discussed  Smoking Cessation Offered: Patient Refused  Confirmed Follow up Appointment: Patient to Call and Schedule Appointment  Confirmed Symptoms Management: Discussed  Medication Reconciliation Updated: Yes  Influenza Vaccine Indication: Not indicated: Previously immunized this influenza season and > 8 years of age  Influenza Vaccine Given - only chart on this line when given: Influenza Vaccine Given (See MAR)    I understand that a diet low in cholesterol, fat, and sodium is recommended for good health. Unless I have been given specific instructions below for another diet, I accept this instruction as my diet prescription.   Other diet: regular    Special Instructions: None    · Is patient discharged on Warfarin / Coumadin?   No     Depression / Suicide Risk    As you are discharged from this [facility] facility, it is important to learn how to keep safe from harming yourself.    Recognize the warning signs:  · Abrupt changes in personality, positive or negative- including increase in energy   · Giving away possessions  · Change in eating patterns- significant weight changes-  positive or negative  · Change in sleeping patterns- unable to sleep or sleeping all the time   · Unwillingness or inability to communicate  · Depression  · Unusual sadness, discouragement and loneliness  · Talk of wanting to die  · Neglect of personal appearance   · Rebelliousness- reckless behavior  · Withdrawal from people/activities they love  · Confusion- inability to concentrate     If you or a loved one observes any of these behaviors or has concerns about self-harm, here's what you can  do:  · Talk about it- your feelings and reasons for harming yourself  · Remove any means that you might use to hurt yourself (examples: pills, rope, extension cords, firearm)  · Get professional help from the community (Mental Health, Substance Abuse, psychological counseling)  · Do not be alone:Call your Safe Contact- someone whom you trust who will be there for you.  · Call your local CRISIS HOTLINE 290-6949 or 779-568-9423  · Call your local Children's Mobile Crisis Response Team [place] (146) 743-4923 or wwwHelpMeNow  · Call the toll free National Suicide Prevention Hotlines   · National Suicide Prevention Lifeline 321-753-GRQR (5286)  · National Hope Line Network 800-SUICIDE (040-6867)    Lithium extended-release tablets  What is this medicine?  LITHIUM (LITH ee um) is used to prevent and treat the manic episodes caused by manic-depressive illness.  This medicine may be used for other purposes; ask your health care provider or pharmacist if you have questions.  COMMON BRAND NAME(S): Eskalith CR, Lithobid  What should I tell my health care provider before I take this medicine?  They need to know if you have any of these conditions:  -active infection  -Brugada Syndrome  -dehydration (diarrhea or sweating)  -diet low in salt  -heart disease  -high levels of calcium in the blood  -history of irregular heartbeat  -kidney disease  -low level of potassium or sodium in the blood  -parathyroid disease  -problems urinating  -thyroid disease  -an unusual or allergic reaction to lithium, other medicines, foods, dyes, or preservatives  -pregnant or trying to get pregnant  -breast-feeding  How should I use this medicine?  Take this medicine by mouth with a glass of water. Follow the directions on the prescription label. Swallow the tablets whole. Do not break, crush or chew. Take after a meal or snack to avoid stomach upset. Take your doses at regular intervals. Do not take your medicine more often than  directed. The amount of this medicine you take is very important. Taking more than the prescribed dose can cause serious side effects. Do not stop taking except on your the advice of your doctor or health care professional.  Talk to your pediatrician regarding the use of this medicine in children. Special care may be needed. While this drug may be prescribed for children as young as 12 years for selected conditions, precautions do apply.  Overdosage: If you think you have taken too much of this medicine contact a poison control center or emergency room at once.  NOTE: This medicine is only for you. Do not share this medicine with others.  What if I miss a dose?  If you miss a dose, take it as soon as you can. If it is almost time for your next dose, take only that dose. Do not take double or extra doses.  What may interact with this medicine?  Do not take this medicine with any of the following medications:  -cisapride  -dofetilide  -dronedarone  -pimozide  -stimulant medicines used to treat ADHD or narcolepsy  -thioridazine  -ziprasidone  This medicine may also interact with the following medications:  -buspirone  -caffeine  -calcium iodide  -carbamazepine  -certain medicines for depression, anxiety, or psychotic disturbances  -certain medicines for migraine headache like almotriptan, eletriptan, frovatriptan, naratriptan, rizatriptan, sumatriptan, zolmitriptan  -diuretics  -fentanyl  -linezolid  -MAOIs like Carbex, Eldepryl, Marplan, Nardil, and Parnate  -medicines for high blood pressure  -medicines that relax muscles for surgery  -metronidazole  -NSAIDs, medicines for pain and inflammation, like ibuprofen or naproxen  -other medicines that prolong the QT interval (cause an abnormal heart rhythm)  -phenytoin  -potassium iodide, KI  -sodium bicarbonate  -sodium chloride  -Marco's Wort  -tramadol  -tryptophan  -urea  This list may not describe all possible interactions. Give your health care provider a list of  all the medicines, herbs, non-prescription drugs, or dietary supplements you use. Also tell them if you smoke, drink alcohol, or use illegal drugs. Some items may interact with your medicine.  What should I watch for while using this medicine?  Visit your doctor or health care professional for regular checks on your progress. It can take several weeks of treatment before you start to get better.  The amount of salt (sodium) in your body influences the effects of this medicine, and this medicine can increase salt loss from the body. Eat a normal diet that includes salt. Do not change to salt substitutes. Avoid changes involving diet, or medications that include large amounts of sodium like sodium bicarbonate. Ask your doctor or health care professional for advice if you are not sure.  Drink plenty of fluids while you are taking this medicine. Avoid drinks that contain caffeine, such as coffee, tea and bari. You will need extra fluids if you have diarrhea or sweat a lot. This will help prevent toxic effects from this medicine. Be careful not to get overheated during exercise, saunas, hot baths, and hot weather. Consult your doctor or health care professional if you have a high fever or persistent diarrhea.  You may get drowsy or dizzy. Do not drive, use machinery, or do anything that needs mental alertness until you know how this medicine affects you. Do not stand or sit up quickly, especially if you are an older patient. This reduces the risk of dizzy or fainting spells.  What side effects may I notice from receiving this medicine?  Side effects that you should report to your doctor or health care professional as soon as possible:  -allergic reactions like skin rash, itching or hives, swelling of the face, lips, or tongue  -blurred vision  -breathing problems  -clumsiness or loss of balance  -confusion  -difficulty speaking or swallowing  -dizziness  -feeling faint or lightheaded, falls  -increased  thirst  -increased urination  -loss of appetite  -muscle weakness  -nausea, vomiting  -pain, coldness, or blue coloration of fingers or toes  -sensitivity to cold  -seizures  -slow, fast, or irregular heartbeat (palpitations)  -slurred speech  -swelling in the neck  -unusually weak or tired  Side effects that usually do not require medical attention (report to your doctor or health care professional if they continue or are bothersome):  -acne  -diarrhea  -mild tremor  -stomach pain  -weight gain  This list may not describe all possible side effects. Call your doctor for medical advice about side effects. You may report side effects to FDA at 1-534-FDA-7117.  Where should I keep my medicine?  Keep out of the reach of children.  Store at room temperature between 15 and 30 degrees C (59 and 86 degrees F). Keep container tightly closed. Protect from light. Throw away any unused medicine after the expiration date.  NOTE: This sheet is a summary. It may not cover all possible information. If you have questions about this medicine, talk to your doctor, pharmacist, or health care provider.  © 2018 Elsevier/Gold Standard (2016-12-14 17:25:37)

## 2018-11-29 NOTE — DISCHARGE PLANNING
Anticipated Discharge Disposition: D/C to Psych Facility    Action: LSW completed transfer packet for [facility] with original legal hold placed inside. Packet placed on pt's chart. Pt will be taken to [facility] via [facility] @ 1600. RN aware.     Barriers to Discharge: None.    Plan: No further d/c planning needs at this time.

## 2018-11-29 NOTE — DISCHARGE PLANNING
Agency/Facility Name: [facility]  Spoke To: Mat[place]  Outcome: Pt has been accepted by Dr. Donnelly. Requesting to have pt transferred at anytime. Contacted KEVON Mcnally and left vm. Contacted ELVER Parker and relayed information.

## 2018-11-30 NOTE — PROGRESS NOTES
Patient discharged from hospital. Tele monitor and IV removed. Discussed prescriptions, discharge education, symptoms management with patient. Patient transported to [facility] via [facility]. Patient a&ox4

## 2023-02-17 ENCOUNTER — APPOINTMENT (OUTPATIENT)
Dept: URGENT CARE | Facility: PHYSICIAN GROUP | Age: 28
End: 2023-02-17
Payer: MEDICAID

## 2023-08-17 ENCOUNTER — OFFICE VISIT (OUTPATIENT)
Dept: URGENT CARE | Facility: PHYSICIAN GROUP | Age: 28
End: 2023-08-17
Payer: MEDICAID

## 2023-08-17 VITALS
HEIGHT: 67 IN | SYSTOLIC BLOOD PRESSURE: 100 MMHG | OXYGEN SATURATION: 99 % | BODY MASS INDEX: 23.04 KG/M2 | DIASTOLIC BLOOD PRESSURE: 70 MMHG | WEIGHT: 146.8 LBS | TEMPERATURE: 101.3 F | RESPIRATION RATE: 20 BRPM | HEART RATE: 126 BPM

## 2023-08-17 DIAGNOSIS — R50.9 FEVER, UNSPECIFIED FEVER CAUSE: ICD-10-CM

## 2023-08-17 DIAGNOSIS — R22.1 MASS OF LEFT SIDE OF NECK: ICD-10-CM

## 2023-08-17 DIAGNOSIS — H92.02 LEFT EAR PAIN: ICD-10-CM

## 2023-08-17 PROCEDURE — 3078F DIAST BP <80 MM HG: CPT | Performed by: PHYSICIAN ASSISTANT

## 2023-08-17 PROCEDURE — 1125F AMNT PAIN NOTED PAIN PRSNT: CPT | Performed by: PHYSICIAN ASSISTANT

## 2023-08-17 PROCEDURE — 3074F SYST BP LT 130 MM HG: CPT | Performed by: PHYSICIAN ASSISTANT

## 2023-08-17 PROCEDURE — 99205 OFFICE O/P NEW HI 60 MIN: CPT | Performed by: PHYSICIAN ASSISTANT

## 2023-08-17 RX ORDER — DEXAMETHASONE SODIUM PHOSPHATE 10 MG/ML
10 INJECTION INTRAMUSCULAR; INTRAVENOUS ONCE
Status: COMPLETED | OUTPATIENT
Start: 2023-08-17 | End: 2023-08-17

## 2023-08-17 RX ADMIN — DEXAMETHASONE SODIUM PHOSPHATE 10 MG: 10 INJECTION INTRAMUSCULAR; INTRAVENOUS at 17:28

## 2023-08-17 ASSESSMENT — ENCOUNTER SYMPTOMS
COUGH: 0
SINUS PAIN: 0
VOMITING: 0
CHILLS: 1
FEVER: 1
NAUSEA: 0
NECK PAIN: 1
ABDOMINAL PAIN: 0
SORE THROAT: 0
DIZZINESS: 0
HEADACHES: 0

## 2023-08-17 ASSESSMENT — PAIN SCALES - GENERAL: PAINLEVEL: 10=SEVERE PAIN

## 2023-08-18 ENCOUNTER — HOSPITAL ENCOUNTER (INPATIENT)
Facility: MEDICAL CENTER | Age: 28
LOS: 7 days | DRG: 854 | End: 2023-08-25
Attending: STUDENT IN AN ORGANIZED HEALTH CARE EDUCATION/TRAINING PROGRAM | Admitting: STUDENT IN AN ORGANIZED HEALTH CARE EDUCATION/TRAINING PROGRAM
Payer: MEDICAID

## 2023-08-18 ENCOUNTER — HOSPITAL ENCOUNTER (OUTPATIENT)
Dept: RADIOLOGY | Facility: MEDICAL CENTER | Age: 28
End: 2023-08-18

## 2023-08-18 DIAGNOSIS — J39.0 RETROPHARYNGEAL ABSCESS: ICD-10-CM

## 2023-08-18 PROBLEM — A41.9 SEPSIS (HCC): Status: ACTIVE | Noted: 2023-08-18

## 2023-08-18 PROBLEM — F17.200 TOBACCO DEPENDENCE: Status: ACTIVE | Noted: 2023-08-18

## 2023-08-18 PROBLEM — E87.21 ACUTE LACTIC ACIDOSIS: Status: ACTIVE | Noted: 2023-08-18

## 2023-08-18 PROBLEM — R59.1 LYMPHADENOPATHY: Status: ACTIVE | Noted: 2023-08-18

## 2023-08-18 PROBLEM — L03.221 CELLULITIS, NECK: Status: ACTIVE | Noted: 2023-08-18

## 2023-08-18 PROBLEM — F15.10 METHAMPHETAMINE ABUSE (HCC): Status: ACTIVE | Noted: 2023-08-18

## 2023-08-18 LAB
ABO GROUP BLD: NORMAL
ANION GAP SERPL CALC-SCNC: 15 MMOL/L (ref 7–16)
APPEARANCE UR: CLEAR
BACTERIA #/AREA URNS HPF: ABNORMAL /HPF
BASOPHILS # BLD AUTO: 0.3 % (ref 0–1.8)
BASOPHILS # BLD: 0.08 K/UL (ref 0–0.12)
BILIRUB UR QL STRIP.AUTO: NEGATIVE
BLD GP AB SCN SERPL QL: NORMAL
BUN SERPL-MCNC: 8 MG/DL (ref 8–22)
CALCIUM SERPL-MCNC: 9.2 MG/DL (ref 8.5–10.5)
CHLORIDE SERPL-SCNC: 99 MMOL/L (ref 96–112)
CO2 SERPL-SCNC: 22 MMOL/L (ref 20–33)
COLOR UR: YELLOW
CREAT SERPL-MCNC: 0.6 MG/DL (ref 0.5–1.4)
EOSINOPHIL # BLD AUTO: 0 K/UL (ref 0–0.51)
EOSINOPHIL NFR BLD: 0 % (ref 0–6.9)
EPI CELLS #/AREA URNS HPF: ABNORMAL /HPF
ERYTHROCYTE [DISTWIDTH] IN BLOOD BY AUTOMATED COUNT: 45.1 FL (ref 35.9–50)
GFR SERPLBLD CREATININE-BSD FMLA CKD-EPI: 127 ML/MIN/1.73 M 2
GLUCOSE SERPL-MCNC: 110 MG/DL (ref 65–99)
GLUCOSE UR STRIP.AUTO-MCNC: 250 MG/DL
HCT VFR BLD AUTO: 37.8 % (ref 37–47)
HGB BLD-MCNC: 12.3 G/DL (ref 12–16)
HYALINE CASTS #/AREA URNS LPF: ABNORMAL /LPF
IMM GRANULOCYTES # BLD AUTO: 0.41 K/UL (ref 0–0.11)
IMM GRANULOCYTES NFR BLD AUTO: 1.3 % (ref 0–0.9)
INR PPP: 1.24 (ref 0.87–1.13)
KETONES UR STRIP.AUTO-MCNC: NEGATIVE MG/DL
LACTATE SERPL-SCNC: 0.8 MMOL/L (ref 0.5–2)
LACTATE SERPL-SCNC: 1.6 MMOL/L (ref 0.5–2)
LACTATE SERPL-SCNC: 4.5 MMOL/L (ref 0.5–2)
LEUKOCYTE ESTERASE UR QL STRIP.AUTO: NEGATIVE
LYMPHOCYTES # BLD AUTO: 1.26 K/UL (ref 1–4.8)
LYMPHOCYTES NFR BLD: 4.1 % (ref 22–41)
MAGNESIUM SERPL-MCNC: 1.8 MG/DL (ref 1.5–2.5)
MCH RBC QN AUTO: 30.9 PG (ref 27–33)
MCHC RBC AUTO-ENTMCNC: 32.5 G/DL (ref 32.2–35.5)
MCV RBC AUTO: 95 FL (ref 81.4–97.8)
MICRO URNS: ABNORMAL
MONOCYTES # BLD AUTO: 1.19 K/UL (ref 0–0.85)
MONOCYTES NFR BLD AUTO: 3.8 % (ref 0–13.4)
NEUTROPHILS # BLD AUTO: 28.13 K/UL (ref 1.82–7.42)
NEUTROPHILS NFR BLD: 90.5 % (ref 44–72)
NITRITE UR QL STRIP.AUTO: NEGATIVE
NRBC # BLD AUTO: 0 K/UL
NRBC BLD-RTO: 0 /100 WBC (ref 0–0.2)
PH UR STRIP.AUTO: 7.5 [PH] (ref 5–8)
PHOSPHATE SERPL-MCNC: 1.9 MG/DL (ref 2.5–4.5)
PLATELET # BLD AUTO: 279 K/UL (ref 164–446)
PMV BLD AUTO: 11.2 FL (ref 9–12.9)
POTASSIUM SERPL-SCNC: 3.7 MMOL/L (ref 3.6–5.5)
PROT UR QL STRIP: 30 MG/DL
PROTHROMBIN TIME: 15.4 SEC (ref 12–14.6)
RBC # BLD AUTO: 3.98 M/UL (ref 4.2–5.4)
RBC # URNS HPF: ABNORMAL /HPF
RBC UR QL AUTO: NEGATIVE
RH BLD: NORMAL
SCCMEC + MECA PNL NOSE NAA+PROBE: NEGATIVE
SODIUM SERPL-SCNC: 136 MMOL/L (ref 135–145)
SP GR UR STRIP.AUTO: 1.03
UROBILINOGEN UR STRIP.AUTO-MCNC: 1 MG/DL
WBC # BLD AUTO: 31.1 K/UL (ref 4.8–10.8)
WBC #/AREA URNS HPF: ABNORMAL /HPF

## 2023-08-18 PROCEDURE — 86900 BLOOD TYPING SEROLOGIC ABO: CPT

## 2023-08-18 PROCEDURE — 36415 COLL VENOUS BLD VENIPUNCTURE: CPT

## 2023-08-18 PROCEDURE — 700105 HCHG RX REV CODE 258: Mod: JZ | Performed by: HOSPITALIST

## 2023-08-18 PROCEDURE — 700105 HCHG RX REV CODE 258: Performed by: STUDENT IN AN ORGANIZED HEALTH CARE EDUCATION/TRAINING PROGRAM

## 2023-08-18 PROCEDURE — A9270 NON-COVERED ITEM OR SERVICE: HCPCS | Performed by: STUDENT IN AN ORGANIZED HEALTH CARE EDUCATION/TRAINING PROGRAM

## 2023-08-18 PROCEDURE — 87641 MR-STAPH DNA AMP PROBE: CPT

## 2023-08-18 PROCEDURE — 84100 ASSAY OF PHOSPHORUS: CPT

## 2023-08-18 PROCEDURE — 85025 COMPLETE CBC W/AUTO DIFF WBC: CPT

## 2023-08-18 PROCEDURE — 700102 HCHG RX REV CODE 250 W/ 637 OVERRIDE(OP): Performed by: HOSPITALIST

## 2023-08-18 PROCEDURE — 83735 ASSAY OF MAGNESIUM: CPT

## 2023-08-18 PROCEDURE — 87040 BLOOD CULTURE FOR BACTERIA: CPT

## 2023-08-18 PROCEDURE — 700111 HCHG RX REV CODE 636 W/ 250 OVERRIDE (IP): Performed by: HOSPITALIST

## 2023-08-18 PROCEDURE — 86901 BLOOD TYPING SEROLOGIC RH(D): CPT

## 2023-08-18 PROCEDURE — 770020 HCHG ROOM/CARE - TELE (206)

## 2023-08-18 PROCEDURE — 83605 ASSAY OF LACTIC ACID: CPT | Mod: 91

## 2023-08-18 PROCEDURE — A9270 NON-COVERED ITEM OR SERVICE: HCPCS | Performed by: HOSPITALIST

## 2023-08-18 PROCEDURE — 700111 HCHG RX REV CODE 636 W/ 250 OVERRIDE (IP): Performed by: STUDENT IN AN ORGANIZED HEALTH CARE EDUCATION/TRAINING PROGRAM

## 2023-08-18 PROCEDURE — 86850 RBC ANTIBODY SCREEN: CPT

## 2023-08-18 PROCEDURE — 99223 1ST HOSP IP/OBS HIGH 75: CPT | Performed by: STUDENT IN AN ORGANIZED HEALTH CARE EDUCATION/TRAINING PROGRAM

## 2023-08-18 PROCEDURE — 81001 URINALYSIS AUTO W/SCOPE: CPT

## 2023-08-18 PROCEDURE — 80048 BASIC METABOLIC PNL TOTAL CA: CPT

## 2023-08-18 PROCEDURE — 700102 HCHG RX REV CODE 250 W/ 637 OVERRIDE(OP): Performed by: STUDENT IN AN ORGANIZED HEALTH CARE EDUCATION/TRAINING PROGRAM

## 2023-08-18 PROCEDURE — 85610 PROTHROMBIN TIME: CPT

## 2023-08-18 PROCEDURE — 92610 EVALUATE SWALLOWING FUNCTION: CPT

## 2023-08-18 RX ORDER — POLYETHYLENE GLYCOL 3350 17 G/17G
1 POWDER, FOR SOLUTION ORAL
Status: DISCONTINUED | OUTPATIENT
Start: 2023-08-18 | End: 2023-08-21

## 2023-08-18 RX ORDER — SODIUM CHLORIDE 9 MG/ML
2000 INJECTION, SOLUTION INTRAVENOUS ONCE
Status: COMPLETED | OUTPATIENT
Start: 2023-08-18 | End: 2023-08-18

## 2023-08-18 RX ORDER — HYDROMORPHONE HYDROCHLORIDE 1 MG/ML
0.5 INJECTION, SOLUTION INTRAMUSCULAR; INTRAVENOUS; SUBCUTANEOUS EVERY 4 HOURS PRN
Status: DISCONTINUED | OUTPATIENT
Start: 2023-08-18 | End: 2023-08-19

## 2023-08-18 RX ORDER — AMOXICILLIN 250 MG
2 CAPSULE ORAL 2 TIMES DAILY
Status: DISCONTINUED | OUTPATIENT
Start: 2023-08-18 | End: 2023-08-21

## 2023-08-18 RX ORDER — KETOROLAC TROMETHAMINE 30 MG/ML
15 INJECTION, SOLUTION INTRAMUSCULAR; INTRAVENOUS ONCE
Status: COMPLETED | OUTPATIENT
Start: 2023-08-18 | End: 2023-08-18

## 2023-08-18 RX ORDER — BISACODYL 10 MG
10 SUPPOSITORY, RECTAL RECTAL
Status: DISCONTINUED | OUTPATIENT
Start: 2023-08-18 | End: 2023-08-21

## 2023-08-18 RX ORDER — SODIUM CHLORIDE, SODIUM LACTATE, POTASSIUM CHLORIDE, CALCIUM CHLORIDE 600; 310; 30; 20 MG/100ML; MG/100ML; MG/100ML; MG/100ML
INJECTION, SOLUTION INTRAVENOUS CONTINUOUS
Status: DISCONTINUED | OUTPATIENT
Start: 2023-08-18 | End: 2023-08-19

## 2023-08-18 RX ORDER — CYCLOBENZAPRINE HCL 10 MG
10 TABLET ORAL 3 TIMES DAILY PRN
Status: DISCONTINUED | OUTPATIENT
Start: 2023-08-18 | End: 2023-08-18

## 2023-08-18 RX ORDER — CYCLOBENZAPRINE HCL 10 MG
5 TABLET ORAL 3 TIMES DAILY PRN
Status: DISCONTINUED | OUTPATIENT
Start: 2023-08-18 | End: 2023-08-25 | Stop reason: HOSPADM

## 2023-08-18 RX ADMIN — CYCLOBENZAPRINE HYDROCHLORIDE 5 MG: 5 TABLET, FILM COATED ORAL at 23:12

## 2023-08-18 RX ADMIN — VANCOMYCIN HYDROCHLORIDE 1000 MG: 5 INJECTION, POWDER, LYOPHILIZED, FOR SOLUTION INTRAVENOUS at 12:40

## 2023-08-18 RX ADMIN — KETOROLAC TROMETHAMINE 15 MG: 30 INJECTION, SOLUTION INTRAMUSCULAR; INTRAVENOUS at 09:16

## 2023-08-18 RX ADMIN — HYDROMORPHONE HYDROCHLORIDE 0.5 MG: 1 INJECTION, SOLUTION INTRAMUSCULAR; INTRAVENOUS; SUBCUTANEOUS at 08:00

## 2023-08-18 RX ADMIN — HYDROMORPHONE HYDROCHLORIDE 0.5 MG: 1 INJECTION, SOLUTION INTRAMUSCULAR; INTRAVENOUS; SUBCUTANEOUS at 19:38

## 2023-08-18 RX ADMIN — AMPICILLIN AND SULBACTAM 3 G: 1; 2 INJECTION, POWDER, FOR SOLUTION INTRAMUSCULAR; INTRAVENOUS at 17:48

## 2023-08-18 RX ADMIN — SODIUM CHLORIDE 2000 ML: 9 INJECTION, SOLUTION INTRAVENOUS at 09:10

## 2023-08-18 RX ADMIN — AMPICILLIN AND SULBACTAM 3 G: 1; 2 INJECTION, POWDER, FOR SOLUTION INTRAMUSCULAR; INTRAVENOUS at 23:50

## 2023-08-18 RX ADMIN — VANCOMYCIN HYDROCHLORIDE 1750 MG: 5 INJECTION, POWDER, LYOPHILIZED, FOR SOLUTION INTRAVENOUS at 06:23

## 2023-08-18 RX ADMIN — SODIUM CHLORIDE, POTASSIUM CHLORIDE, SODIUM LACTATE AND CALCIUM CHLORIDE: 600; 310; 30; 20 INJECTION, SOLUTION INTRAVENOUS at 06:17

## 2023-08-18 RX ADMIN — AMPICILLIN AND SULBACTAM 3 G: 1; 2 INJECTION, POWDER, FOR SOLUTION INTRAMUSCULAR; INTRAVENOUS at 11:47

## 2023-08-18 RX ADMIN — SENNOSIDES AND DOCUSATE SODIUM 2 TABLET: 50; 8.6 TABLET ORAL at 18:46

## 2023-08-18 RX ADMIN — HYDROMORPHONE HYDROCHLORIDE 0.5 MG: 1 INJECTION, SOLUTION INTRAMUSCULAR; INTRAVENOUS; SUBCUTANEOUS at 23:44

## 2023-08-18 ASSESSMENT — LIFESTYLE VARIABLES
ALCOHOL_USE: YES
TOTAL SCORE: 0
DOES PATIENT WANT TO STOP DRINKING: NO
CONSUMPTION TOTAL: POSITIVE
EVER HAD A DRINK FIRST THING IN THE MORNING TO STEADY YOUR NERVES TO GET RID OF A HANGOVER: NO
EVER FELT BAD OR GUILTY ABOUT YOUR DRINKING: NO
TOTAL SCORE: 0
TOTAL SCORE: 0
HAVE YOU EVER FELT YOU SHOULD CUT DOWN ON YOUR DRINKING: NO
ON A TYPICAL DAY WHEN YOU DRINK ALCOHOL HOW MANY DRINKS DO YOU HAVE: 2
HOW MANY TIMES IN THE PAST YEAR HAVE YOU HAD 5 OR MORE DRINKS IN A DAY: 1
AVERAGE NUMBER OF DAYS PER WEEK YOU HAVE A DRINK CONTAINING ALCOHOL: 1
HAVE PEOPLE ANNOYED YOU BY CRITICIZING YOUR DRINKING: NO

## 2023-08-18 ASSESSMENT — COGNITIVE AND FUNCTIONAL STATUS - GENERAL
MOBILITY SCORE: 24
DAILY ACTIVITIY SCORE: 24
SUGGESTED CMS G CODE MODIFIER DAILY ACTIVITY: CH
SUGGESTED CMS G CODE MODIFIER MOBILITY: CH
SUGGESTED CMS G CODE MODIFIER MOBILITY: CH
MOBILITY SCORE: 24

## 2023-08-18 ASSESSMENT — ENCOUNTER SYMPTOMS
CHILLS: 1
BLURRED VISION: 0
NAUSEA: 0
MYALGIAS: 0
DEPRESSION: 0
FEVER: 1
COUGH: 0
HEARTBURN: 0
HEADACHES: 0
NECK PAIN: 1
BRUISES/BLEEDS EASILY: 0
PALPITATIONS: 0
DIZZINESS: 0
HEMOPTYSIS: 0
DOUBLE VISION: 0

## 2023-08-18 ASSESSMENT — PATIENT HEALTH QUESTIONNAIRE - PHQ9
SUM OF ALL RESPONSES TO PHQ9 QUESTIONS 1 AND 2: 0
SUM OF ALL RESPONSES TO PHQ9 QUESTIONS 1 AND 2: 0
2. FEELING DOWN, DEPRESSED, IRRITABLE, OR HOPELESS: NOT AT ALL
1. LITTLE INTEREST OR PLEASURE IN DOING THINGS: NOT AT ALL
1. LITTLE INTEREST OR PLEASURE IN DOING THINGS: NOT AT ALL
2. FEELING DOWN, DEPRESSED, IRRITABLE, OR HOPELESS: NOT AT ALL

## 2023-08-18 ASSESSMENT — PAIN DESCRIPTION - PAIN TYPE
TYPE: ACUTE PAIN

## 2023-08-18 NOTE — CONSULTS
"Otolaryngology Consult Note    CC: neck abscess    HPI: Karlie Santos is a 28 y.o. female transferred from [facility] with enlarging left neck mass over the past 4 days, but worse over the past 1 day. She has throat pain, and f/c. Trouble speaking because of pain. Neck pain is worse with mouth opening. Reports meth use yesterday, but no IVDA.     Past Medical History:   Diagnosis Date    Polysubstance abuse (HCC)     METH, Marijuana     Past Surgical History:   Procedure Laterality Date    PRIMARY C SECTION Bilateral 7/16/2016    Procedure: PRIMARY C SECTION;  Surgeon: Michael Underwood M.D.;  Location: LABOR AND DELIVERY;  Service:     OTHER ORTHOPEDIC SURGERY      surgery on R femur at 5yo     No current facility-administered medications on file prior to encounter.     No current outpatient medications on file prior to encounter.     No Known Allergies  Family and Occupational History     Socioeconomic History    Marital status:      Spouse name: Not on file    Number of children: Not on file    Years of education: Not on file    Highest education level: Not on file   Occupational History    Not on file       O:  BP 94/54   Pulse (!) 107   Temp (!) 38.1 °C (100.5 °F) (Oral)   Resp (!) 25   Ht 1.702 m (5' 7\")   Wt 67 kg (147 lb 11.3 oz)   SpO2 95%   Gen: appears in pain  Pulm: Breathing comfortably on RA without stridor or stertor  Face: symmetric, no edema, facial strength intact bilaterally  Eyes: conjunctiva clear, no chemosis. Vision grossly intact bilaterally  Ears: pinna normal. Hearing grossly intact  Nose: patent, with moist mucosa. no purulence or edema.   OC/OP: trismus to 2cm  Neck: swollen L neck with tenderness to palpation and induration. Minimal erythyema    Recent Labs     08/18/23  0542   WBC 31.1*   RBC 3.98*   HEMOGLOBIN 12.3   HEMATOCRIT 37.8   MCV 95.0   MCH 30.9   MCHC 32.5   RDW 45.1   PLATELETCT 279   MPV 11.2     Recent Labs     08/18/23  0542   SODIUM 136   POTASSIUM 3.7 " "  CHLORIDE 99   CO2 22   GLUCOSE 110*   BUN 8   CREATININE 0.60   CALCIUM 9.2     Recent Labs     08/18/23  0840   INR 1.24*     CT soft tissue neck from OSH 8/17showing:  \"compression of left jugular veins with extensive inflammatory changes involving the left neck, myositis of sternocleidomastoid, 23 x 15 mm left jugulodigastric lymphadenopathy with central hypoattenuation raising concern for necrotic lymphadenopathy.\"  - independent read with significant swelling but no surgically drainable abscess    A/P: Karlie Santos is a 28 y.o. 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 I recommend continued IV abx, but no surgical intervention unless she worsens. Ok to have a diet, but please make NPO after midnight in case she worsens.     Thank you for the consultation. Please call with questions or concerns.    Maverick Benson M.D.  [phone]   "

## 2023-08-18 NOTE — PROGRESS NOTES
Hospital Medicine Daily Progress Note    Date of Service  8/18/2023    Chief Complaint  Karlie Santos is a 28 y.o. female admitted 8/18/2023 with neck abscess    Hospital Course    Karlie Santos is a 28 y.o. female past medical history of methamphetamine, tobacco abuse who presented 8/18/2023 to [facility] with rapidly enlarging left neck mass that she noticed 4 days ago.  She reports associated fever, chills and sore throat.  She reports her neck pain is worse with opening jaw, movement of neck.  Does report using methamphetamine earlier today.  Denies any IV drugs.  CT soft tissue neck showing compression of left jugular veins with extensive inflammatory changes involving the left neck, myositis of sternocleidomastoid, 23 x 15 mm left jugulodigastric lymphadenopathy with central hypoattenuation raising concern for necrotic lymphadenopathy.  Patient may criteria for sepsis at outside facility given IV fluids and vancomycin and Rocephin transferred here for ENT consultation.  Patient currently is able to maintain airway, not requiring oxygen and is hemodynamically stable.    ENT evaluated and recommended continue IV antibiotics and n.p.o. from midnight just in case if symptoms get worse.    Interval Problem Update  08/18/23    Patient admitted by my colleague this morning.  I evaluated and examined her at the bedside  I discussed plan of care with her.  Reviewed ENT note.  Plan is to make her n.p.o. from midnight  I discussed plan of care with patient and answered all questions.    I have discussed this patient's plan of care and discharge plan at IDT rounds today with Case Management, Nursing, Nursing leadership, and other members of the IDT team.    Consultants/Specialty  ENT    Code Status  Full Code    Disposition  Medically Cleared  I have placed the appropriate orders for post-discharge needs.    Review of Systems  ROS     Physical Exam  Temp:  [36.1 °C (97 °F)-39.4 °C (103 °F)] 36.2 °C (97.1 °F)  Pulse:   [] 96  Resp:  [19-32] 24  BP: ()/(48-72) 108/56  SpO2:  [93 %-100 %] 96 %    Physical Exam    Fluids    Intake/Output Summary (Last 24 hours) at 8/18/2023 1642  Last data filed at 8/18/2023 1400  Gross per 24 hour   Intake 2570.93 ml   Output 200 ml   Net 2370.93 ml       Laboratory  Recent Labs     08/18/23  0542   WBC 31.1*   RBC 3.98*   HEMOGLOBIN 12.3   HEMATOCRIT 37.8   MCV 95.0   MCH 30.9   MCHC 32.5   RDW 45.1   PLATELETCT 279   MPV 11.2     Recent Labs     08/18/23  0542   SODIUM 136   POTASSIUM 3.7   CHLORIDE 99   CO2 22   GLUCOSE 110*   BUN 8   CREATININE 0.60   CALCIUM 9.2     Recent Labs     08/18/23  0840   INR 1.24*               Imaging  CT-FOREIGN FILM CAT SCAN   Final Result           Assessment/Plan  * Sepsis (HCC)- (present on admission)  Assessment & Plan  This is Sepsis Present on admission  SIRS criteria identified on my evaluation include: Fever, with temperature greater than 100.9 deg F and Tachycardia, with heart rate greater than 90 BPM  Clinical indicators of end organ dysfunction include Lactic Acid greater than 2  Source is necrotic lymphadenopathy, neck cellultis  Sepsis protocol initiated  Crystalloid Fluid Administration: Fluid resuscitation ordered per standard protocol - 30 mL/kg per current or ideal body weight  IV antibiotics as appropriate for source of sepsis  Reassessment: I have reassessed the patient's hemodynamic status    IV Fluids   Vancomycin per pharmacy   Check MRSA PCR  Unasyn 3 grams every 6 hours  Pain control  Low threshold to transfer to IMCU   Trend lactic acid    Cellulitis, neck  Assessment & Plan  Vancomycin/Unasyn   F/u cultures   ENT consulted -follow official recommendations     Acute lactic acidosis  Assessment & Plan  Trend   IV Fluids       Tobacco dependence  Assessment & Plan  Tobacco cessation education provided for more than 3 minutes, discussed options of nicotine patch, medical treatment with wellbutrin and chantix. Discussed the risks  of smoking including increased risk of heart disease, stroke, cancer and COPD      Lymphadenopathy  Assessment & Plan  CT at outside facility 23 x 15 mm left jugulodigastric lymphadenopathy with central hypoattenuation concerning for necrotic lymphadenopathy, multiple posterior cervical chain lymphadenopathy noted  Extensive inflammation is in the left neck which may be secondary to cellulitis, myositis sternocleidomastoid.    ENT consulted     Methamphetamine abuse (HCC)  Assessment & Plan  Encouraged sobriety        Holding pharmacological DVT prophylaxis as patient may need surgical intervention.    VTE prophylaxis:   SCDs/TEDs      I have performed a physical exam and reviewed and updated ROS and Plan today (8/18/2023). In review of yesterday's note (8/17/2023), there are no changes except as documented above.

## 2023-08-18 NOTE — ASSESSMENT & PLAN NOTE
This is Sepsis Present on admission  SIRS criteria identified on my evaluation include: Fever, with temperature greater than 100.9 deg F and Tachycardia, with heart rate greater than 90 BPM  Clinical indicators of end organ dysfunction include Lactic Acid greater than 2  Source is necrotic lymphadenopathy, neck cellultis  Sepsis protocol initiated  Crystalloid Fluid Administration: Fluid resuscitation ordered per standard protocol - 30 mL/kg per current or ideal body weight  IV antibiotics as appropriate for source of sepsis  Reassessment: I have reassessed the patient's hemodynamic status    Secondary to retropharyngeal abscess in context of active IV drug use.  MRSA nares came back negative  Continuing IV Unasyn.  Will likely transfer to oral Augmentin later in hospital course if continued clinical improvement  Follow culture results  Continue administration of pain control.

## 2023-08-18 NOTE — PROGRESS NOTES
Report received from JONO Pandey at [facility]. Per RN ambulance transport would have an ETA of 6 hours, so patient was given permission to drive to [facility] in private vehicle. I.V was removed, and packet sent with patient. Per primary RN patient left facility just a little after midnight.

## 2023-08-18 NOTE — PROGRESS NOTES
Pharmacy Vancomycin Kinetics Note for 8/18/2023     28 y.o. female on Vancomycin Day # 1 (At [facility], initiated at OSH)     Vancomycin Indication (AUC Dosing): Skin/skin structure infection (possible myositis)    Provider specified end date:  (TBD)    Active Antibiotics (From admission, onward)      Ordered     Ordering Provider       Fri Aug 18, 2023  5:39 AM    08/18/23 0539  vancomycin (Vancocin) 1,750 mg in  mL IVPB  (vancomycin (VANCOCIN) IV (LD + Maintenance))  ONCE        Note to Pharmacy: Per P&T Vanco per pharmacy Protocol    Jonathan Garcia M.D.       Fri Aug 18, 2023  5:12 AM    08/18/23 0512  ampicillin/sulbactam (Unasyn) 3 g in  mL IVPB  EVERY 6 HOURS         Jonathan Garcia M.D.    08/18/23 0512  MD Alert...Vancomycin per Pharmacy  PHARMACY TO DOSE        Question:  Indication(s) for vancomycin?  Answer:  Skin and soft tissue infection    Jonathan Garcia M.D.          Dosing Weight: 67.6 kg (149 lb 0.5 oz)    Admission History: Admitted on 8/18/2023 for Sepsis (HCC) [A41.9]  Pertinent history: Transfer from [facility] for sepsis with enlarging neck mass.    Allergies: Patient has no known allergies.     Pertinent cultures to date:   Results       Procedure Component Value Units Date/Time    Blood Culture [[id number]]     Order Status: No result Specimen: Blood from Peripheral     Blood Culture [[id number]]     Order Status: No result Specimen: Blood from Peripheral     Urinalysis [[id number]]     Order Status: No result Specimen: Urine     MRSA By PCR (Amp) [[id number]]     Order Status: No result Specimen: Respirate from Nares             Labs: Estimated Creatinine Clearance: 138.2 mL/min (by C-G formula based on SCr of 0.6 mg/dL).    Recent Labs     08/18/23  0542   WBC 31.1*   NEUTSPOLYS 90.50*     Recent Labs     08/18/23  0542   BUN 8   CREATININE 0.60     No intake or output data in the 24 hours ending 08/18/23 0805   /58   Pulse (!) 124   Temp (!) 38.7 °C (101.7 °F) (Oral)   " Resp (!) 22   Ht 1.702 m (5' 7\")   Wt 67.6 kg (149 lb 0.5 oz)   SpO2 100%  Temp (24hrs), Av.7 °C (101.7 °F), Min:38.7 °C (101.7 °F), Max:38.7 °C (101.7 °F)    List concerns for Vancomycin clearance: Receipt of contrast dye;Hypermetabolic State (SIRS)    Pharmacokinetics:  AUC kinetics:   Ke (hr ^-1): 0.1191 hr^-1  Half life: 5.82 hr  Clearance: 5.233  Estimated TDD: 2616.5  Estimated Dose: 850  Estimated interval: 7.8    A/P:   -  Vancomycin dose: 1,000 mg (15 mg/kg) IV q8h  (0500, 1300, 2100)     -  Next vancomycin level(s):   23 at 1600, Vt 23 at 2030    -  Predicted vancomycin AUC from initial AUC test calculator: 573 mg·hr/L    Tanja Weiss, PharmD    "

## 2023-08-18 NOTE — DISCHARGE PLANNING
Case Management Discharge Planning    Admission Date: 8/18/2023  GMLOS:    ALOS: 0    6-Clicks ADL Score: 24  6-Clicks Mobility Score: 24  Therapy orders not indicated at this time    Anticipated Discharge Dispo: Discharge Disposition: Discharged to home/self care (01)  Per chart review pt resides in [place], [place]    Family support:  Nahed De La Torre Mother [phone]     Current Insurance on file: [employer]    DME Needed: pending hospital course    Action(s) Taken: chart reviewed    Escalations Completed: None    Medically Clear: No    Next Steps: f/u with pt and medical team to discuss dc needs and barriers.  Assessment to be completed to assess for HCM needs.    Barriers to Discharge: Medical clearance /Specialty Clearance    Is the patient up for discharge tomorrow: No

## 2023-08-18 NOTE — PROGRESS NOTES
Patient has not yet arrived to Unit. RN attempted to call patient cell phone but was unsuccessful. MD notified.

## 2023-08-18 NOTE — ASSESSMENT & PLAN NOTE
Tobacco cessation education provided for more than 3 minutes, discussed options of nicotine patch, medical treatment with wellbutrin and chantix. Discussed the risks of smoking including increased risk of heart disease, stroke, cancer and COPD

## 2023-08-18 NOTE — PROGRESS NOTES
Ok for IMCU for close monitoring in the setting of skin and soft tissue sepsis. Hospitalists will continue to manage; she is on broad spectrum antibiotics and ENT has been consulted. Critical care has not been consulted but we are available if needed.     Magen Maradiaga M.D.

## 2023-08-18 NOTE — PROGRESS NOTES
Subjective:     CHIEF COMPLAINT  Chief Complaint   Patient presents with    Neck Pain     X 4 days severe swelling on left side of neck. Along with pain. Can not turn neck     Otalgia     Left ear pain.        HPI  Karlie Santos is a very pleasant 28 y.o. female who presents to the clinic with left-sided neck mass x4 days.  Patient states the mass has greatly grown in size over the last 24 hours.  Describes severe pain rated as a 10/10.  Unable to move neck due to the pain and stiffness.  Has pain and difficulty opening her mouth.  Left-sided otalgia without discharge or drainage.  She has also been running a fever.  No sore throat.    REVIEW OF SYSTEMS  Review of Systems   Constitutional:  Positive for chills and fever. Negative for malaise/fatigue.   HENT:  Positive for ear pain. Negative for congestion, ear discharge, sinus pain and sore throat.    Respiratory:  Negative for cough.    Gastrointestinal:  Negative for abdominal pain, nausea and vomiting.   Musculoskeletal:  Positive for neck pain.   Skin:  Negative for rash.   Neurological:  Negative for dizziness and headaches.       PAST MEDICAL HISTORY  Patient Active Problem List    Diagnosis Date Noted    Lower urinary tract infectious disease 2016    Anemia affecting pregnancy 2016    Omphalocele, fetal, affecting care of mother, antepartum 2016    Pregnancy 2016    Homelessness 2016     labor 2016       SURGICAL HISTORY   has a past surgical history that includes other orthopedic surgery and primary c section (Bilateral, 2016).    ALLERGIES  No Known Allergies    CURRENT MEDICATIONS  Home Medications       Reviewed by Maverick Bates P.A.-C. (Physician Assistant) on 23 at 1707  Med List Status: <None>     Medication Last Dose Status        Patient Mario Taking any Medications                           SOCIAL HISTORY  Social History     Tobacco Use    Smoking status: Every Day     Packs/day: .5     Types:  "Cigarettes     Start date: 1/1/2008    Smokeless tobacco: Never   Substance and Sexual Activity    Alcohol use: Yes     Comment: Very occationally    Drug use: Yes     Types: Inhaled     Comment: Marijuana - Last used when found out she was pregnant    Sexual activity: Not on file       FAMILY HISTORY  History reviewed. No pertinent family history.       Objective:     VITAL SIGNS: /70   Pulse (!) 126   Temp (!) 38.5 °C (101.3 °F) (Temporal)   Resp 20   Ht 1.702 m (5' 7\")   Wt 66.6 kg (146 lb 12.8 oz)   SpO2 99%   BMI 22.99 kg/m²     PHYSICAL EXAM  Physical Exam  Constitutional:       Appearance: Normal appearance. She is ill-appearing.      Comments: Patient in obvious pain   HENT:      Head: Normocephalic and atraumatic.      Right Ear: Ear canal normal. No mastoid tenderness. Tympanic membrane is not erythematous or bulging.      Left Ear: Ear canal normal. No mastoid tenderness. Tympanic membrane is erythematous. Tympanic membrane is not bulging.      Mouth/Throat:      Mouth: Mucous membranes are moist.      Pharynx: Posterior oropharyngeal erythema present. No oropharyngeal exudate.   Eyes:      Conjunctiva/sclera: Conjunctivae normal.   Neck:        Comments: Patient has significant noticeable swelling to the left cervical region.  Swelling extends from inferior auricular area to the mid cervical region.  No overlying redness present.  The area is diffusely tender to palpation.  Cervical range of motion severely limited due to pain.  Difficulty with opening and closing jaw.  I am not able to appreciate any significant area of fluctuance however difficult to determine due to the patient's pain level.  Cardiovascular:      Rate and Rhythm: Tachycardia present.      Pulses: Normal pulses.      Heart sounds: Normal heart sounds.   Pulmonary:      Effort: Pulmonary effort is normal.      Breath sounds: Normal breath sounds. No wheezing.   Musculoskeletal:      Cervical back: Rigidity and tenderness " present. Decreased range of motion.   Neurological:      General: No focal deficit present.      Mental Status: She is alert and oriented to person, place, and time. Mental status is at baseline.         Assessment/Plan:     1. Mass of left side of neck  dexamethasone (Decadron) injection (check route below) 10 mg    cefTRIAXone (Rocephin) 500 mg in lidocaine (Xylocaine) 1 % 2 mL for IM use      2. Fever, unspecified fever cause  cefTRIAXone (Rocephin) 500 mg in lidocaine (Xylocaine) 1 % 2 mL for IM use      3. Left ear pain  cefTRIAXone (Rocephin) 500 mg in lidocaine (Xylocaine) 1 % 2 mL for IM use          MDM/Comments:    This is a very pleasant 28-year-old female presented to the clinic with progressively worsening left-sided neck pain and swelling x4 days.  Pain is greatly increased over the last 24 hours.  Currently rated as a 10/10.  On exam patient has a large mass present to the left cervical region overlying the sternocleidomastoid.  This area is diffusely tender to palpation.  Range of motion severely limited due to pain and stiffness.  Difficulty with opening and closing the jaw.  On exam no overlying skin changes noted.  No palpable areas of fluctuance however difficulty to determine due to the patient's pain.  At this time I have concern for potential soft tissue infection/abscess and believe imaging needs to be performed.  Unfortunately we do not have ultrasound available at the location today.  Patient is going to present via personal vehicle as she has a  to the ED for further management.  10 mg IM Decadron provided in clinic to assist with swelling.  500 mg IM Rocephin also provided.    Differential diagnosis, natural history, supportive care, and indications for immediate follow-up discussed. All questions answered. Patient agrees with the plan of care.    Follow-up as needed if symptoms worsen or fail to improve to PCP, Urgent care or Emergency Room.    I have personally reviewed prior  external notes and test results pertinent to today's visit.  I have independently reviewed and interpreted all diagnostics ordered during this urgent care acute visit.   Discussed management options (risks,benefits, and alternatives to treatment). Pt expresses understanding and the treatment plan was agreed upon. Questions were encouraged and answered to pt's satisfaction.    Please note that this dictation was created using voice recognition software. I have made a reasonable attempt to correct obvious errors, but I expect that there are errors of grammar and possibly content that I did not discover before finalizing the note.

## 2023-08-18 NOTE — ASSESSMENT & PLAN NOTE
CT at outside facility 23 x 15 mm left jugulodigastric lymphadenopathy with central hypoattenuation concerning for necrotic lymphadenopathy, multiple posterior cervical chain lymphadenopathy noted.   Status post drainage of retropharyngeal abscess   ENT following  CT (8/22/23): No significant interval change in size of fluid collection.   Suspect imaging may be mistaking lymph node for fluid collection.  Will start Decadron 6 mg for possible inflamed lymph node.  As this is a high dose of steroids, will obtain q6h fingersticks to monitor glucose.  Glucose after starting Decadron have been 164 and 185.   -Continue Decadron 6 mg q6h   -Fingerstick glucose checks

## 2023-08-18 NOTE — CARE PLAN
The patient is Stable - Low risk of patient condition declining or worsening    Shift Goals  Clinical Goals: ABX therapy  Patient Goals: Pain control, rest    Progress made toward(s) clinical / shift goals:    Problem: Knowledge Deficit - Standard  Goal: Patient and family/care givers will demonstrate understanding of plan of care, disease process/condition, diagnostic tests and medications  Outcome: Progressing     Problem: Pain - Standard  Goal: Alleviation of pain or a reduction in pain to the patient’s comfort goal  Outcome: Progressing     Problem: Hemodynamics  Goal: Patient's hemodynamics, fluid balance and neurologic status will be stable or improve  Outcome: Progressing     Problem: Fluid Volume  Goal: Fluid volume balance will be maintained  Outcome: Progressing       Patient is not progressing towards the following goals:

## 2023-08-18 NOTE — H&P
Hospital Medicine History & Physical Note    Date of Service  8/18/2023    Primary Care Physician  Naveed Ku M.D. (Inactive)    Consultants  ENT - Dr. Loja  Critical Care - for [facility] Placement    Code Status  Full Code    Chief Complaint  -Sepsis   -Left sided neck 23 x 15 mm left jugulodigastric lymphadenopathy with central hypo-attenuation raising concern for necrotic lymphadenopathy      History of Presenting Illness  Karlie Santos is a 28 y.o. female past medical history of methamphetamine, tobacco abuse who presented 8/18/2023 to [facility] with rapidly enlarging left neck mass that she noticed 4 days ago.  She reports associated fever, chills and sore throat.  She reports her neck pain is worse with opening jaw, movement of neck.  Does report using methamphetamine earlier today.  Denies any IV drugs.  CT soft tissue neck showing compression of left jugular veins with extensive inflammatory changes involving the left neck, myositis of sternocleidomastoid, 23 x 15 mm left jugulodigastric lymphadenopathy with central hypoattenuation raising concern for necrotic lymphadenopathy.  Patient may criteria for sepsis at outside facility given IV fluids and vancomycin and Rocephin transferred here for ENT consultation.  Patient currently is able to maintain airway, not requiring oxygen and is hemodynamically stable.  She will be hospitalized for sepsis requiring IV antibiotics and ENT consultation in a.m.    Repeat labs returned lactic acid 4.5.  Patient is currently tachycardic heart rate 123.  Discussed with critical care will transfer to [facility] for closer monitoring.  ENT has been consulted.    I discussed the plan of care with patient, family, bedside RN, and outside transferring physician .    Review of Systems  Review of Systems   Constitutional:  Positive for chills and fever.   HENT:  Negative for hearing loss and tinnitus.    Eyes:  Negative for blurred vision and double vision.   Respiratory:   Negative for cough and hemoptysis.    Cardiovascular:  Negative for chest pain and palpitations.   Gastrointestinal:  Negative for heartburn and nausea.   Genitourinary:  Negative for dysuria and urgency.   Musculoskeletal:  Positive for neck pain. Negative for myalgias.   Neurological:  Negative for dizziness and headaches.   Endo/Heme/Allergies:  Does not bruise/bleed easily.   Psychiatric/Behavioral:  Negative for depression and suicidal ideas.        Past Medical History   has no past medical history on file.    Surgical History   has a past surgical history that includes other orthopedic surgery and primary c section (Bilateral, 7/16/2016).     Family History  family history is not on file.   Family history reviewed with patient. There is no family history that is pertinent to the chief complaint.     Social History   reports that she has been smoking cigarettes. She started smoking about 15 years ago. She has been smoking an average of .5 packs per day. She has never used smokeless tobacco. She reports current alcohol use. She reports current drug use. Drug: Inhaled.    Allergies  No Known Allergies    Medications  None       Physical Exam  Temp:  [38.7 °C (101.7 °F)] 38.7 °C (101.7 °F)  Pulse:  [123] 123  Resp:  [20] 20  BP: (114)/(72) 114/72  SpO2:  [99 %] 99 %  Blood Pressure: 114/72   Temperature: (!) 38.7 °C (101.7 °F)   Pulse: (!) 123   Respiration: 20   Pulse Oximetry: 99 %       Physical Exam  Vitals and nursing note reviewed.   Constitutional:       Appearance: Normal appearance. She is ill-appearing.   HENT:      Head: Normocephalic and atraumatic.      Right Ear: Tympanic membrane normal.      Left Ear: Tympanic membrane normal.      Nose: Nose normal.      Mouth/Throat:      Mouth: Mucous membranes are moist.      Pharynx: Oropharynx is clear.   Eyes:      Extraocular Movements: Extraocular movements intact.      Pupils: Pupils are equal, round, and reactive to light.   Neck:      Comments: Left  "semi firm mass noted along sternocleidomastoid muscle  TTP    Cardiovascular:      Rate and Rhythm: Regular rhythm. Tachycardia present.      Pulses: Normal pulses.      Heart sounds: Normal heart sounds.   Pulmonary:      Effort: Pulmonary effort is normal. No respiratory distress.      Breath sounds: Normal breath sounds. No stridor.   Abdominal:      General: Bowel sounds are normal. There is no distension.      Palpations: Abdomen is soft. There is no mass.   Musculoskeletal:         General: No swelling or tenderness. Normal range of motion.   Skin:     General: Skin is warm.      Capillary Refill: Capillary refill takes less than 2 seconds.      Coloration: Skin is not jaundiced or pale.   Neurological:      General: No focal deficit present.      Mental Status: She is alert and oriented to person, place, and time. Mental status is at baseline.   Psychiatric:         Mood and Affect: Mood normal.         Behavior: Behavior normal.         Laboratory:      Recent Labs     08/18/23  0542   SODIUM 136   POTASSIUM 3.7   CHLORIDE 99   CO2 22   GLUCOSE 110*   BUN 8   CREATININE 0.60   CALCIUM 9.2     Recent Labs     08/18/23  0542   GLUCOSE 110*         No results for input(s): \"NTPROBNP\" in the last 72 hours.      No results for input(s): \"TROPONINT\" in the last 72 hours.    Imaging:  No orders to display     CT at OSH   At outside facility patient met criteria for sepsis given vancomycin and Rocephin, and IV fluids.  CT of the neck showing compression of left jugular vein, extensive inflammatory changes in the left neck and may be secondary to cellulitis, myositis of sternocleidomastoid shows enlargement and hypertension.  23 x 15 mm left jugulodigastric lymphadenopathy with central hypo-attenuation raising concern for necrotic lymphadenopathy. Multiple posterior cervical chain lymphadenopathy noted    no X-Ray or EKG requiring interpretation    Assessment/Plan:  Justification for Admission Status  I anticipate " this patient will require at least two midnights for appropriate medical management, necessitating inpatient admission because sepsis secondary to cellulitis of neck, necrotic lymphadenopathy requiring IV antibiotics and ENT consultation.    Patient will need a Med/Surg bed on MEDICAL service .  The need is secondary to see above.    * Sepsis (HCC)- (present on admission)  Assessment & Plan  This is Sepsis Present on admission  SIRS criteria identified on my evaluation include: Fever, with temperature greater than 100.9 deg F and Tachycardia, with heart rate greater than 90 BPM  Clinical indicators of end organ dysfunction include Lactic Acid greater than 2  Source is necrotic lymphadenopathy, neck cellultis  Sepsis protocol initiated  Crystalloid Fluid Administration: Fluid resuscitation ordered per standard protocol - 30 mL/kg per current or ideal body weight  IV antibiotics as appropriate for source of sepsis  Reassessment: I have reassessed the patient's hemodynamic status    IV Fluids   Vancomycin per pharmacy   Check MRSA PCR  Unasyn 3 grams every 6 hours  Pain control  Low threshold to transfer to IMCU   Trend lactic acid    Cellulitis, neck  Assessment & Plan  Vancomycin/Unasyn   F/u cultures   ENT consulted -follow official recommendations     Acute lactic acidosis  Assessment & Plan  Trend   IV Fluids       Tobacco dependence  Assessment & Plan  Tobacco cessation education provided for more than 3 minutes, discussed options of nicotine patch, medical treatment with wellbutrin and chantix. Discussed the risks of smoking including increased risk of heart disease, stroke, cancer and COPD      Lymphadenopathy  Assessment & Plan  CT at outside facility 23 x 15 mm left jugulodigastric lymphadenopathy with central hypoattenuation concerning for necrotic lymphadenopathy, multiple posterior cervical chain lymphadenopathy noted  Extensive inflammation is in the left neck which may be secondary to cellulitis,  myositis sternocleidomastoid.    ENT consulted     Methamphetamine abuse (HCC)  Assessment & Plan  Encouraged sobriety       I independently reviewed pertinent clinical lab tests since admission and ordered additional follow up clinical lab tests.  I independently reviewed pertinent radiographic images and the radiologist's reports since admission and ordered additional follow up radiographic studies.  The details of the available patient records in [facility] (including laboratory tests, culture data, medications, imaging, and other pertinent diagnostic tests) and that information was utilized as warranted in today's medical decision making for this patient.  I personally evaluated the patient condition at bedside.     Care interventions include:   Review of interval medical and surgical history, current medications and outpatient medication reconciliation, interval imaging studies and radiologist interpretation, and interval laboratory values.     Aggregated care time spent evaluating, reassessing, reviewing documentation, providing care, and managing this patient exclusive of procedures: 75 minutes      Transfer to [facility] for closer monitoring.     VTE prophylaxis: SCDs/TEDs

## 2023-08-18 NOTE — ASSESSMENT & PLAN NOTE
"Likely secondary to IV drug use.  Will order echo to rule out endocarditis.  WBC downtrending, 10.8 today.   MRSA nares negative.  Wound cx reveals strep pyogenes. Continue unasyn  S/p multiple I&Ds with ENT. 1/4\" Penrose drain removed  Swelling and erythema greatly improved.  Pain has greatly improved as well  Continued swelling from before could have been due to inflammed lymph node.  Decadron started 8/23  -ENT following; okay to discharge tomorrow if patient is doing well  -Continue IV Unasyn   -pain management with oxy 5 mg and dilaudid       "

## 2023-08-18 NOTE — THERAPY
"Speech Language Pathology   Clinical Swallow Evaluation     Patient Name: Karlie Santos  AGE:  28 y.o., SEX:  female  Medical Record #: [id number]  Date of Service: 8/18/2023      History of Present Illness  28 y.o. female transferred from outside facility with rapidly enlarging left neck mass, fever, chills, and sore throat.     CT Neck: \"compression of left jugular veins with extensive inflammatory changes involving the left neck, myositis of sternocleidomastoid, 23 x 15 mm left jugulodigastric lymphadenopathy with central hypoattenuation raising concern for necrotic lymphadenopathy\"    PMHx: methamphetamine, tobacco abuse      General Information:  Vitals  O2 Delivery Device: None - Room Air  Level of Consciousness: Alert, Awake  Patient Behaviors: Flat Affect  Orientation: Self (Limited verbal output d/t reduced ability to open jaw/pain with movement)  Follows Directives: Yes      Prior Living Situation & Level of Function:  Lives with - Patient's Self Care Capacity: Significant Other  Swallowing: WFL       Oral Mechanism Evaluation:  Dentition: Fair   Facial Symmetry: Equal     Labial Observations: WFL   Lingual Observations: Midline        Laryngeal Function:  Secretion Management: Adequate  Cough: Perceptually WNL       Subjective  RN cleared patient for clinical swallow evaluation. Pt asleep upon arrival, easily arousable. Family at bedside. Per RN, pt has limited jaw mobility secondary to large left neck mass, resulting in limited verbal output. Pt communicated via head nod, mouthing of words, gesturing, and occasional whisper. Pt denied any history of swallowing difficulties. Pt agreeable to tasks.       Assessment  Current Method of Nutrition: NPO until cleared by speech pathology  Positioning: Dickey's (60-90 degrees)  Bolus Administration: SLP, Patient  O2 Delivery Device: None - Room Air  Factor(s) Affecting Performance: None  Tracheostomy : No      Swallowing Trials:  Swallowing Trials  Ice: WFL  Thin " Liquid (TN0): WFL  Liquidised (LQ3): WFL  Soft & Bite Sized (SB6): WFL  Regular (RG7): WFL      Comments: Pt demo'd adequate oral acceptance, labial assimilation to feeding tools, labial stripping, and oral containment. Presumed complete AP transit. Timely and complete mastication of solids despite reduced jaw mobility. Unable to visualize oral cavity to check for oral residue d/t jaw limitations, however pt endorsed no residue. No overt s/sx of aspiration across all consistencies. Educated pt on diet options, pt endorsed preference for regular solids vs. Easy to chew or soft&bite sized solids. Informed pt to alert RN if diet becomes too difficult to chew, patient stated understanding.       Clinical Impressions  Patient presents with a functional oropharyngeal swallow mechanism for recommended diet of regular solids/thin liquids. No overt s/sx of aspiration. No indication for diagnostic swallow mechanism at this time. No further acute speech therapy needs indicated at this junction, please reconsult should any difficulties or concerns arise.    Recommendations  Diet Consistency: Regular solids/thin liquids  Instrumentation: None indicated at this time  Medication: As tolerated  Oral Care: BID         SLP Treatment Plan  Treatment Plan: None Indicated  SLP Frequency: N/A - Evaluation Only  Estimated Duration: N/A - Evaluation Only      Anticipated Discharge Needs  Discharge Recommendations: Anticipate that the patient will have no further speech therapy needs after discharge from the hospital   Therapy Recommendations Upon DC: Not Indicated               SARI Whitten

## 2023-08-18 NOTE — PROGRESS NOTES
Med Rec complete per patient   Allergies reviewed  No oral antibiotics in the last 30 days  Preferred pharmacy: [facility] in [place]    Pt states she does not take any RX medications pt states she is not on Birth Control pt states she does not take any supplements or vitamins

## 2023-08-18 NOTE — PROGRESS NOTES
[facility] TRIAGE OFFICER DIRECT ADMISSION REPORT  Transferring facility: [facility]   Transferring physician: Dr. Juancarlos Leggett   Transferring facility/physician contact number: Dr. Leggett   Chief complaint: Sepsis with cellulitis of neck  Pertinent history & patient course: 28 female presented to outside facility with left-sided neck mass for the last 4 days rapidly increased in size over the last 24 hours.  Now having increased pain 10 out of 10 intensity exacerbated with opening jaw and movement of neck.  At outside facility patient met criteria for sepsis given vancomycin and Rocephin, and IV fluids.  CT of the neck showing compression of left jugular vein, extensive inflammatory changes in the left neck and may be secondary to cellulitis, myositis of sternocleidomastoid shows enlargement and hypertension.  23 x 15 mm left jugulodigastric lymphadenopathy with central hypo-attenuation raising concern for necrotic lymphadenopathy. Multiple posterior cervical chain lymphadenopathy noted.  High level of care requested for ENT consultation and given rapid increase of lymph adenopathy they would like to transfer here for closer monitoring.  At this time patient is hemodynamically stable, not requiring oxygen, airway patent.  Transfer center advised if there is a change in patient's clinical condition please update us so we can triage to appropriate floor accordingly.  Pertinent imaging & lab results: See above  Code Status: Full code  Further work up or recommendations per triage officer prior to transfer: None  Consultants called prior to transfer and pertinent input from consultants: None  Patient accepted for transfer: Yes  Consultants to be called upon arrival: ENT and IR in am   Admission status: Inpatient.   Floor requested: med/surg  If ICU transfer, name of intensivist case discussed with and pertinent input from critical care: none     Please inform the triage officer upon arrival of the patient to  [facility] for assignment of a hospitalist to perform admission.      For any question or concerns regarding the care of this patient, please reach out to the assigned hospitalist.

## 2023-08-18 NOTE — PROGRESS NOTES
4 Eyes Skin Assessment Completed by JONO Vieira and JONO Ham.    Head WDL  Ears WDL  Nose WDL  Mouth WDL  Neck WDL  Breast/Chest WDL  Shoulder Blades WDL  Spine WDL  (R) Arm/Elbow/Hand WDL  (L) Arm/Elbow/Hand WDL  Abdomen WDL  Groin WDL  Scrotum/Coccyx/Buttocks WDL  (R) Leg WDL  (L) Leg WDL  (R) Heel/Foot/Toe WDL  (L) Heel/Foot/Toe WDL          Devices In Places Blood Pressure Cuff      Interventions In Place Pillows    Possible Skin Injury No    Pictures Uploaded Into Epic N/A  Wound Consult Placed N/A  RN Wound Prevention Protocol Ordered No

## 2023-08-19 ENCOUNTER — APPOINTMENT (OUTPATIENT)
Dept: RADIOLOGY | Facility: MEDICAL CENTER | Age: 28
DRG: 854 | End: 2023-08-19
Attending: OTOLARYNGOLOGY
Payer: MEDICAID

## 2023-08-19 ENCOUNTER — ANESTHESIA (OUTPATIENT)
Dept: SURGERY | Facility: MEDICAL CENTER | Age: 28
DRG: 854 | End: 2023-08-19
Payer: MEDICAID

## 2023-08-19 ENCOUNTER — ANESTHESIA EVENT (OUTPATIENT)
Dept: SURGERY | Facility: MEDICAL CENTER | Age: 28
DRG: 854 | End: 2023-08-19
Payer: MEDICAID

## 2023-08-19 LAB
ALBUMIN SERPL BCP-MCNC: 2.8 G/DL (ref 3.2–4.9)
ALBUMIN/GLOB SERPL: 0.9 G/DL
ALP SERPL-CCNC: 147 U/L (ref 30–99)
ALT SERPL-CCNC: 44 U/L (ref 2–50)
ANION GAP SERPL CALC-SCNC: 9 MMOL/L (ref 7–16)
AST SERPL-CCNC: 40 U/L (ref 12–45)
BILIRUB SERPL-MCNC: 0.2 MG/DL (ref 0.1–1.5)
BUN SERPL-MCNC: 4 MG/DL (ref 8–22)
CALCIUM ALBUM COR SERPL-MCNC: 9 MG/DL (ref 8.5–10.5)
CALCIUM SERPL-MCNC: 8 MG/DL (ref 8.5–10.5)
CHLORIDE SERPL-SCNC: 101 MMOL/L (ref 96–112)
CO2 SERPL-SCNC: 22 MMOL/L (ref 20–33)
CREAT SERPL-MCNC: 0.45 MG/DL (ref 0.5–1.4)
ERYTHROCYTE [DISTWIDTH] IN BLOOD BY AUTOMATED COUNT: 45.8 FL (ref 35.9–50)
GFR SERPLBLD CREATININE-BSD FMLA CKD-EPI: 136 ML/MIN/1.73 M 2
GLOBULIN SER CALC-MCNC: 3.1 G/DL (ref 1.9–3.5)
GLUCOSE SERPL-MCNC: 107 MG/DL (ref 65–99)
HCG SERPL QL: NEGATIVE
HCT VFR BLD AUTO: 32.1 % (ref 37–47)
HGB BLD-MCNC: 10.5 G/DL (ref 12–16)
MAGNESIUM SERPL-MCNC: 1.5 MG/DL (ref 1.5–2.5)
MCH RBC QN AUTO: 30.9 PG (ref 27–33)
MCHC RBC AUTO-ENTMCNC: 32.7 G/DL (ref 32.2–35.5)
MCV RBC AUTO: 94.4 FL (ref 81.4–97.8)
PLATELET # BLD AUTO: 246 K/UL (ref 164–446)
PMV BLD AUTO: 11 FL (ref 9–12.9)
POTASSIUM SERPL-SCNC: 3.9 MMOL/L (ref 3.6–5.5)
PROT SERPL-MCNC: 5.9 G/DL (ref 6–8.2)
RBC # BLD AUTO: 3.4 M/UL (ref 4.2–5.4)
SODIUM SERPL-SCNC: 132 MMOL/L (ref 135–145)
WBC # BLD AUTO: 24.1 K/UL (ref 4.8–10.8)

## 2023-08-19 PROCEDURE — 700101 HCHG RX REV CODE 250: Performed by: OTOLARYNGOLOGY

## 2023-08-19 PROCEDURE — 85027 COMPLETE CBC AUTOMATED: CPT

## 2023-08-19 PROCEDURE — 160035 HCHG PACU - 1ST 60 MINS PHASE I: Performed by: OTOLARYNGOLOGY

## 2023-08-19 PROCEDURE — 0W963ZZ DRAINAGE OF NECK, PERCUTANEOUS APPROACH: ICD-10-PCS | Performed by: OTOLARYNGOLOGY

## 2023-08-19 PROCEDURE — 700105 HCHG RX REV CODE 258: Performed by: STUDENT IN AN ORGANIZED HEALTH CARE EDUCATION/TRAINING PROGRAM

## 2023-08-19 PROCEDURE — A9270 NON-COVERED ITEM OR SERVICE: HCPCS

## 2023-08-19 PROCEDURE — 700102 HCHG RX REV CODE 250 W/ 637 OVERRIDE(OP): Performed by: HOSPITALIST

## 2023-08-19 PROCEDURE — 700101 HCHG RX REV CODE 250: Performed by: STUDENT IN AN ORGANIZED HEALTH CARE EDUCATION/TRAINING PROGRAM

## 2023-08-19 PROCEDURE — 160002 HCHG RECOVERY MINUTES (STAT): Performed by: OTOLARYNGOLOGY

## 2023-08-19 PROCEDURE — 84703 CHORIONIC GONADOTROPIN ASSAY: CPT

## 2023-08-19 PROCEDURE — 87075 CULTR BACTERIA EXCEPT BLOOD: CPT

## 2023-08-19 PROCEDURE — 160027 HCHG SURGERY MINUTES - 1ST 30 MINS LEVEL 2: Performed by: OTOLARYNGOLOGY

## 2023-08-19 PROCEDURE — 80053 COMPREHEN METABOLIC PANEL: CPT

## 2023-08-19 PROCEDURE — 93005 ELECTROCARDIOGRAM TRACING: CPT | Performed by: INTERNAL MEDICINE

## 2023-08-19 PROCEDURE — 99233 SBSQ HOSP IP/OBS HIGH 50: CPT | Performed by: INTERNAL MEDICINE

## 2023-08-19 PROCEDURE — 700102 HCHG RX REV CODE 250 W/ 637 OVERRIDE(OP)

## 2023-08-19 PROCEDURE — 700105 HCHG RX REV CODE 258: Mod: JZ | Performed by: STUDENT IN AN ORGANIZED HEALTH CARE EDUCATION/TRAINING PROGRAM

## 2023-08-19 PROCEDURE — 700111 HCHG RX REV CODE 636 W/ 250 OVERRIDE (IP): Performed by: STUDENT IN AN ORGANIZED HEALTH CARE EDUCATION/TRAINING PROGRAM

## 2023-08-19 PROCEDURE — 87076 CULTURE ANAEROBE IDENT EACH: CPT

## 2023-08-19 PROCEDURE — 160009 HCHG ANES TIME/MIN: Performed by: OTOLARYNGOLOGY

## 2023-08-19 PROCEDURE — 700111 HCHG RX REV CODE 636 W/ 250 OVERRIDE (IP): Performed by: INTERNAL MEDICINE

## 2023-08-19 PROCEDURE — 700102 HCHG RX REV CODE 250 W/ 637 OVERRIDE(OP): Performed by: STUDENT IN AN ORGANIZED HEALTH CARE EDUCATION/TRAINING PROGRAM

## 2023-08-19 PROCEDURE — 700105 HCHG RX REV CODE 258: Performed by: INTERNAL MEDICINE

## 2023-08-19 PROCEDURE — 83735 ASSAY OF MAGNESIUM: CPT

## 2023-08-19 PROCEDURE — 87205 SMEAR GRAM STAIN: CPT

## 2023-08-19 PROCEDURE — 700102 HCHG RX REV CODE 250 W/ 637 OVERRIDE(OP): Performed by: INTERNAL MEDICINE

## 2023-08-19 PROCEDURE — A9270 NON-COVERED ITEM OR SERVICE: HCPCS | Performed by: INTERNAL MEDICINE

## 2023-08-19 PROCEDURE — 160048 HCHG OR STATISTICAL LEVEL 1-5: Performed by: OTOLARYNGOLOGY

## 2023-08-19 PROCEDURE — A9270 NON-COVERED ITEM OR SERVICE: HCPCS | Performed by: STUDENT IN AN ORGANIZED HEALTH CARE EDUCATION/TRAINING PROGRAM

## 2023-08-19 PROCEDURE — 700117 HCHG RX CONTRAST REV CODE 255: Performed by: OTOLARYNGOLOGY

## 2023-08-19 PROCEDURE — 70491 CT SOFT TISSUE NECK W/DYE: CPT

## 2023-08-19 PROCEDURE — 87070 CULTURE OTHR SPECIMN AEROBIC: CPT

## 2023-08-19 PROCEDURE — A9270 NON-COVERED ITEM OR SERVICE: HCPCS | Performed by: HOSPITALIST

## 2023-08-19 PROCEDURE — 770020 HCHG ROOM/CARE - TELE (206)

## 2023-08-19 PROCEDURE — 36415 COLL VENOUS BLD VENIPUNCTURE: CPT

## 2023-08-19 PROCEDURE — 160038 HCHG SURGERY MINUTES - EA ADDL 1 MIN LEVEL 2: Performed by: OTOLARYNGOLOGY

## 2023-08-19 RX ORDER — BUPIVACAINE HYDROCHLORIDE AND EPINEPHRINE 5; 5 MG/ML; UG/ML
INJECTION, SOLUTION EPIDURAL; INTRACAUDAL; PERINEURAL
Status: DISCONTINUED | OUTPATIENT
Start: 2023-08-19 | End: 2023-08-19 | Stop reason: HOSPADM

## 2023-08-19 RX ORDER — ROCURONIUM BROMIDE 10 MG/ML
INJECTION, SOLUTION INTRAVENOUS PRN
Status: DISCONTINUED | OUTPATIENT
Start: 2023-08-19 | End: 2023-08-19 | Stop reason: SURG

## 2023-08-19 RX ORDER — HYDROMORPHONE HYDROCHLORIDE 1 MG/ML
0.2 INJECTION, SOLUTION INTRAMUSCULAR; INTRAVENOUS; SUBCUTANEOUS
Status: DISCONTINUED | OUTPATIENT
Start: 2023-08-19 | End: 2023-08-19 | Stop reason: HOSPADM

## 2023-08-19 RX ORDER — HALOPERIDOL 5 MG/ML
1 INJECTION INTRAMUSCULAR
Status: DISCONTINUED | OUTPATIENT
Start: 2023-08-19 | End: 2023-08-19 | Stop reason: HOSPADM

## 2023-08-19 RX ORDER — SODIUM CHLORIDE, SODIUM LACTATE, POTASSIUM CHLORIDE, CALCIUM CHLORIDE 600; 310; 30; 20 MG/100ML; MG/100ML; MG/100ML; MG/100ML
INJECTION, SOLUTION INTRAVENOUS
Status: DISCONTINUED | OUTPATIENT
Start: 2023-08-19 | End: 2023-08-19 | Stop reason: SURG

## 2023-08-19 RX ORDER — OXYCODONE HYDROCHLORIDE 5 MG/1
2.5 TABLET ORAL ONCE
Status: COMPLETED | OUTPATIENT
Start: 2023-08-19 | End: 2023-08-19

## 2023-08-19 RX ORDER — ONDANSETRON 2 MG/ML
4 INJECTION INTRAMUSCULAR; INTRAVENOUS
Status: DISCONTINUED | OUTPATIENT
Start: 2023-08-19 | End: 2023-08-19 | Stop reason: HOSPADM

## 2023-08-19 RX ORDER — SODIUM CHLORIDE 9 MG/ML
INJECTION, SOLUTION INTRAVENOUS CONTINUOUS
Status: DISCONTINUED | OUTPATIENT
Start: 2023-08-19 | End: 2023-08-25 | Stop reason: HOSPADM

## 2023-08-19 RX ORDER — HYDRALAZINE HYDROCHLORIDE 20 MG/ML
5 INJECTION INTRAMUSCULAR; INTRAVENOUS
Status: DISCONTINUED | OUTPATIENT
Start: 2023-08-19 | End: 2023-08-19 | Stop reason: HOSPADM

## 2023-08-19 RX ORDER — ONDANSETRON 2 MG/ML
INJECTION INTRAMUSCULAR; INTRAVENOUS PRN
Status: DISCONTINUED | OUTPATIENT
Start: 2023-08-19 | End: 2023-08-19 | Stop reason: SURG

## 2023-08-19 RX ORDER — OXYCODONE HCL 5 MG/5 ML
10 SOLUTION, ORAL ORAL
Status: DISCONTINUED | OUTPATIENT
Start: 2023-08-19 | End: 2023-08-19 | Stop reason: HOSPADM

## 2023-08-19 RX ORDER — SUCCINYLCHOLINE CHLORIDE 20 MG/ML
INJECTION INTRAMUSCULAR; INTRAVENOUS PRN
Status: DISCONTINUED | OUTPATIENT
Start: 2023-08-19 | End: 2023-08-19 | Stop reason: SURG

## 2023-08-19 RX ORDER — EPHEDRINE SULFATE 50 MG/ML
5 INJECTION, SOLUTION INTRAVENOUS
Status: DISCONTINUED | OUTPATIENT
Start: 2023-08-19 | End: 2023-08-19 | Stop reason: HOSPADM

## 2023-08-19 RX ORDER — KETOROLAC TROMETHAMINE 30 MG/ML
30 INJECTION, SOLUTION INTRAMUSCULAR; INTRAVENOUS EVERY 6 HOURS PRN
Status: COMPLETED | OUTPATIENT
Start: 2023-08-19 | End: 2023-08-22

## 2023-08-19 RX ORDER — LIDOCAINE HYDROCHLORIDE 20 MG/ML
INJECTION, SOLUTION EPIDURAL; INFILTRATION; INTRACAUDAL; PERINEURAL PRN
Status: DISCONTINUED | OUTPATIENT
Start: 2023-08-19 | End: 2023-08-19 | Stop reason: SURG

## 2023-08-19 RX ORDER — MEPERIDINE HYDROCHLORIDE 25 MG/ML
12.5 INJECTION INTRAMUSCULAR; INTRAVENOUS; SUBCUTANEOUS
Status: DISCONTINUED | OUTPATIENT
Start: 2023-08-19 | End: 2023-08-19 | Stop reason: HOSPADM

## 2023-08-19 RX ORDER — OXYCODONE HYDROCHLORIDE 5 MG/1
5 TABLET ORAL EVERY 4 HOURS PRN
Status: DISCONTINUED | OUTPATIENT
Start: 2023-08-19 | End: 2023-08-25 | Stop reason: HOSPADM

## 2023-08-19 RX ORDER — SODIUM CHLORIDE, SODIUM LACTATE, POTASSIUM CHLORIDE, CALCIUM CHLORIDE 600; 310; 30; 20 MG/100ML; MG/100ML; MG/100ML; MG/100ML
INJECTION, SOLUTION INTRAVENOUS CONTINUOUS
Status: DISCONTINUED | OUTPATIENT
Start: 2023-08-19 | End: 2023-08-19 | Stop reason: HOSPADM

## 2023-08-19 RX ORDER — OXYCODONE HCL 5 MG/5 ML
5 SOLUTION, ORAL ORAL
Status: DISCONTINUED | OUTPATIENT
Start: 2023-08-19 | End: 2023-08-19 | Stop reason: HOSPADM

## 2023-08-19 RX ORDER — HYDROMORPHONE HYDROCHLORIDE 1 MG/ML
0.4 INJECTION, SOLUTION INTRAMUSCULAR; INTRAVENOUS; SUBCUTANEOUS
Status: DISCONTINUED | OUTPATIENT
Start: 2023-08-19 | End: 2023-08-19 | Stop reason: HOSPADM

## 2023-08-19 RX ORDER — DEXAMETHASONE SODIUM PHOSPHATE 4 MG/ML
INJECTION, SOLUTION INTRA-ARTICULAR; INTRALESIONAL; INTRAMUSCULAR; INTRAVENOUS; SOFT TISSUE PRN
Status: DISCONTINUED | OUTPATIENT
Start: 2023-08-19 | End: 2023-08-19 | Stop reason: SURG

## 2023-08-19 RX ORDER — HYDROMORPHONE HYDROCHLORIDE 1 MG/ML
0.5 INJECTION, SOLUTION INTRAMUSCULAR; INTRAVENOUS; SUBCUTANEOUS
Status: DISCONTINUED | OUTPATIENT
Start: 2023-08-19 | End: 2023-08-25 | Stop reason: HOSPADM

## 2023-08-19 RX ORDER — DIPHENHYDRAMINE HYDROCHLORIDE 50 MG/ML
12.5 INJECTION INTRAMUSCULAR; INTRAVENOUS
Status: DISCONTINUED | OUTPATIENT
Start: 2023-08-19 | End: 2023-08-19 | Stop reason: HOSPADM

## 2023-08-19 RX ORDER — ACETAMINOPHEN 325 MG/1
650 TABLET ORAL EVERY 4 HOURS PRN
Status: DISCONTINUED | OUTPATIENT
Start: 2023-08-19 | End: 2023-08-25 | Stop reason: HOSPADM

## 2023-08-19 RX ORDER — HYDROMORPHONE HYDROCHLORIDE 1 MG/ML
0.1 INJECTION, SOLUTION INTRAMUSCULAR; INTRAVENOUS; SUBCUTANEOUS
Status: DISCONTINUED | OUTPATIENT
Start: 2023-08-19 | End: 2023-08-19 | Stop reason: HOSPADM

## 2023-08-19 RX ADMIN — ALBUTEROL SULFATE 2.5 MG: 2.5 SOLUTION RESPIRATORY (INHALATION) at 20:05

## 2023-08-19 RX ADMIN — SODIUM CHLORIDE, POTASSIUM CHLORIDE, SODIUM LACTATE AND CALCIUM CHLORIDE: 600; 310; 30; 20 INJECTION, SOLUTION INTRAVENOUS at 20:29

## 2023-08-19 RX ADMIN — SODIUM CHLORIDE: 900 INJECTION INTRAVENOUS at 22:09

## 2023-08-19 RX ADMIN — SENNOSIDES AND DOCUSATE SODIUM 2 TABLET: 50; 8.6 TABLET ORAL at 16:24

## 2023-08-19 RX ADMIN — FENTANYL CITRATE 50 MCG: 50 INJECTION, SOLUTION INTRAMUSCULAR; INTRAVENOUS at 19:42

## 2023-08-19 RX ADMIN — PROPOFOL 100 MG: 10 INJECTION, EMULSION INTRAVENOUS at 19:29

## 2023-08-19 RX ADMIN — AMPICILLIN AND SULBACTAM 3 G: 1; 2 INJECTION, POWDER, FOR SOLUTION INTRAMUSCULAR; INTRAVENOUS at 18:20

## 2023-08-19 RX ADMIN — ROCURONIUM BROMIDE 20 MG: 50 INJECTION, SOLUTION INTRAVENOUS at 19:39

## 2023-08-19 RX ADMIN — HYDROMORPHONE HYDROCHLORIDE 0.5 MG: 1 INJECTION, SOLUTION INTRAMUSCULAR; INTRAVENOUS; SUBCUTANEOUS at 12:33

## 2023-08-19 RX ADMIN — SODIUM CHLORIDE, POTASSIUM CHLORIDE, SODIUM LACTATE AND CALCIUM CHLORIDE: 600; 310; 30; 20 INJECTION, SOLUTION INTRAVENOUS at 06:19

## 2023-08-19 RX ADMIN — ACETAMINOPHEN 650 MG: 325 TABLET, FILM COATED ORAL at 16:24

## 2023-08-19 RX ADMIN — LIDOCAINE HYDROCHLORIDE 30 MG: 20 INJECTION, SOLUTION EPIDURAL; INFILTRATION; INTRACAUDAL at 19:29

## 2023-08-19 RX ADMIN — IOHEXOL 80 ML: 350 INJECTION, SOLUTION INTRAVENOUS at 14:34

## 2023-08-19 RX ADMIN — FENTANYL CITRATE 50 MCG: 50 INJECTION, SOLUTION INTRAMUSCULAR; INTRAVENOUS at 19:29

## 2023-08-19 RX ADMIN — DEXAMETHASONE SODIUM PHOSPHATE 4 MG: 4 INJECTION INTRA-ARTICULAR; INTRALESIONAL; INTRAMUSCULAR; INTRAVENOUS; SOFT TISSUE at 19:32

## 2023-08-19 RX ADMIN — SODIUM CHLORIDE: 900 INJECTION INTRAVENOUS at 14:09

## 2023-08-19 RX ADMIN — AMPICILLIN AND SULBACTAM 3 G: 1; 2 INJECTION, POWDER, FOR SOLUTION INTRAMUSCULAR; INTRAVENOUS at 05:33

## 2023-08-19 RX ADMIN — HYDROMORPHONE HYDROCHLORIDE 0.5 MG: 1 INJECTION, SOLUTION INTRAMUSCULAR; INTRAVENOUS; SUBCUTANEOUS at 09:26

## 2023-08-19 RX ADMIN — CYCLOBENZAPRINE HYDROCHLORIDE 5 MG: 5 TABLET, FILM COATED ORAL at 08:11

## 2023-08-19 RX ADMIN — AMPICILLIN AND SULBACTAM 3 G: 1; 2 INJECTION, POWDER, FOR SOLUTION INTRAMUSCULAR; INTRAVENOUS at 12:22

## 2023-08-19 RX ADMIN — KETOROLAC TROMETHAMINE 30 MG: 30 INJECTION, SOLUTION INTRAMUSCULAR; INTRAVENOUS at 14:07

## 2023-08-19 RX ADMIN — SUCCINYLCHOLINE CHLORIDE 100 MG: 20 INJECTION, SOLUTION INTRAMUSCULAR; INTRAVENOUS at 19:29

## 2023-08-19 RX ADMIN — SODIUM CHLORIDE, POTASSIUM CHLORIDE, SODIUM LACTATE AND CALCIUM CHLORIDE: 600; 310; 30; 20 INJECTION, SOLUTION INTRAVENOUS at 19:24

## 2023-08-19 RX ADMIN — HYDROMORPHONE HYDROCHLORIDE 0.5 MG: 1 INJECTION, SOLUTION INTRAMUSCULAR; INTRAVENOUS; SUBCUTANEOUS at 04:52

## 2023-08-19 RX ADMIN — ONDANSETRON 4 MG: 2 INJECTION INTRAMUSCULAR; INTRAVENOUS at 19:50

## 2023-08-19 RX ADMIN — HYDROMORPHONE HYDROCHLORIDE 0.5 MG: 1 INJECTION, SOLUTION INTRAMUSCULAR; INTRAVENOUS; SUBCUTANEOUS at 16:23

## 2023-08-19 RX ADMIN — OXYCODONE HYDROCHLORIDE 2.5 MG: 5 TABLET ORAL at 07:55

## 2023-08-19 RX ADMIN — SUGAMMADEX 200 MG: 100 INJECTION, SOLUTION INTRAVENOUS at 19:50

## 2023-08-19 ASSESSMENT — ENCOUNTER SYMPTOMS
TREMORS: 0
VOMITING: 0
SHORTNESS OF BREATH: 0
SPUTUM PRODUCTION: 0
FOCAL WEAKNESS: 0
SPEECH CHANGE: 0
CHILLS: 0
DIZZINESS: 0
CONSTIPATION: 0
DOUBLE VISION: 0
TINGLING: 0
HEADACHES: 0
BACK PAIN: 0
COUGH: 0
MYALGIAS: 0
ORTHOPNEA: 0
NECK PAIN: 0
BLURRED VISION: 0
EYE PAIN: 0
WEIGHT LOSS: 0
PHOTOPHOBIA: 0
SENSORY CHANGE: 0
PALPITATIONS: 0
HALLUCINATIONS: 0
FEVER: 0
DIARRHEA: 0
NAUSEA: 0
ABDOMINAL PAIN: 0

## 2023-08-19 ASSESSMENT — PAIN DESCRIPTION - PAIN TYPE
TYPE: SURGICAL PAIN
TYPE: SURGICAL PAIN
TYPE: ACUTE PAIN
TYPE: SURGICAL PAIN
TYPE: ACUTE PAIN
TYPE: ACUTE PAIN
TYPE: SURGICAL PAIN
TYPE: ACUTE PAIN
TYPE: ACUTE PAIN
TYPE: SURGICAL PAIN

## 2023-08-19 ASSESSMENT — LIFESTYLE VARIABLES: SUBSTANCE_ABUSE: 0

## 2023-08-19 ASSESSMENT — PAIN SCALES - GENERAL: PAIN_LEVEL: 5

## 2023-08-19 ASSESSMENT — PATIENT HEALTH QUESTIONNAIRE - PHQ9
2. FEELING DOWN, DEPRESSED, IRRITABLE, OR HOPELESS: NOT AT ALL
1. LITTLE INTEREST OR PLEASURE IN DOING THINGS: NOT AT ALL
SUM OF ALL RESPONSES TO PHQ9 QUESTIONS 1 AND 2: 0

## 2023-08-19 ASSESSMENT — FIBROSIS 4 INDEX: FIB4 SCORE: 0.64

## 2023-08-19 NOTE — PROGRESS NOTES
"Otolaryngology Progress Note    ID: Karlie Santos is a 28 y.o. female admitted for L neck myositis and necrotic lymph node    Events: Repeat CT today    S: pain worse than yesterday    O:  /73   Pulse 99   Temp 37.1 °C (98.7 °F) (Temporal)   Resp 18   Ht 1.702 m (5' 7\")   Wt 67 kg (147 lb 11.3 oz)   SpO2 94%       Recent Labs     08/18/23  0542 08/19/23  0530   WBC 31.1* 24.1*   RBC 3.98* 3.40*   HEMOGLOBIN 12.3 10.5*   HEMATOCRIT 37.8 32.1*   MCV 95.0 94.4   MCH 30.9 30.9   MCHC 32.5 32.7   RDW 45.1 45.8   PLATELETCT 279 246   MPV 11.2 11.0     Recent Labs     08/18/23  0542 08/19/23  0530   SODIUM 136 132*   POTASSIUM 3.7 3.9   CHLORIDE 99 101   CO2 22 22   GLUCOSE 110* 107*   BUN 8 4*   CREATININE 0.60 0.45*   CALCIUM 9.2 8.0*     Recent Labs     08/18/23  0840   INR 1.24*     CT neck 8/19:  IMPRESSION:  1.  Probably mildly worsening extensive inflammatory changes within the superficial and deep left neck, with slightly increased retropharyngeal effusion and some new superior mesenteric fat stranding suspicious for mediastinitis.  2.  Asymmetric left cervical lymph nodes which are presumably reactive with associated necrosis of upper left cervical lymph node.  3.  New mild thickening/edema of the epiglottis.  4.  Small bilateral pleural effusions.    A/P: Karlie Santos is a 28 y.o. female with worsening retropharyngeal abscess. WBC 24 from 31. Will plan to go to OR today for I&D when the OR has time. Will attempt transoral drainage.    Please don't hesitate to call with questions or concerns.    Maverick Benson M.D.  [phone]   "

## 2023-08-19 NOTE — PROGRESS NOTES
Dr. Aminata soares with the patient transferring to the floor at this point in time, believes will be appropriate after OR.

## 2023-08-19 NOTE — CARE PLAN
The patient is Stable - Low risk of patient condition declining or worsening    Shift Goals  Clinical Goals:  (pain control)  Patient Goals: Rest    Progress made toward(s) clinical / shift goals:    Problem: Knowledge Deficit - Standard  Goal: Patient and family/care givers will demonstrate understanding of plan of care, disease process/condition, diagnostic tests and medications  Outcome: Progressing     Problem: Hemodynamics  Goal: Patient's hemodynamics, fluid balance and neurologic status will be stable or improve  Outcome: Progressing     Problem: Fluid Volume  Goal: Fluid volume balance will be maintained  Outcome: Progressing       Patient is not progressing towards the following goals:      Problem: Pain - Standard  Goal: Alleviation of pain or a reduction in pain to the patient’s comfort goal  Outcome: Not Progressing   Modified pain control per MD, updated patient on plan of care.

## 2023-08-19 NOTE — PROGRESS NOTES
Patient transferred to [facility] with all belongs and updates given to Lb. Patient ambulated from door to bed.

## 2023-08-19 NOTE — PROGRESS NOTES
Monitor Summary    Heart rate     Sinus rhythm to sinus tachycardia  RP 0.17/QRS 0.05/QT 0.249

## 2023-08-19 NOTE — PROGRESS NOTES
Spoke with Dr. Johansen at [phone] and he was okay with transferring patient to tele since it was okay with Dr. Benson.

## 2023-08-20 PROBLEM — J39.0 RETROPHARYNGEAL ABSCESS: Status: ACTIVE | Noted: 2023-08-18

## 2023-08-20 LAB
ALBUMIN SERPL BCP-MCNC: 2.8 G/DL (ref 3.2–4.9)
ALBUMIN/GLOB SERPL: 0.8 G/DL
ALP SERPL-CCNC: 187 U/L (ref 30–99)
ALT SERPL-CCNC: 44 U/L (ref 2–50)
ANION GAP SERPL CALC-SCNC: 10 MMOL/L (ref 7–16)
AST SERPL-CCNC: 36 U/L (ref 12–45)
BILIRUB SERPL-MCNC: 0.2 MG/DL (ref 0.1–1.5)
BUN SERPL-MCNC: 5 MG/DL (ref 8–22)
CALCIUM ALBUM COR SERPL-MCNC: 9.6 MG/DL (ref 8.5–10.5)
CALCIUM SERPL-MCNC: 8.6 MG/DL (ref 8.5–10.5)
CHLORIDE SERPL-SCNC: 100 MMOL/L (ref 96–112)
CO2 SERPL-SCNC: 26 MMOL/L (ref 20–33)
CREAT SERPL-MCNC: 0.37 MG/DL (ref 0.5–1.4)
EKG IMPRESSION: NORMAL
ERYTHROCYTE [DISTWIDTH] IN BLOOD BY AUTOMATED COUNT: 45.6 FL (ref 35.9–50)
GFR SERPLBLD CREATININE-BSD FMLA CKD-EPI: 142 ML/MIN/1.73 M 2
GLOBULIN SER CALC-MCNC: 3.3 G/DL (ref 1.9–3.5)
GLUCOSE SERPL-MCNC: 106 MG/DL (ref 65–99)
GRAM STN SPEC: NORMAL
HCT VFR BLD AUTO: 34.7 % (ref 37–47)
HGB BLD-MCNC: 11.2 G/DL (ref 12–16)
MAGNESIUM SERPL-MCNC: 1.9 MG/DL (ref 1.5–2.5)
MCH RBC QN AUTO: 29.9 PG (ref 27–33)
MCHC RBC AUTO-ENTMCNC: 32.3 G/DL (ref 32.2–35.5)
MCV RBC AUTO: 92.8 FL (ref 81.4–97.8)
PLATELET # BLD AUTO: 264 K/UL (ref 164–446)
PMV BLD AUTO: 10.8 FL (ref 9–12.9)
POTASSIUM SERPL-SCNC: 3.9 MMOL/L (ref 3.6–5.5)
PROT SERPL-MCNC: 6.1 G/DL (ref 6–8.2)
RBC # BLD AUTO: 3.74 M/UL (ref 4.2–5.4)
SIGNIFICANT IND 70042: NORMAL
SITE SITE: NORMAL
SODIUM SERPL-SCNC: 136 MMOL/L (ref 135–145)
SOURCE SOURCE: NORMAL
WBC # BLD AUTO: 20.8 K/UL (ref 4.8–10.8)

## 2023-08-20 PROCEDURE — 85027 COMPLETE CBC AUTOMATED: CPT

## 2023-08-20 PROCEDURE — 80053 COMPREHEN METABOLIC PANEL: CPT

## 2023-08-20 PROCEDURE — 36415 COLL VENOUS BLD VENIPUNCTURE: CPT

## 2023-08-20 PROCEDURE — 700111 HCHG RX REV CODE 636 W/ 250 OVERRIDE (IP): Mod: JZ | Performed by: STUDENT IN AN ORGANIZED HEALTH CARE EDUCATION/TRAINING PROGRAM

## 2023-08-20 PROCEDURE — 700105 HCHG RX REV CODE 258: Performed by: STUDENT IN AN ORGANIZED HEALTH CARE EDUCATION/TRAINING PROGRAM

## 2023-08-20 PROCEDURE — 700102 HCHG RX REV CODE 250 W/ 637 OVERRIDE(OP): Performed by: INTERNAL MEDICINE

## 2023-08-20 PROCEDURE — 770001 HCHG ROOM/CARE - MED/SURG/GYN PRIV*

## 2023-08-20 PROCEDURE — A9270 NON-COVERED ITEM OR SERVICE: HCPCS | Performed by: HOSPITALIST

## 2023-08-20 PROCEDURE — A9270 NON-COVERED ITEM OR SERVICE: HCPCS | Performed by: INTERNAL MEDICINE

## 2023-08-20 PROCEDURE — 700102 HCHG RX REV CODE 250 W/ 637 OVERRIDE(OP): Performed by: HOSPITALIST

## 2023-08-20 PROCEDURE — 700105 HCHG RX REV CODE 258: Performed by: INTERNAL MEDICINE

## 2023-08-20 PROCEDURE — 83735 ASSAY OF MAGNESIUM: CPT

## 2023-08-20 PROCEDURE — 93010 ELECTROCARDIOGRAM REPORT: CPT | Performed by: INTERNAL MEDICINE

## 2023-08-20 PROCEDURE — 99232 SBSQ HOSP IP/OBS MODERATE 35: CPT | Mod: GC | Performed by: INTERNAL MEDICINE

## 2023-08-20 RX ADMIN — SODIUM CHLORIDE: 900 INJECTION INTRAVENOUS at 07:03

## 2023-08-20 RX ADMIN — ACETAMINOPHEN 650 MG: 325 TABLET, FILM COATED ORAL at 21:07

## 2023-08-20 RX ADMIN — OXYCODONE HYDROCHLORIDE 5 MG: 5 TABLET ORAL at 13:22

## 2023-08-20 RX ADMIN — AMPICILLIN AND SULBACTAM 3 G: 1; 2 INJECTION, POWDER, FOR SOLUTION INTRAMUSCULAR; INTRAVENOUS at 05:40

## 2023-08-20 RX ADMIN — AMPICILLIN AND SULBACTAM 3 G: 1; 2 INJECTION, POWDER, FOR SOLUTION INTRAMUSCULAR; INTRAVENOUS at 17:52

## 2023-08-20 RX ADMIN — OXYCODONE HYDROCHLORIDE 5 MG: 5 TABLET ORAL at 17:55

## 2023-08-20 RX ADMIN — CYCLOBENZAPRINE HYDROCHLORIDE 5 MG: 5 TABLET, FILM COATED ORAL at 21:06

## 2023-08-20 RX ADMIN — AMPICILLIN AND SULBACTAM 3 G: 1; 2 INJECTION, POWDER, FOR SOLUTION INTRAMUSCULAR; INTRAVENOUS at 23:28

## 2023-08-20 RX ADMIN — OXYCODONE HYDROCHLORIDE 5 MG: 5 TABLET ORAL at 08:35

## 2023-08-20 RX ADMIN — AMPICILLIN AND SULBACTAM 3 G: 1; 2 INJECTION, POWDER, FOR SOLUTION INTRAMUSCULAR; INTRAVENOUS at 01:39

## 2023-08-20 RX ADMIN — AMPICILLIN AND SULBACTAM 3 G: 1; 2 INJECTION, POWDER, FOR SOLUTION INTRAMUSCULAR; INTRAVENOUS at 11:47

## 2023-08-20 RX ADMIN — SODIUM CHLORIDE: 900 INJECTION INTRAVENOUS at 17:52

## 2023-08-20 ASSESSMENT — ENCOUNTER SYMPTOMS
PALPITATIONS: 0
LOSS OF CONSCIOUSNESS: 0
COUGH: 0
SHORTNESS OF BREATH: 0
SEIZURES: 0
SORE THROAT: 0
INSOMNIA: 0
STRIDOR: 0
FEVER: 0
ABDOMINAL PAIN: 0

## 2023-08-20 ASSESSMENT — COGNITIVE AND FUNCTIONAL STATUS - GENERAL
MOBILITY SCORE: 24
SUGGESTED CMS G CODE MODIFIER DAILY ACTIVITY: CH
DAILY ACTIVITIY SCORE: 24
SUGGESTED CMS G CODE MODIFIER MOBILITY: CH

## 2023-08-20 ASSESSMENT — PAIN DESCRIPTION - PAIN TYPE
TYPE: ACUTE PAIN;SURGICAL PAIN
TYPE: ACUTE PAIN
TYPE: ACUTE PAIN;SURGICAL PAIN

## 2023-08-20 ASSESSMENT — FIBROSIS 4 INDEX: FIB4 SCORE: 0.53

## 2023-08-20 NOTE — CARE PLAN
The patient is Watcher - Medium risk of patient condition declining or worsening    Shift Goals  Clinical Goals: IV Antibiotics, Comfort, Sleep  Patient Goals: Sleep, Comfort, Good Oxygenation  Family Goals: LATONYA    Progress made toward(s) clinical / shift goals:    Problem: Knowledge Deficit - Standard  Goal: Patient and family/care givers will demonstrate understanding of plan of care, disease process/condition, diagnostic tests and medications  Outcome: Progressing  Note: Pt educated about the disease process, course of treatment and medications to be administered.      Problem: Hemodynamics  Goal: Patient's hemodynamics, fluid balance and neurologic status will be stable or improve  Outcome: Progressing  Note: Pt's vital signs monitored, intake and output monitored, daily weight checked, monitored O2 sat, pt's level of consciousness assessed.      Problem: Fluid Volume  Goal: Fluid volume balance will be maintained  Outcome: Progressing  Note: Pt assessed for fluid insufficiency and fluid overload.      Problem: Urinary - Renal Perfusion  Goal: Ability to achieve and maintain adequate renal perfusion and functioning will improve  Outcome: Progressing  Note: Pt's urinary output assessed and documented. Pt assessed for signs and symptoms of renal dysfunction.      Problem: Respiratory  Goal: Patient will achieve/maintain optimum respiratory ventilation and gas exchange  Outcome: Progressing  Flowsheets (Taken 8/20/2023 0418)  O2 Delivery Device: Nasal Cannula  Deep Breathe and Cough: Performs Correctly  Note: Pt's lung sounds auscultated. Pt's O2 sat monitored. Pt positioned for effective oxygenation. Pt assessed for signs and symptoms of respiratory distress.      Problem: Physical Regulation  Goal: Diagnostic test results will improve  Outcome: Progressing  Note: Pt's labs and diagnostic tests monitored.      Problem: Mechanical Ventilation  Goal: Safe management of artificial airway and ventilation  Outcome:  Met     Problem: Mechanical Ventilation  Goal: Successful weaning off mechanical ventilator, spontaneously maintains adequate gas exchange  Outcome: Met     Problem: Mechanical Ventilation  Goal: Patient will be able to express needs and understand communication  Outcome: Met

## 2023-08-20 NOTE — PROGRESS NOTES
[facility] Internal Medicine Daily Progress Note    Date of Service  8/20/2023    R Team: R IM Blue Team   Attending: Perfecto Ramirez M.d.  Senior Resident: Dr. Naveed Hernandez  Intern:  Dr. Rio Walton  Contact Number: [phone]    Chief Complaint  Karlie Santos is a 28 y.o. female admitted 8/18/2023 with retropharyngeal abscess    Hospital Course  No notes on file    Interval Problem Update  -No acute events overnight.  Patient overall seems improved.  Tolerating mild diet.   -Likely discharge tomorrow if continued improvement.    I have discussed this patient's plan of care and discharge plan at IDT rounds today with Case Management, Nursing, Nursing leadership, and other members of the IDT team.    Consultants/Specialty  Otolaryngology    Code Status  Full Code    Disposition  The patient is not medically cleared for discharge to home or a post-acute facility.      I have placed the appropriate orders for post-discharge needs.    Review of Systems  Review of Systems   Constitutional:  Negative for fever and malaise/fatigue.   HENT:  Negative for sore throat.    Respiratory:  Negative for cough, shortness of breath and stridor.    Cardiovascular:  Negative for chest pain, palpitations and leg swelling.   Gastrointestinal:  Negative for abdominal pain.   Neurological:  Negative for seizures and loss of consciousness.   Psychiatric/Behavioral:  The patient does not have insomnia.         Physical Exam  Temp:  [35.9 °C (96.6 °F)-36.7 °C (98.1 °F)] 36.4 °C (97.6 °F)  Pulse:  [62-93] 84  Resp:  [13-28] 16  BP: ()/(49-83) 93/49  SpO2:  [87 %-100 %] 94 %    Physical Exam  Vitals and nursing note reviewed.   Constitutional:       General: She is not in acute distress.     Appearance: Normal appearance. She is normal weight.   HENT:      Head: Normocephalic and atraumatic.      Comments: Neck incision is mildly tender without significant erythema or edema.     Right Ear: External ear normal.      Left Ear: External ear  normal.   Cardiovascular:      Rate and Rhythm: Normal rate and regular rhythm.      Pulses: Normal pulses.      Heart sounds: Normal heart sounds. No murmur heard.  Pulmonary:      Effort: Pulmonary effort is normal. No respiratory distress.   Abdominal:      General: Abdomen is flat. There is no distension.   Musculoskeletal:         General: No swelling, deformity or signs of injury.   Skin:     Coloration: Skin is not jaundiced.   Neurological:      General: No focal deficit present.      Mental Status: She is alert and oriented to person, place, and time. Mental status is at baseline.   Psychiatric:         Mood and Affect: Mood normal.         Thought Content: Thought content normal.         Fluids    Intake/Output Summary (Last 24 hours) at 8/20/2023 1545  Last data filed at 8/20/2023 0935  Gross per 24 hour   Intake 1100 ml   Output 20 ml   Net 1080 ml       Laboratory  Recent Labs     08/18/23  0542 08/19/23  0530 08/20/23  0142   WBC 31.1* 24.1* 20.8*   RBC 3.98* 3.40* 3.74*   HEMOGLOBIN 12.3 10.5* 11.2*   HEMATOCRIT 37.8 32.1* 34.7*   MCV 95.0 94.4 92.8   MCH 30.9 30.9 29.9   MCHC 32.5 32.7 32.3   RDW 45.1 45.8 45.6   PLATELETCT 279 246 264   MPV 11.2 11.0 10.8     Recent Labs     08/18/23  0542 08/19/23  0530 08/20/23  0142   SODIUM 136 132* 136   POTASSIUM 3.7 3.9 3.9   CHLORIDE 99 101 100   CO2 22 22 26   GLUCOSE 110* 107* 106*   BUN 8 4* 5*   CREATININE 0.60 0.45* 0.37*   CALCIUM 9.2 8.0* 8.6     Recent Labs     08/18/23  0840   INR 1.24*               Imaging  CT-SOFT TISSUE NECK WITH   Final Result      1.  Probably mildly worsening extensive inflammatory changes within the superficial and deep left neck, with slightly increased retropharyngeal effusion and some new superior mesenteric fat stranding suspicious for mediastinitis.   2.  Asymmetric left cervical lymph nodes which are presumably reactive with associated necrosis of upper left cervical lymph node.   3.  New mild thickening/edema of the  epiglottis.   4.  Small bilateral pleural effusions.      CT-FOREIGN FILM CAT SCAN   Final Result           Assessment/Plan  Problem Representation:    * Sepsis (HCC)- (present on admission)  Assessment & Plan  This is Sepsis Present on admission  SIRS criteria identified on my evaluation include: Fever, with temperature greater than 100.9 deg F and Tachycardia, with heart rate greater than 90 BPM  Clinical indicators of end organ dysfunction include Lactic Acid greater than 2  Source is necrotic lymphadenopathy, neck cellultis  Sepsis protocol initiated  Crystalloid Fluid Administration: Fluid resuscitation ordered per standard protocol - 30 mL/kg per current or ideal body weight  IV antibiotics as appropriate for source of sepsis  Reassessment: I have reassessed the patient's hemodynamic status    Secondary to retropharyngeal abscess in context of active IV drug use.  MRSA nares came back negative  Continuing IV Unasyn.  Will likely transfer to oral Augmentin later in hospital course if continued clinical improvement  Follow culture results  Continue administration of pain control.    Retropharyngeal abscess  Assessment & Plan  MRSA nares came back negative-vancomycin discontinued  Continue IV Unasyn.  Follow culture results.  Still pending at this time  Status post source control with ENT; retropharyngeal abscess drainage  Pain management: Oxy 5 mg, Dilaudid on board for breakthrough pain but has not been in pain since surgery at this time      Acute lactic acidosis  Assessment & Plan  Now resolved with IV fluids    Tobacco dependence  Assessment & Plan  Tobacco cessation education provided for more than 3 minutes, discussed options of nicotine patch, medical treatment with wellbutrin and chantix. Discussed the risks of smoking including increased risk of heart disease, stroke, cancer and COPD      Lymphadenopathy  Assessment & Plan  CT at outside facility 23 x 15 mm left jugulodigastric lymphadenopathy with  central hypoattenuation concerning for necrotic lymphadenopathy, multiple posterior cervical chain lymphadenopathy noted.  Repeat CT scan showed worsening of inflammation pain    Status post drainage of retropharyngeal abscess.  Continuing antibiotics as above.      Methamphetamine abuse (HCC)  Assessment & Plan  Encouraged sobriety          VTE prophylaxis: pharmacologic prophylaxis contraindicated due to potential need for repeat incision and drainage.    I have performed a physical exam and reviewed and updated ROS and Plan today (8/20/2023). In review of yesterday's note (8/19/2023), there are no changes except as documented above.

## 2023-08-20 NOTE — OR NURSING
2000: Rec'd pt from OR rn and MD. Assessed pt for arousable, productive coughing (pt swallowed secretions), and denies pain. Assessed left neck site for moderate swelling with no obstructing of airway, and internal mouth incisions.     2005: Assessed saturation for low 79-82 with 8L FM, with resp rate 22-25. Lung sounds audible loose coarse throughout. Pt unable to perform strong coughs in order to clear lungs secretions. Initiating albuterol treatment.     2030:Neb treatment complete. No change in lung sounds. Saturation increased to 92% with 4 L NC. Room air saturation 87-88%. Resp rate 16-18.No sob noted. Pt denies pain or nausea at this time. No drainage via oral incisions. Other VS stable. Phase 1 complete. Preparing to call report for rm [id number].

## 2023-08-20 NOTE — ANESTHESIA PREPROCEDURE EVALUATION
Case: [id number] Date/Time: 23    Procedure: INCISION & DRAINAGE, NECK ABSCESS    Location: [facility] OR  / SURGERY [facility]    Surgeons: Maverick Benson M.D.          Relevant Problems   OB   (positive)  labor       Physical Exam    Airway   Mallampati: II  TM distance: >3 FB  Neck ROM: full       Cardiovascular - normal exam  Rhythm: regular  Rate: normal  (-) murmur     Dental - normal exam            Pulmonary - normal exam  Breath sounds clear to auscultation     Abdominal    Neurological - normal exam               Anesthesia Plan    ASA 4   ASA physical status 4 criteria: sepsis    Plan - general       Airway plan will be ETT    (Air way abscess, recent meth use)      Induction: intravenous    Postoperative Plan: Postoperative administration of opioids is intended.    Pertinent diagnostic labs and testing reviewed    Informed Consent:    Anesthetic plan and risks discussed with patient.    Use of blood products discussed with: patient whom consented to blood products.

## 2023-08-20 NOTE — PROGRESS NOTES
Telemetry Report:    SR 75-85  PAC, PVC  .16/.05/.32        Per [facility] from Monitor Room

## 2023-08-20 NOTE — ANESTHESIA PROCEDURE NOTES
Airway    Date/Time: 8/19/2023 7:30 PM    Performed by: Feliz Austin D.O.  Authorized by: Feliz Ausitn D.O.    Location:  OR  Urgency:  Elective  Difficult Airway: No    Indications for Airway Management:  Anesthesia      Spontaneous Ventilation: absent    Sedation Level:  Deep  Preoxygenated: Yes    Patient Position:  Sniffing  Mask Difficulty Assessment:  2 - vent by mask + OA or adjuvant +/- NMBA  Final Airway Type:  Endotracheal airway  Final Endotracheal Airway:  ETT and AALIYAH tube  Cuffed: Yes    Technique Used for Successful ETT Placement:  Direct laryngoscopy    Insertion Site:  Oral  Blade Type:  Kae  Laryngoscope Blade/Videolaryngoscope Blade Size:  3  ETT Size (mm):  6.5  Measured from:  Teeth  ETT to Teeth (cm):  21  Placement Verified by: auscultation and capnometry    Cormack-Lehane Classification:  Grade IIa - partial view of glottis  Number of Attempts at Approach:  1

## 2023-08-20 NOTE — PROGRESS NOTES
"Otolaryngology Progress Note    ID: Karlie Santos is a 28 y.o. female admitted for retropharyngeal cellulitis/abscess    Events: had I&D yesterday    S: Feeling much better this morning. Able to swallow, and move her neck    O:  /61   Pulse 62   Temp 36.2 °C (97.2 °F) (Temporal)   Resp 18   Ht 1.702 m (5' 7\")   Wt 71.6 kg (157 lb 13.6 oz)   SpO2 100%   Gen: interactive and appropriate, much less uncomfortable appearing  Pulm: Breathing comfortably on RA without stridor or stertor  Face: symmetric, no edema, facial strength intact bilaterally  Eyes: conjunctiva clear, no chemosis. Vision grossly intact bilaterally  Ears: pinna normal. Hearing grossly intact  Neck: less erythema, improved ROM    Recent Labs     08/18/23  0542 08/19/23  0530 08/20/23  0142   WBC 31.1* 24.1* 20.8*   RBC 3.98* 3.40* 3.74*   HEMOGLOBIN 12.3 10.5* 11.2*   HEMATOCRIT 37.8 32.1* 34.7*   MCV 95.0 94.4 92.8   MCH 30.9 30.9 29.9   MCHC 32.5 32.7 32.3   RDW 45.1 45.8 45.6   PLATELETCT 279 246 264   MPV 11.2 11.0 10.8     Recent Labs     08/18/23  0542 08/19/23  0530 08/20/23  0142   SODIUM 136 132* 136   POTASSIUM 3.7 3.9 3.9   CHLORIDE 99 101 100   CO2 22 22 26   GLUCOSE 110* 107* 106*   BUN 8 4* 5*   CREATININE 0.60 0.45* 0.37*   CALCIUM 9.2 8.0* 8.6     Recent Labs     08/18/23  0840   INR 1.24*       A/P:Karlie Santos is a 28 y.o. female with retropharyngeal cellulitis/phlegmon s/p I&D 8/19/23. Clinically improving and WBC down to 20 from 31. Culture negative but pending. I recommend another 24h of IV abx to make sure she continues her improvement. If she is better again tomorrow, she can be discharged. If she worsens, she might need another I&D, as sometimes happens with neck infections. Please call me tomorrow if she is worse, otherwise, I will plan not to see her again. She can f/u with me outpatient prn.     Please don't hesitate to call with questions or concerns.    Maverick Benson M.D.  [phone]   "

## 2023-08-20 NOTE — CARE PLAN
The patient is Stable - Low risk of patient condition declining or worsening    Shift Goals  Clinical Goals: IV abx, monitor necking swelling/airway safety  Patient Goals: decreased pain  Family Goals: LATONYA    Progress made toward(s) clinical / shift goals:     Problem: Knowledge Deficit - Standard  Goal: Patient and family/care givers will demonstrate understanding of plan of care, disease process/condition, diagnostic tests and medications  Outcome: Progressing  Note: L neck swelling and redness improved today. Pt aware of plan for cont'd IV abx and monitoring airway.     Problem: Pain - Standard  Goal: Alleviation of pain or a reduction in pain to the patient’s comfort goal  Outcome: Progressing  Note: Pain much improved today per pt statement, PRN oxy 5mg with relief.       Patient is not progressing towards the following goals:

## 2023-08-20 NOTE — PROGRESS NOTES
Hospital Medicine Daily Progress Note    Date of Service  8/19/2023    Chief Complaint  Karlie Santos is a 28 y.o. female admitted 8/18/2023 with neck abscess    Hospital Course    Karlie Santos is a 28 y.o. female past medical history of methamphetamine, tobacco abuse who presented 8/18/2023 to [facility] with rapidly enlarging left neck mass that she noticed 4 days ago.  She reports associated fever, chills and sore throat.  She reports her neck pain is worse with opening jaw, movement of neck.  Does report using methamphetamine earlier today.  Denies any IV drugs.  CT soft tissue neck showing compression of left jugular veins with extensive inflammatory changes involving the left neck, myositis of sternocleidomastoid, 23 x 15 mm left jugulodigastric lymphadenopathy with central hypoattenuation raising concern for necrotic lymphadenopathy.  Patient may criteria for sepsis at outside facility given IV fluids and vancomycin and Rocephin transferred here for ENT consultation.  Patient currently is able to maintain airway, not requiring oxygen and is hemodynamically stable.    ENT evaluated and recommended continue IV antibiotics and n.p.o. from midnight just in case if symptoms get worse.    Interval Problem Update  08/19/23    I evaluated and examined her at the bedside.  She reported severe pain in her neck.  I increase the frequency of IV Dilaudid to every 3 hourly.  Despite giving her frequent doses of IV Dilaudid she continued to have pain.  I started her on IV ketorolac.  I discussed plan of care with ENT and patient remained repeat CT scan that showed worsening of her edema.  Plan is to take her to the OR later this afternoon.  I discussed plan of care with patient and bedside RN.  I am continuing IV antibiotics.    I have discussed this patient's plan of care and discharge plan at IDT rounds today with Case Management, Nursing, Nursing leadership, and other members of the IDT  team.    Consultants/Specialty  ENT    Code Status  Full Code    Disposition  The patient is not medically cleared for discharge to home or a post-acute facility.  Anticipate discharge to: home with close outpatient follow-up    I have placed the appropriate orders for post-discharge needs.    Review of Systems  Review of Systems   Constitutional:  Negative for chills, fever and weight loss.   HENT:  Negative for hearing loss and tinnitus.         Neck pain   Eyes:  Negative for blurred vision, double vision, photophobia and pain.   Respiratory:  Negative for cough, sputum production and shortness of breath.    Cardiovascular:  Negative for chest pain, palpitations, orthopnea and leg swelling.   Gastrointestinal:  Negative for abdominal pain, constipation, diarrhea, nausea and vomiting.   Genitourinary:  Negative for dysuria, frequency and urgency.   Musculoskeletal:  Negative for back pain, joint pain, myalgias and neck pain.   Skin:  Negative for rash.   Neurological:  Negative for dizziness, tingling, tremors, sensory change, speech change, focal weakness and headaches.   Psychiatric/Behavioral:  Negative for hallucinations and substance abuse.    All other systems reviewed and are negative.       Physical Exam  Temp:  [36.4 °C (97.5 °F)-37.6 °C (99.7 °F)] 36.4 °C (97.5 °F)  Pulse:  [] 93  Resp:  [12-22] 18  BP: ()/(50-73) 108/59  SpO2:  [92 %-98 %] 94 %    Physical Exam  Vitals reviewed.   Constitutional:       General: She is in acute distress (Due to pain).      Appearance: Normal appearance. She is ill-appearing.   HENT:      Head: Normocephalic and atraumatic.      Nose: No congestion.   Eyes:      General:         Right eye: No discharge.         Left eye: No discharge.      Pupils: Pupils are equal, round, and reactive to light.   Neck:      Comments: Neck swelling  Cardiovascular:      Rate and Rhythm: Normal rate and regular rhythm.      Pulses: Normal pulses.      Heart sounds: Normal heart  sounds. No murmur heard.  Pulmonary:      Effort: Pulmonary effort is normal. No respiratory distress.      Breath sounds: Normal breath sounds. No stridor.   Abdominal:      General: Bowel sounds are normal. There is no distension.      Palpations: Abdomen is soft.      Tenderness: There is no abdominal tenderness.   Musculoskeletal:         General: No swelling. Normal range of motion.      Cervical back: Tenderness present. No rigidity.   Lymphadenopathy:      Cervical: Cervical adenopathy present.   Skin:     General: Skin is warm.      Capillary Refill: Capillary refill takes less than 2 seconds.      Coloration: Skin is not jaundiced or pale.      Findings: No bruising.   Neurological:      General: No focal deficit present.      Mental Status: She is alert and oriented to person, place, and time.      Cranial Nerves: No cranial nerve deficit.   Psychiatric:         Mood and Affect: Mood normal.         Behavior: Behavior normal.         Fluids    Intake/Output Summary (Last 24 hours) at 8/19/2023 1725  Last data filed at 8/19/2023 1536  Gross per 24 hour   Intake 4178.12 ml   Output 2700 ml   Net 1478.12 ml         Laboratory  Recent Labs     08/18/23  0542 08/19/23  0530   WBC 31.1* 24.1*   RBC 3.98* 3.40*   HEMOGLOBIN 12.3 10.5*   HEMATOCRIT 37.8 32.1*   MCV 95.0 94.4   MCH 30.9 30.9   MCHC 32.5 32.7   RDW 45.1 45.8   PLATELETCT 279 246   MPV 11.2 11.0       Recent Labs     08/18/23  0542 08/19/23  0530   SODIUM 136 132*   POTASSIUM 3.7 3.9   CHLORIDE 99 101   CO2 22 22   GLUCOSE 110* 107*   BUN 8 4*   CREATININE 0.60 0.45*   CALCIUM 9.2 8.0*       Recent Labs     08/18/23  0840   INR 1.24*                 Imaging  CT-SOFT TISSUE NECK WITH   Final Result      1.  Probably mildly worsening extensive inflammatory changes within the superficial and deep left neck, with slightly increased retropharyngeal effusion and some new superior mesenteric fat stranding suspicious for mediastinitis.   2.  Asymmetric left  cervical lymph nodes which are presumably reactive with associated necrosis of upper left cervical lymph node.   3.  New mild thickening/edema of the epiglottis.   4.  Small bilateral pleural effusions.      CT-FOREIGN FILM CAT SCAN   Final Result             Assessment/Plan  * Sepsis (HCC)- (present on admission)  Assessment & Plan  This is Sepsis Present on admission  SIRS criteria identified on my evaluation include: Fever, with temperature greater than 100.9 deg F and Tachycardia, with heart rate greater than 90 BPM  Clinical indicators of end organ dysfunction include Lactic Acid greater than 2  Source is necrotic lymphadenopathy, neck cellultis  Sepsis protocol initiated  Crystalloid Fluid Administration: Fluid resuscitation ordered per standard protocol - 30 mL/kg per current or ideal body weight  IV antibiotics as appropriate for source of sepsis  Reassessment: I have reassessed the patient's hemodynamic status    Continue IV fluid for hydration as patient is n.p.o. pain  MRSA nares came back negative  I am continuing IV Unasyn  Follow culture results  I discussed plan of care with ENT for  Discussed plan of care with patient  Continue administration of pain control.    Cellulitis, neck  Assessment & Plan  MRSA nares came back negative vancomycin discontinued  Continue IV Unasyn  Follow culture results  ENT consulted patient is going for surgical intervention for source control.  She has been having severe neck pain.  I increase the frequency of IV Dilaudid to every 3 hourly despite increasing frequency she continued to have pain I ordered IV ketorolac for moderate pain.  Patient remained repeat CT scan that showed worsening.  I discussed plan of care with ENT.    Acute lactic acidosis  Assessment & Plan  Now resolved with IV fluids    Tobacco dependence  Assessment & Plan  Tobacco cessation education provided for more than 3 minutes, discussed options of nicotine patch, medical treatment with wellbutrin and  chantix. Discussed the risks of smoking including increased risk of heart disease, stroke, cancer and COPD      Lymphadenopathy  Assessment & Plan  CT at outside facility 23 x 15 mm left jugulodigastric lymphadenopathy with central hypoattenuation concerning for necrotic lymphadenopathy, multiple posterior cervical chain lymphadenopathy noted  Repeat CT scan showed worsening of inflammation pain  I reviewed CT scan finding with ENT surgeon Dr. Benson.  Patient is currently n.p.o.  Continue IV fluid for hydration.    Methamphetamine abuse (HCC)  Assessment & Plan  Encouraged sobriety        Holding pharmacological DVT prophylaxis as patient may need surgical intervention.    Additional Time spent: 12 minutes by reviewing CT scan with ENT surgeon Dr. Benson and discussed plan of care with him.  I discussed plan of care with bedside RN again.    VTE prophylaxis:   SCDs/TEDs      I have performed a physical exam and reviewed and updated ROS and Plan today (8/19/2023). In review of yesterday's note (8/18/2023), there are no changes except as documented above.

## 2023-08-20 NOTE — ANESTHESIA POSTPROCEDURE EVALUATION
Patient: Karlie Santos    Procedure Summary     Date: 08/19/23 Room / Location: [facility] 09 / SURGERY [facility]    Anesthesia Start: 1924 Anesthesia Stop: 2002    Procedure: INCISION & DRAINAGE, RETROPHYARYNGEAL ABSCESS (Mouth) Diagnosis: (retropharyngeal abscess)    Surgeons: Maverick Benson M.D. Responsible Provider: Felzi Austin D.O.    Anesthesia Type: general ASA Status: 4          Final Anesthesia Type: general  Last vitals  BP   Blood Pressure: 100/62    Temp   36.6 °C (97.8 °F)    Pulse   78   Resp   18    SpO2   93 %      Anesthesia Post Evaluation    Patient location during evaluation: PACU  Patient participation: complete - patient participated  Level of consciousness: awake and alert  Pain score: 5    Airway patency: patent  Anesthetic complications: no  Cardiovascular status: hemodynamically stable  Respiratory status: acceptable  Hydration status: euvolemic    PONV: none          No notable events documented.     Nurse Pain Score: 7 (NPRS)

## 2023-08-20 NOTE — OP REPORT
DATE OF SERVICE:  8/19/23     SURGEON: Maverick Benson MD     ANESTHESIOLOGIST: Abel Austin MD     PREOPERATIVE DIAGNOSES:  1.  Retropharyngeal abscess     POSTOPERATIVE DIAGNOSES:  1.  Retropharyngeal abscess     PROCEDURE PERFORMED:    Transoral incision and drainage of retropharyngeal abscess  Aspiration of L neck abscess     INDICATIONS FOR PROCEDURE:  5 days of worsening neck pain. CT today showed increased size of a retropharyngeal abscess, compared to outside CT two days ago. Risks, benefits and alternatives were discussed with the parents who gave their informed consent.     OPERATIVE FINDINGS:  1cc of pus aspirated from the L neck. No pus in the retropharynx     PROCEDURE IN DETAIL:  The patient was identified in preoperative holding area and brought to the operating room.  They were intubated without difficulty.  A timeout was satisfactorily completed.  A McIvor mouth gag was placed.  The left neck was cleaned with alcohol and injected with 0.5 cc of half percent Marcaine with epinephrine.  An 18-gauge needle was used to aspirate 1 cc of purulence from the left neck.  Next the retropharynx was inspected and 0.5 cc of the same anesthetic was injected.  A vertical incision was made with a 15 blade scalpel and a tonsil clamp was used to probe the retropharynx.  No purulence returned.  The patient was extubated and brought to the postanesthesia care unit in good condition.     ESTIMATED BLOOD LOSS:  1 mL.     COMPLICATIONS:  None.     SPECIMENS REMOVED: Left neck aspirate for fluid culture

## 2023-08-20 NOTE — ANESTHESIA TIME REPORT
Anesthesia Start and Stop Event Times     Date Time Event    8/19/2023 1909 Ready for Procedure     1924 Anesthesia Start     2002 Anesthesia Stop        Responsible Staff  08/19/23    Name Role Begin End    Feliz Austin D.O. Anesth 1924 2002        Overtime Reason:  no overtime (within assigned shift)    Comments:

## 2023-08-21 ENCOUNTER — ANESTHESIA EVENT (OUTPATIENT)
Dept: SURGERY | Facility: MEDICAL CENTER | Age: 28
DRG: 854 | End: 2023-08-21
Payer: MEDICAID

## 2023-08-21 ENCOUNTER — ANESTHESIA (OUTPATIENT)
Dept: SURGERY | Facility: MEDICAL CENTER | Age: 28
DRG: 854 | End: 2023-08-21
Payer: MEDICAID

## 2023-08-21 ENCOUNTER — APPOINTMENT (OUTPATIENT)
Dept: RADIOLOGY | Facility: MEDICAL CENTER | Age: 28
DRG: 854 | End: 2023-08-21
Payer: MEDICAID

## 2023-08-21 PROBLEM — A41.9 SEPSIS (HCC): Status: RESOLVED | Noted: 2023-08-18 | Resolved: 2023-08-21

## 2023-08-21 LAB
ANION GAP SERPL CALC-SCNC: 8 MMOL/L (ref 7–16)
BUN SERPL-MCNC: 8 MG/DL (ref 8–22)
CALCIUM SERPL-MCNC: 8.5 MG/DL (ref 8.5–10.5)
CHLORIDE SERPL-SCNC: 106 MMOL/L (ref 96–112)
CO2 SERPL-SCNC: 24 MMOL/L (ref 20–33)
CREAT SERPL-MCNC: 0.41 MG/DL (ref 0.5–1.4)
ERYTHROCYTE [DISTWIDTH] IN BLOOD BY AUTOMATED COUNT: 45.2 FL (ref 35.9–50)
GFR SERPLBLD CREATININE-BSD FMLA CKD-EPI: 139 ML/MIN/1.73 M 2
GLUCOSE SERPL-MCNC: 104 MG/DL (ref 65–99)
HCT VFR BLD AUTO: 29.6 % (ref 37–47)
HGB BLD-MCNC: 9.6 G/DL (ref 12–16)
MCH RBC QN AUTO: 30.2 PG (ref 27–33)
MCHC RBC AUTO-ENTMCNC: 32.4 G/DL (ref 32.2–35.5)
MCV RBC AUTO: 93.1 FL (ref 81.4–97.8)
PLATELET # BLD AUTO: 258 K/UL (ref 164–446)
PMV BLD AUTO: 10.1 FL (ref 9–12.9)
POTASSIUM SERPL-SCNC: 3.7 MMOL/L (ref 3.6–5.5)
RBC # BLD AUTO: 3.18 M/UL (ref 4.2–5.4)
SODIUM SERPL-SCNC: 138 MMOL/L (ref 135–145)
WBC # BLD AUTO: 13.2 K/UL (ref 4.8–10.8)

## 2023-08-21 PROCEDURE — 700101 HCHG RX REV CODE 250: Performed by: OTOLARYNGOLOGY

## 2023-08-21 PROCEDURE — RXMED WILLOW AMBULATORY MEDICATION CHARGE

## 2023-08-21 PROCEDURE — 700111 HCHG RX REV CODE 636 W/ 250 OVERRIDE (IP): Performed by: ANESTHESIOLOGY

## 2023-08-21 PROCEDURE — 160009 HCHG ANES TIME/MIN: Performed by: OTOLARYNGOLOGY

## 2023-08-21 PROCEDURE — 700101 HCHG RX REV CODE 250: Performed by: ANESTHESIOLOGY

## 2023-08-21 PROCEDURE — 160048 HCHG OR STATISTICAL LEVEL 1-5: Performed by: OTOLARYNGOLOGY

## 2023-08-21 PROCEDURE — 70491 CT SOFT TISSUE NECK W/DYE: CPT

## 2023-08-21 PROCEDURE — 99233 SBSQ HOSP IP/OBS HIGH 50: CPT | Mod: GC | Performed by: INTERNAL MEDICINE

## 2023-08-21 PROCEDURE — 700111 HCHG RX REV CODE 636 W/ 250 OVERRIDE (IP): Mod: JZ | Performed by: STUDENT IN AN ORGANIZED HEALTH CARE EDUCATION/TRAINING PROGRAM

## 2023-08-21 PROCEDURE — 0K9300Z DRAINAGE OF LEFT NECK MUSCLE WITH DRAINAGE DEVICE, OPEN APPROACH: ICD-10-PCS | Performed by: OTOLARYNGOLOGY

## 2023-08-21 PROCEDURE — A9270 NON-COVERED ITEM OR SERVICE: HCPCS | Performed by: INTERNAL MEDICINE

## 2023-08-21 PROCEDURE — 36415 COLL VENOUS BLD VENIPUNCTURE: CPT

## 2023-08-21 PROCEDURE — 85027 COMPLETE CBC AUTOMATED: CPT

## 2023-08-21 PROCEDURE — A9270 NON-COVERED ITEM OR SERVICE: HCPCS | Performed by: ANESTHESIOLOGY

## 2023-08-21 PROCEDURE — 700102 HCHG RX REV CODE 250 W/ 637 OVERRIDE(OP): Performed by: ANESTHESIOLOGY

## 2023-08-21 PROCEDURE — 160027 HCHG SURGERY MINUTES - 1ST 30 MINS LEVEL 2: Performed by: OTOLARYNGOLOGY

## 2023-08-21 PROCEDURE — 160035 HCHG PACU - 1ST 60 MINS PHASE I: Performed by: OTOLARYNGOLOGY

## 2023-08-21 PROCEDURE — 80048 BASIC METABOLIC PNL TOTAL CA: CPT

## 2023-08-21 PROCEDURE — 700105 HCHG RX REV CODE 258: Performed by: STUDENT IN AN ORGANIZED HEALTH CARE EDUCATION/TRAINING PROGRAM

## 2023-08-21 PROCEDURE — 700102 HCHG RX REV CODE 250 W/ 637 OVERRIDE(OP): Performed by: INTERNAL MEDICINE

## 2023-08-21 PROCEDURE — C1729 CATH, DRAINAGE: HCPCS | Performed by: OTOLARYNGOLOGY

## 2023-08-21 PROCEDURE — 700111 HCHG RX REV CODE 636 W/ 250 OVERRIDE (IP): Mod: JZ | Performed by: INTERNAL MEDICINE

## 2023-08-21 PROCEDURE — 700105 HCHG RX REV CODE 258: Performed by: INTERNAL MEDICINE

## 2023-08-21 PROCEDURE — 770006 HCHG ROOM/CARE - MED/SURG/GYN SEMI*

## 2023-08-21 PROCEDURE — 700102 HCHG RX REV CODE 250 W/ 637 OVERRIDE(OP): Performed by: STUDENT IN AN ORGANIZED HEALTH CARE EDUCATION/TRAINING PROGRAM

## 2023-08-21 PROCEDURE — 160002 HCHG RECOVERY MINUTES (STAT): Performed by: OTOLARYNGOLOGY

## 2023-08-21 PROCEDURE — 160038 HCHG SURGERY MINUTES - EA ADDL 1 MIN LEVEL 2: Performed by: OTOLARYNGOLOGY

## 2023-08-21 PROCEDURE — 700117 HCHG RX CONTRAST REV CODE 255

## 2023-08-21 PROCEDURE — 700105 HCHG RX REV CODE 258: Mod: JZ | Performed by: ANESTHESIOLOGY

## 2023-08-21 PROCEDURE — A9270 NON-COVERED ITEM OR SERVICE: HCPCS | Performed by: STUDENT IN AN ORGANIZED HEALTH CARE EDUCATION/TRAINING PROGRAM

## 2023-08-21 RX ORDER — HALOPERIDOL 5 MG/ML
1 INJECTION INTRAMUSCULAR
Status: DISCONTINUED | OUTPATIENT
Start: 2023-08-21 | End: 2023-08-21 | Stop reason: HOSPADM

## 2023-08-21 RX ORDER — BUPIVACAINE HYDROCHLORIDE 5 MG/ML
INJECTION, SOLUTION EPIDURAL; INTRACAUDAL
Status: DISCONTINUED | OUTPATIENT
Start: 2023-08-21 | End: 2023-08-21 | Stop reason: HOSPADM

## 2023-08-21 RX ORDER — POLYETHYLENE GLYCOL 3350 17 G/17G
1 POWDER, FOR SOLUTION ORAL
Status: DISCONTINUED | OUTPATIENT
Start: 2023-08-21 | End: 2023-08-25 | Stop reason: HOSPADM

## 2023-08-21 RX ORDER — METOCLOPRAMIDE HYDROCHLORIDE 5 MG/ML
INJECTION INTRAMUSCULAR; INTRAVENOUS PRN
Status: DISCONTINUED | OUTPATIENT
Start: 2023-08-21 | End: 2023-08-21 | Stop reason: SURG

## 2023-08-21 RX ORDER — HYDRALAZINE HYDROCHLORIDE 20 MG/ML
5 INJECTION INTRAMUSCULAR; INTRAVENOUS
Status: DISCONTINUED | OUTPATIENT
Start: 2023-08-21 | End: 2023-08-21 | Stop reason: HOSPADM

## 2023-08-21 RX ORDER — MIDAZOLAM HYDROCHLORIDE 1 MG/ML
INJECTION INTRAMUSCULAR; INTRAVENOUS PRN
Status: DISCONTINUED | OUTPATIENT
Start: 2023-08-21 | End: 2023-08-21 | Stop reason: SURG

## 2023-08-21 RX ORDER — HYDROMORPHONE HYDROCHLORIDE 1 MG/ML
0.2 INJECTION, SOLUTION INTRAMUSCULAR; INTRAVENOUS; SUBCUTANEOUS
Status: DISCONTINUED | OUTPATIENT
Start: 2023-08-21 | End: 2023-08-21 | Stop reason: HOSPADM

## 2023-08-21 RX ORDER — EPHEDRINE SULFATE 50 MG/ML
5 INJECTION, SOLUTION INTRAVENOUS
Status: DISCONTINUED | OUTPATIENT
Start: 2023-08-21 | End: 2023-08-21 | Stop reason: HOSPADM

## 2023-08-21 RX ORDER — AMOXICILLIN 250 MG
2 CAPSULE ORAL 2 TIMES DAILY PRN
Status: DISCONTINUED | OUTPATIENT
Start: 2023-08-21 | End: 2023-08-25 | Stop reason: HOSPADM

## 2023-08-21 RX ORDER — DEXAMETHASONE SODIUM PHOSPHATE 4 MG/ML
INJECTION, SOLUTION INTRA-ARTICULAR; INTRALESIONAL; INTRAMUSCULAR; INTRAVENOUS; SOFT TISSUE PRN
Status: DISCONTINUED | OUTPATIENT
Start: 2023-08-21 | End: 2023-08-21 | Stop reason: SURG

## 2023-08-21 RX ORDER — MEPERIDINE HYDROCHLORIDE 25 MG/ML
12.5 INJECTION INTRAMUSCULAR; INTRAVENOUS; SUBCUTANEOUS
Status: DISCONTINUED | OUTPATIENT
Start: 2023-08-21 | End: 2023-08-21 | Stop reason: HOSPADM

## 2023-08-21 RX ORDER — MIDAZOLAM HYDROCHLORIDE 1 MG/ML
1 INJECTION INTRAMUSCULAR; INTRAVENOUS
Status: DISCONTINUED | OUTPATIENT
Start: 2023-08-21 | End: 2023-08-21 | Stop reason: HOSPADM

## 2023-08-21 RX ORDER — HYDROMORPHONE HYDROCHLORIDE 1 MG/ML
0.4 INJECTION, SOLUTION INTRAMUSCULAR; INTRAVENOUS; SUBCUTANEOUS
Status: DISCONTINUED | OUTPATIENT
Start: 2023-08-21 | End: 2023-08-21 | Stop reason: HOSPADM

## 2023-08-21 RX ORDER — SODIUM CHLORIDE, SODIUM LACTATE, POTASSIUM CHLORIDE, CALCIUM CHLORIDE 600; 310; 30; 20 MG/100ML; MG/100ML; MG/100ML; MG/100ML
INJECTION, SOLUTION INTRAVENOUS
Status: DISCONTINUED | OUTPATIENT
Start: 2023-08-21 | End: 2023-08-21 | Stop reason: SURG

## 2023-08-21 RX ORDER — SODIUM CHLORIDE, SODIUM LACTATE, POTASSIUM CHLORIDE, CALCIUM CHLORIDE 600; 310; 30; 20 MG/100ML; MG/100ML; MG/100ML; MG/100ML
INJECTION, SOLUTION INTRAVENOUS CONTINUOUS
Status: DISCONTINUED | OUTPATIENT
Start: 2023-08-21 | End: 2023-08-21 | Stop reason: HOSPADM

## 2023-08-21 RX ORDER — CYCLOBENZAPRINE HCL 5 MG
5 TABLET ORAL 3 TIMES DAILY PRN
Qty: 30 TABLET | Refills: 0 | Status: SHIPPED | OUTPATIENT
Start: 2023-08-21

## 2023-08-21 RX ORDER — HYDROMORPHONE HYDROCHLORIDE 1 MG/ML
0.1 INJECTION, SOLUTION INTRAMUSCULAR; INTRAVENOUS; SUBCUTANEOUS
Status: DISCONTINUED | OUTPATIENT
Start: 2023-08-21 | End: 2023-08-21 | Stop reason: HOSPADM

## 2023-08-21 RX ORDER — DIPHENHYDRAMINE HYDROCHLORIDE 50 MG/ML
12.5 INJECTION INTRAMUSCULAR; INTRAVENOUS
Status: DISCONTINUED | OUTPATIENT
Start: 2023-08-21 | End: 2023-08-21 | Stop reason: HOSPADM

## 2023-08-21 RX ORDER — OXYCODONE HYDROCHLORIDE 5 MG/1
5 TABLET ORAL EVERY 4 HOURS PRN
Qty: 18 TABLET | Refills: 0 | Status: SHIPPED | OUTPATIENT
Start: 2023-08-21 | End: 2023-08-28

## 2023-08-21 RX ORDER — MAGNESIUM HYDROXIDE 1200 MG/15ML
LIQUID ORAL
Status: COMPLETED | OUTPATIENT
Start: 2023-08-21 | End: 2023-08-21

## 2023-08-21 RX ORDER — HYDROMORPHONE HYDROCHLORIDE 2 MG/ML
INJECTION, SOLUTION INTRAMUSCULAR; INTRAVENOUS; SUBCUTANEOUS PRN
Status: DISCONTINUED | OUTPATIENT
Start: 2023-08-21 | End: 2023-08-21 | Stop reason: SURG

## 2023-08-21 RX ORDER — LABETALOL HYDROCHLORIDE 5 MG/ML
5 INJECTION, SOLUTION INTRAVENOUS
Status: DISCONTINUED | OUTPATIENT
Start: 2023-08-21 | End: 2023-08-21 | Stop reason: HOSPADM

## 2023-08-21 RX ORDER — BISACODYL 10 MG
10 SUPPOSITORY, RECTAL RECTAL
Status: DISCONTINUED | OUTPATIENT
Start: 2023-08-21 | End: 2023-08-25 | Stop reason: HOSPADM

## 2023-08-21 RX ORDER — AMOXICILLIN AND CLAVULANATE POTASSIUM 875; 125 MG/1; MG/1
1 TABLET, FILM COATED ORAL 2 TIMES DAILY
Qty: 26 TABLET | Refills: 0 | Status: ACTIVE | OUTPATIENT
Start: 2023-08-21 | End: 2023-09-03

## 2023-08-21 RX ORDER — KETOROLAC TROMETHAMINE 30 MG/ML
INJECTION, SOLUTION INTRAMUSCULAR; INTRAVENOUS PRN
Status: DISCONTINUED | OUTPATIENT
Start: 2023-08-21 | End: 2023-08-21 | Stop reason: SURG

## 2023-08-21 RX ORDER — ONDANSETRON 2 MG/ML
INJECTION INTRAMUSCULAR; INTRAVENOUS PRN
Status: DISCONTINUED | OUTPATIENT
Start: 2023-08-21 | End: 2023-08-21 | Stop reason: SURG

## 2023-08-21 RX ORDER — ONDANSETRON 2 MG/ML
4 INJECTION INTRAMUSCULAR; INTRAVENOUS
Status: DISCONTINUED | OUTPATIENT
Start: 2023-08-21 | End: 2023-08-21 | Stop reason: HOSPADM

## 2023-08-21 RX ADMIN — OXYCODONE HYDROCHLORIDE 5 MG: 5 TABLET ORAL at 10:57

## 2023-08-21 RX ADMIN — ONDANSETRON 4 MG: 2 INJECTION INTRAMUSCULAR; INTRAVENOUS at 17:56

## 2023-08-21 RX ADMIN — AMPICILLIN AND SULBACTAM 3 G: 1; 2 INJECTION, POWDER, FOR SOLUTION INTRAMUSCULAR; INTRAVENOUS at 23:25

## 2023-08-21 RX ADMIN — IOHEXOL 55 ML: 350 INJECTION, SOLUTION INTRAVENOUS at 10:15

## 2023-08-21 RX ADMIN — SODIUM CHLORIDE, POTASSIUM CHLORIDE, SODIUM LACTATE AND CALCIUM CHLORIDE: 600; 310; 30; 20 INJECTION, SOLUTION INTRAVENOUS at 17:13

## 2023-08-21 RX ADMIN — ACETAMINOPHEN 650 MG: 325 TABLET, FILM COATED ORAL at 07:33

## 2023-08-21 RX ADMIN — SUGAMMADEX 200 MG: 100 INJECTION, SOLUTION INTRAVENOUS at 18:00

## 2023-08-21 RX ADMIN — AMPICILLIN AND SULBACTAM 3 G: 1; 2 INJECTION, POWDER, FOR SOLUTION INTRAMUSCULAR; INTRAVENOUS at 13:00

## 2023-08-21 RX ADMIN — KETOROLAC TROMETHAMINE 30 MG: 30 INJECTION, SOLUTION INTRAMUSCULAR; INTRAVENOUS at 07:33

## 2023-08-21 RX ADMIN — SODIUM CHLORIDE: 900 INJECTION INTRAVENOUS at 10:58

## 2023-08-21 RX ADMIN — OXYCODONE HYDROCHLORIDE 5 MG: 5 TABLET ORAL at 01:47

## 2023-08-21 RX ADMIN — SODIUM CHLORIDE: 900 INJECTION INTRAVENOUS at 20:40

## 2023-08-21 RX ADMIN — Medication 100 MG: at 17:20

## 2023-08-21 RX ADMIN — SENNOSIDES AND DOCUSATE SODIUM 2 TABLET: 50; 8.6 TABLET ORAL at 05:27

## 2023-08-21 RX ADMIN — DEXAMETHASONE SODIUM PHOSPHATE 8 MG: 4 INJECTION INTRA-ARTICULAR; INTRALESIONAL; INTRAMUSCULAR; INTRAVENOUS; SOFT TISSUE at 17:23

## 2023-08-21 RX ADMIN — SODIUM CHLORIDE: 900 INJECTION INTRAVENOUS at 05:27

## 2023-08-21 RX ADMIN — OXYCODONE HYDROCHLORIDE 5 MG: 5 TABLET ORAL at 05:27

## 2023-08-21 RX ADMIN — ROCURONIUM BROMIDE 30 MG: 10 INJECTION, SOLUTION INTRAVENOUS at 17:23

## 2023-08-21 RX ADMIN — HYDROCODONE BITARTRATE AND ACETAMINOPHEN 7.5 MG: 7.5; 325 SOLUTION ORAL at 18:31

## 2023-08-21 RX ADMIN — FENTANYL CITRATE 100 MCG: 50 INJECTION, SOLUTION INTRAMUSCULAR; INTRAVENOUS at 17:15

## 2023-08-21 RX ADMIN — MIDAZOLAM 2 MG: 1 INJECTION, SOLUTION INTRAMUSCULAR; INTRAVENOUS at 17:13

## 2023-08-21 RX ADMIN — PROPOFOL 150 MG: 10 INJECTION, EMULSION INTRAVENOUS at 17:19

## 2023-08-21 RX ADMIN — HYDROMORPHONE HYDROCHLORIDE 1 MG: 2 INJECTION INTRAMUSCULAR; INTRAVENOUS; SUBCUTANEOUS at 17:26

## 2023-08-21 RX ADMIN — KETOROLAC TROMETHAMINE 30 MG: 30 INJECTION, SOLUTION INTRAMUSCULAR; INTRAVENOUS at 17:57

## 2023-08-21 RX ADMIN — METOCLOPRAMIDE 10 MG: 5 INJECTION, SOLUTION INTRAMUSCULAR; INTRAVENOUS at 17:13

## 2023-08-21 RX ADMIN — AMPICILLIN AND SULBACTAM 3 G: 1; 2 INJECTION, POWDER, FOR SOLUTION INTRAMUSCULAR; INTRAVENOUS at 05:30

## 2023-08-21 ASSESSMENT — ENCOUNTER SYMPTOMS
SEIZURES: 0
LOSS OF CONSCIOUSNESS: 0
PALPITATIONS: 0
COUGH: 0
STRIDOR: 0
FEVER: 0
ABDOMINAL PAIN: 0
SHORTNESS OF BREATH: 0
SORE THROAT: 0
INSOMNIA: 0

## 2023-08-21 ASSESSMENT — PAIN SCALES - GENERAL: PAIN_LEVEL: 5

## 2023-08-21 ASSESSMENT — PAIN DESCRIPTION - PAIN TYPE
TYPE: SURGICAL PAIN
TYPE: ACUTE PAIN
TYPE: ACUTE PAIN

## 2023-08-21 ASSESSMENT — FIBROSIS 4 INDEX: FIB4 SCORE: 0.53

## 2023-08-21 NOTE — HOSPITAL COURSE
Karlie Mccabe is a 28 year old female with history of methamphetamine and tobacco abuse who was transferred here on 8/18/23 for ENT consultation for neck abscess. CT soft tissue neck showed mildly worsening extensive inflammatory changes within the superficial and deep left neck with slightly increased retropharyngeal effusion, new superior mesenteric fat stranding suspicious for mediastinitis, necrosis of upper left cervical lymph node, and new mild thickening/edema of the epiglottis. Wound cultures grew strep pyogenes. ENT was consulted and patient underwent three I&Ds of the abscess and received IV Unasyn. Her swelling and pain has greatly improved. She is talking normally and eating and drinking without difficulty. Patient was counseled on methamphetamine and tobacco cessation. At this time, she is stable for discharge home on oral Augmentin.

## 2023-08-21 NOTE — PROGRESS NOTES
[facility] Internal Medicine Daily Progress Note    Date of Service  8/21/2023    [facility] Team: R IM Blue Team   Attending: Perfecto Ramirez M.d.  Senior Resident: Dr. Naveed Hernandez  Intern:  Dr. Rio Walton  Contact Number: [phone]    Chief Complaint  Karlie Santos is a 28 y.o. female admitted 8/18/2023 with retropharyngeal abscess    Hospital Course  Karlie Mccabe is a 28 year old female with history of methamphetamine and tobacco abuse who was transferred here on 8/18/23 for ENT consultation for neck abscess. CT soft tissue neck showed mildly worsening extensive inflammatory changes within the superficial and deep left neck with slightly increased retropharyngeal effusion, new superior mesenteric fat stranding suspicious for mediastinitis, necrosis of upper left cervical lymph node, and new mild thickening/edema of the epiglottis. Patient met sepsis criteria with tachycardia 115 and leukocytosis WBC 31. She received fluids, ceftriaxone, and vancomycin. Patient underwent I&D of the abscess on 8/19 and is on IV Unasyn. Patient had worsening swelling post op day 2 and repeat CT on 8/21 showed extensive suppurative adenopathy and abscess formation in left side of neck. Patient will be undergoing lateral approach I&D of left neck today.     Interval Problem Update  Patient has worsening swelling and pain of her neck.  Repeat CT soft tissue shows a focal area of abscess formation in the left side of the neck.  ENT following. Will perform lateral approach I&D of left neck this afternoon.    I have discussed this patient's plan of care and discharge plan at IDT rounds today with Case Management, Nursing, Nursing leadership, and other members of the IDT team.    Consultants/Specialty  Otolaryngology    Code Status  Full Code    Disposition  Medically Cleared  I have placed the appropriate orders for post-discharge needs.    Review of Systems  Review of Systems   Constitutional:  Negative for fever and malaise/fatigue.   HENT:   Negative for sore throat.    Respiratory:  Negative for cough, shortness of breath and stridor.    Cardiovascular:  Negative for chest pain, palpitations and leg swelling.   Gastrointestinal:  Negative for abdominal pain.   Neurological:  Negative for seizures and loss of consciousness.   Psychiatric/Behavioral:  The patient does not have insomnia.         Physical Exam  Temp:  [36.4 °C (97.5 °F)-36.8 °C (98.2 °F)] 36.6 °C (97.8 °F)  Pulse:  [67-86] 86  Resp:  [15-18] 16  BP: ()/(49-70) 96/69  SpO2:  [90 %-94 %] 90 %    Physical Exam  Vitals and nursing note reviewed.   Constitutional:       General: She is not in acute distress.     Appearance: Normal appearance. She is normal weight.   HENT:      Head: Normocephalic and atraumatic.      Comments: Neck incision is tender with worsening edema      Right Ear: External ear normal.      Left Ear: External ear normal.   Cardiovascular:      Rate and Rhythm: Normal rate and regular rhythm.      Pulses: Normal pulses.      Heart sounds: Normal heart sounds. No murmur heard.  Pulmonary:      Effort: Pulmonary effort is normal. No respiratory distress.   Abdominal:      General: Abdomen is flat. There is no distension.   Musculoskeletal:         General: No swelling, deformity or signs of injury.   Skin:     Coloration: Skin is not jaundiced.   Neurological:      General: No focal deficit present.      Mental Status: She is alert and oriented to person, place, and time. Mental status is at baseline.   Psychiatric:         Mood and Affect: Mood normal.         Thought Content: Thought content normal.         Fluids    Intake/Output Summary (Last 24 hours) at 8/21/2023 1522  Last data filed at 8/21/2023 0600  Gross per 24 hour   Intake 1860 ml   Output 0 ml   Net 1860 ml       Laboratory  Recent Labs     08/19/23  0530 08/20/23  0142 08/21/23  0317   WBC 24.1* 20.8* 13.2*   RBC 3.40* 3.74* 3.18*   HEMOGLOBIN 10.5* 11.2* 9.6*   HEMATOCRIT 32.1* 34.7* 29.6*   MCV 94.4  92.8 93.1   MCH 30.9 29.9 30.2   MCHC 32.7 32.3 32.4   RDW 45.8 45.6 45.2   PLATELETCT 246 264 258   MPV 11.0 10.8 10.1     Recent Labs     08/19/23  0530 08/20/23  0142 08/21/23  0317   SODIUM 132* 136 138   POTASSIUM 3.9 3.9 3.7   CHLORIDE 101 100 106   CO2 22 26 24   GLUCOSE 107* 106* 104*   BUN 4* 5* 8   CREATININE 0.45* 0.37* 0.41*   CALCIUM 8.0* 8.6 8.5                     Imaging  CT-SOFT TISSUE NECK WITH   Final Result         1.  Extensive suppurative adenopathy along the internal jugular chain of nodes.      2.  3.2 x 1.8 x 3.1 cm focal area of abscess formation in the left side of the neck in the region of suppurtive adenopathy at level 2. Surgical incision and drainage should be considered.      3.  Multifocal groundglass opacity in the upper lobes bilaterally suspicious for pneumonitis possibly Covid.      CT-SOFT TISSUE NECK WITH   Final Result      1.  Probably mildly worsening extensive inflammatory changes within the superficial and deep left neck, with slightly increased retropharyngeal effusion and some new superior mesenteric fat stranding suspicious for mediastinitis.   2.  Asymmetric left cervical lymph nodes which are presumably reactive with associated necrosis of upper left cervical lymph node.   3.  New mild thickening/edema of the epiglottis.   4.  Small bilateral pleural effusions.      CT-FOREIGN FILM CAT SCAN   Final Result           Assessment/Plan  Problem Representation:    Retropharyngeal abscess  Assessment & Plan  MRSA nares came back negative-vancomycin discontinued  Wound cx reveals gram(+) cocci. Pending final results  S/p neck I&D on 8/19 with ENT  Worsening swelling today  Repeat CT shows worsening of abscess  -Continue IV Unasyn  -Plan for another I&D today  -pain management with oxy 5 mg and dilaudid         Acute lactic acidosis  Assessment & Plan  Now resolved with IV fluids    Tobacco dependence  Assessment & Plan  Tobacco cessation education provided for more than 3  minutes, discussed options of nicotine patch, medical treatment with wellbutrin and chantix. Discussed the risks of smoking including increased risk of heart disease, stroke, cancer and COPD      Lymphadenopathy  Assessment & Plan  CT at outside facility 23 x 15 mm left jugulodigastric lymphadenopathy with central hypoattenuation concerning for necrotic lymphadenopathy, multiple posterior cervical chain lymphadenopathy noted.   Status post drainage of retropharyngeal abscess.   CT shows worsening lymphadenopathy  -continue IV Unasyn  -Plan for another I&D       Methamphetamine abuse (HCC)  Assessment & Plan  Encouraged sobriety          VTE prophylaxis: pharmacologic prophylaxis contraindicated due to potential need for repeat incision and drainage.    I have performed a physical exam and reviewed and updated ROS and Plan today (8/21/2023). In review of yesterday's note (8/20/2023), there are no changes except as documented above.

## 2023-08-21 NOTE — PROGRESS NOTES
4 Eyes Skin Assessment Completed by JONO cheng and JONO sequeira.    Head WDL  Ears WDL  Nose WDL  Mouth slight swelling  Neck swelling  Breast/Chest WDL  Shoulder Blades WDL  Spine WDL  (R) Arm/Elbow/Hand   WDL  (L) Arm/Elbow/Hand WDL  Abdomen WDL  Groin WDL  Scrotum/Coccyx/Buttocks WDL  (R) Leg WDL  (L) Leg WDL  (R) Heel/Foot/Toe Redness  (L) Heel/Foot/Toe WDL          Devices In Places Blood Pressure Cuff      Interventions In Place N/A    Possible Skin Injury No    Pictures Uploaded Into Epic N/A  Wound Consult Placed N/A  RN Wound Prevention Protocol Ordered No

## 2023-08-21 NOTE — PROGRESS NOTES
Received pt. In bed awake and oriented x4, VS with normal limits. Pt. Reports pain 8/10. Pain medication was given, provided comfort measures, ensure safety bed in locked and lowest position, call light is within reach, hourly round done.

## 2023-08-21 NOTE — CARE PLAN
The patient is Stable - Low risk of patient condition declining or worsening     Shift Goals  Clinical Goals: Pain management  Patient Goals: Sleep  Family Goals: LATONYA      Problem: Knowledge Deficit - Standard  Goal: Patient and family/care givers will demonstrate understanding of plan of care, disease process/condition, diagnostic tests and medications  Outcome: Progressing     Problem: Pain - Standard  Goal: Alleviation of pain or a reduction in pain to the patient’s comfort goal  Outcome: Progressing       Patient is not progressing towards the following goals:

## 2023-08-21 NOTE — PROGRESS NOTES
"Otolaryngology Progress Note    ID: Karlie Santos is a 28 y.o. female admitted for L neck infection    S: Felt worse this morning, CT ordered    O:  BP 96/69   Pulse 86   Temp 36.6 °C (97.8 °F) (Temporal)   Resp 16   Ht 1.702 m (5' 7\")   Wt 73.6 kg (162 lb 4.1 oz)   SpO2 90%   Gen: uncomfortable again  Pulm: Breathing comfortably on RA without stridor or stertor  Face: symmetric, no edema, facial strength intact bilaterally  Neck: swollen L neck    Recent Labs     08/19/23  0530 08/20/23  0142 08/21/23  0317   WBC 24.1* 20.8* 13.2*   RBC 3.40* 3.74* 3.18*   HEMOGLOBIN 10.5* 11.2* 9.6*   HEMATOCRIT 32.1* 34.7* 29.6*   MCV 94.4 92.8 93.1   MCH 30.9 29.9 30.2   MCHC 32.7 32.3 32.4   RDW 45.8 45.6 45.2   PLATELETCT 246 264 258   MPV 11.0 10.8 10.1     Recent Labs     08/19/23  0530 08/20/23  0142 08/21/23  0317   SODIUM 132* 136 138   POTASSIUM 3.9 3.9 3.7   CHLORIDE 101 100 106   CO2 22 26 24   GLUCOSE 107* 106* 104*   BUN 4* 5* 8   CREATININE 0.45* 0.37* 0.41*   CALCIUM 8.0* 8.6 8.5     CT neck 8/21:   IMPRESSION:  1.  Extensive suppurative adenopathy along the internal jugular chain of nodes.  2.  3.2 x 1.8 x 3.1 cm focal area of abscess formation in the left side of the neck in the region of suppurtive adenopathy at level 2. Surgical incision and drainage should be considered.  3.  Multifocal groundglass opacity in the upper lobes bilaterally suspicious for pneumonitis possibly Covid.    A/P:Karlie Santos is a 28 y.o. female with continued L neck infection, now a true abscess on CT. Will plan for a lateral approach, L neck I&D this afternoon. Discussed plan with patient.     Please don't hesitate to call with questions or concerns.    Maverick Benson M.D.  [phone]   "

## 2023-08-21 NOTE — CARE PLAN
The patient is Watcher - Medium risk of patient condition declining or worsening    Shift Goals  Clinical Goals: IV ABX, Neck swelling/airway  Patient Goals: Rest/recovery  Family Goals: cornel    Problem: Urinary - Renal Perfusion  Goal: Ability to achieve and maintain adequate renal perfusion and functioning will improve  Outcome: Progressing  Note: Pt ambulates to bathroom gait steady no assist needed. Voids in toilet      Progress made toward(s) clinical / shift goals:        Problem: Pain - Standard  Goal: Alleviation of pain or a reduction in pain to the patient’s comfort goal  Outcome: Progressing  Note: Pain/discomfort managed with cyclobenzaprine 5 mg and Tylenol 650 mg       Problem: Respiratory  Goal: Patient will achieve/maintain optimum respiratory ventilation and gas exchange  Outcome: Progressing  Note: Pt continues on RA with no s/sx of sob at rest or during ambulation.       Problem: Fluid Volume  Goal: Fluid volume balance will be maintained  Outcome: Progressing  Note:  NS infusion 125 mL/hr continuous/Unasyn 3 g/100 mL q 6hrs. Oral fluids 360 cc      Problem: Physical Regulation  Goal: Diagnostic test results will improve  Outcome: Progressing  Note: WBC trending down 20.8 from 24.1.     Left side of Pt neck swollen but she is able to move her head and neck. No difficulty swallowing noted throughout the night.

## 2023-08-21 NOTE — PROGRESS NOTES
Report called to Darshana DE LA CRUZ. Pt left floor via bed. IV infusing  mL/hr. Pt comfortable at the time no report of pain.

## 2023-08-22 ENCOUNTER — APPOINTMENT (OUTPATIENT)
Dept: RADIOLOGY | Facility: MEDICAL CENTER | Age: 28
DRG: 854 | End: 2023-08-22
Payer: MEDICAID

## 2023-08-22 LAB
ALBUMIN SERPL BCP-MCNC: 3.1 G/DL (ref 3.2–4.9)
ALBUMIN/GLOB SERPL: 0.9 G/DL
ALP SERPL-CCNC: 159 U/L (ref 30–99)
ALT SERPL-CCNC: 30 U/L (ref 2–50)
ANION GAP SERPL CALC-SCNC: 12 MMOL/L (ref 7–16)
AST SERPL-CCNC: 14 U/L (ref 12–45)
BASOPHILS # BLD AUTO: 0.1 % (ref 0–1.8)
BASOPHILS # BLD: 0.02 K/UL (ref 0–0.12)
BILIRUB SERPL-MCNC: 0.2 MG/DL (ref 0.1–1.5)
BUN SERPL-MCNC: 4 MG/DL (ref 8–22)
CALCIUM ALBUM COR SERPL-MCNC: 9.6 MG/DL (ref 8.5–10.5)
CALCIUM SERPL-MCNC: 8.9 MG/DL (ref 8.5–10.5)
CHLORIDE SERPL-SCNC: 101 MMOL/L (ref 96–112)
CO2 SERPL-SCNC: 26 MMOL/L (ref 20–33)
CREAT SERPL-MCNC: 0.45 MG/DL (ref 0.5–1.4)
EOSINOPHIL # BLD AUTO: 0 K/UL (ref 0–0.51)
EOSINOPHIL NFR BLD: 0 % (ref 0–6.9)
ERYTHROCYTE [DISTWIDTH] IN BLOOD BY AUTOMATED COUNT: 45.5 FL (ref 35.9–50)
GFR SERPLBLD CREATININE-BSD FMLA CKD-EPI: 136 ML/MIN/1.73 M 2
GLOBULIN SER CALC-MCNC: 3.4 G/DL (ref 1.9–3.5)
GLUCOSE SERPL-MCNC: 133 MG/DL (ref 65–99)
HCT VFR BLD AUTO: 33 % (ref 37–47)
HGB BLD-MCNC: 10.7 G/DL (ref 12–16)
IMM GRANULOCYTES # BLD AUTO: 0.17 K/UL (ref 0–0.11)
IMM GRANULOCYTES NFR BLD AUTO: 1.1 % (ref 0–0.9)
LYMPHOCYTES # BLD AUTO: 1.06 K/UL (ref 1–4.8)
LYMPHOCYTES NFR BLD: 6.9 % (ref 22–41)
MCH RBC QN AUTO: 30.2 PG (ref 27–33)
MCHC RBC AUTO-ENTMCNC: 32.4 G/DL (ref 32.2–35.5)
MCV RBC AUTO: 93.2 FL (ref 81.4–97.8)
MONOCYTES # BLD AUTO: 0.13 K/UL (ref 0–0.85)
MONOCYTES NFR BLD AUTO: 0.8 % (ref 0–13.4)
NEUTROPHILS # BLD AUTO: 13.97 K/UL (ref 1.82–7.42)
NEUTROPHILS NFR BLD: 91.1 % (ref 44–72)
NRBC # BLD AUTO: 0 K/UL
NRBC BLD-RTO: 0 /100 WBC (ref 0–0.2)
PLATELET # BLD AUTO: 323 K/UL (ref 164–446)
PMV BLD AUTO: 9.5 FL (ref 9–12.9)
POTASSIUM SERPL-SCNC: 4.4 MMOL/L (ref 3.6–5.5)
PROT SERPL-MCNC: 6.5 G/DL (ref 6–8.2)
RBC # BLD AUTO: 3.54 M/UL (ref 4.2–5.4)
SODIUM SERPL-SCNC: 139 MMOL/L (ref 135–145)
WBC # BLD AUTO: 15.4 K/UL (ref 4.8–10.8)

## 2023-08-22 PROCEDURE — 70491 CT SOFT TISSUE NECK W/DYE: CPT

## 2023-08-22 PROCEDURE — 700117 HCHG RX CONTRAST REV CODE 255

## 2023-08-22 PROCEDURE — 99233 SBSQ HOSP IP/OBS HIGH 50: CPT | Mod: GC | Performed by: HOSPITALIST

## 2023-08-22 PROCEDURE — 700105 HCHG RX REV CODE 258

## 2023-08-22 PROCEDURE — 700111 HCHG RX REV CODE 636 W/ 250 OVERRIDE (IP): Mod: JZ | Performed by: INTERNAL MEDICINE

## 2023-08-22 PROCEDURE — 770006 HCHG ROOM/CARE - MED/SURG/GYN SEMI*

## 2023-08-22 PROCEDURE — 85025 COMPLETE CBC W/AUTO DIFF WBC: CPT

## 2023-08-22 PROCEDURE — 700111 HCHG RX REV CODE 636 W/ 250 OVERRIDE (IP): Mod: JZ | Performed by: STUDENT IN AN ORGANIZED HEALTH CARE EDUCATION/TRAINING PROGRAM

## 2023-08-22 PROCEDURE — 80053 COMPREHEN METABOLIC PANEL: CPT

## 2023-08-22 PROCEDURE — 700111 HCHG RX REV CODE 636 W/ 250 OVERRIDE (IP): Mod: JZ

## 2023-08-22 PROCEDURE — 700105 HCHG RX REV CODE 258: Performed by: INTERNAL MEDICINE

## 2023-08-22 PROCEDURE — 36415 COLL VENOUS BLD VENIPUNCTURE: CPT

## 2023-08-22 PROCEDURE — 700105 HCHG RX REV CODE 258: Performed by: STUDENT IN AN ORGANIZED HEALTH CARE EDUCATION/TRAINING PROGRAM

## 2023-08-22 PROCEDURE — 700111 HCHG RX REV CODE 636 W/ 250 OVERRIDE (IP)

## 2023-08-22 PROCEDURE — 76536 US EXAM OF HEAD AND NECK: CPT

## 2023-08-22 RX ADMIN — VANCOMYCIN HYDROCHLORIDE 1750 MG: 5 INJECTION, POWDER, LYOPHILIZED, FOR SOLUTION INTRAVENOUS at 19:41

## 2023-08-22 RX ADMIN — SODIUM CHLORIDE: 900 INJECTION INTRAVENOUS at 14:39

## 2023-08-22 RX ADMIN — AMPICILLIN AND SULBACTAM 3 G: 1; 2 INJECTION, POWDER, FOR SOLUTION INTRAMUSCULAR; INTRAVENOUS at 05:13

## 2023-08-22 RX ADMIN — KETOROLAC TROMETHAMINE 30 MG: 30 INJECTION, SOLUTION INTRAMUSCULAR; INTRAVENOUS at 17:14

## 2023-08-22 RX ADMIN — AMPICILLIN AND SULBACTAM 3 G: 1; 2 INJECTION, POWDER, FOR SOLUTION INTRAMUSCULAR; INTRAVENOUS at 18:38

## 2023-08-22 RX ADMIN — AMPICILLIN AND SULBACTAM 3 G: 1; 2 INJECTION, POWDER, FOR SOLUTION INTRAMUSCULAR; INTRAVENOUS at 12:11

## 2023-08-22 RX ADMIN — IOHEXOL 90 ML: 350 INJECTION, SOLUTION INTRAVENOUS at 18:13

## 2023-08-22 ASSESSMENT — ENCOUNTER SYMPTOMS
FEVER: 0
SEIZURES: 0
SORE THROAT: 0
STRIDOR: 0
SHORTNESS OF BREATH: 0
LOSS OF CONSCIOUSNESS: 0
COUGH: 0
INSOMNIA: 0
ABDOMINAL PAIN: 0
PALPITATIONS: 0

## 2023-08-22 ASSESSMENT — PAIN DESCRIPTION - PAIN TYPE
TYPE: SURGICAL PAIN

## 2023-08-22 NOTE — PROGRESS NOTES
Pt arrived to unit via bed at 0830 with pt transport. Pt is AOX4. Pt reports 0/10 pain. Pt in 1L of oxgen saturation at 94%. Performed 2 RN's skin check. Assessed dressing on the left neck, noted a small serosanguineous drainage from the penrose drain.  Discussed pt plan of care.  All questions answered at this time. Call light within reach, fall precautions in place and hourly rounding in place.

## 2023-08-22 NOTE — ANESTHESIA POSTPROCEDURE EVALUATION
Patient: Karlie Santos    Procedure Summary     Date: 08/21/23 Room / Location: [place] [address] / SURGERY [facility]    Anesthesia Start: 1713 Anesthesia Stop: 1817    Procedure: INCISION AND DRAINAGE LEFT NECK ABSCESS (Neck) Diagnosis: (Left Neck Abscess)    Surgeons: Maverick Benson M.D. Responsible Provider: Patrice Cervantes M.D.    Anesthesia Type: general ASA Status: 2 - Emergent          Final Anesthesia Type: general  Last vitals  BP   Blood Pressure: 122/84    Temp   37.1 °C (98.7 °F)    Pulse   (!) 105   Resp   17    SpO2   97 %      Anesthesia Post Evaluation    Patient location during evaluation: PACU  Patient participation: complete - patient participated  Level of consciousness: awake and alert  Pain score: 5    Airway patency: patent  Anesthetic complications: no  Cardiovascular status: hemodynamically stable  Respiratory status: acceptable  Hydration status: euvolemic    PONV: none          No notable events documented.     Nurse Pain Score: 6 (NPRS)

## 2023-08-22 NOTE — PROGRESS NOTES
"[facility] Internal Medicine Daily Progress Note    Date of Service  8/22/2023    [facility] Team: R IM Blue Team   Attending: STACIA Marcus M.d.  Senior Resident: Dr. Naveed Oleary  Intern:  Dr. Rio Walton  Contact Number: [phone]    Chief Complaint  Karlie Santos is a 28 y.o. female admitted 8/18/2023 with retropharyngeal abscess.     Hospital Course  Karlie Mccabe is a 28 year old female with history of methamphetamine and tobacco abuse who was transferred here on 8/18/23 for ENT consultation for neck abscess. CT soft tissue neck showed mildly worsening extensive inflammatory changes within the superficial and deep left neck with slightly increased retropharyngeal effusion, new superior mesenteric fat stranding suspicious for mediastinitis, necrosis of upper left cervical lymph node, and new mild thickening/edema of the epiglottis. Patient met sepsis criteria with tachycardia 115 and leukocytosis WBC 31. She received fluids, ceftriaxone, and vancomycin. Patient underwent I&D of the abscess on 8/19 and is on IV Unasyn. Patient had worsening swelling post op day 2 and repeat CT on 8/21 showed extensive suppurative adenopathy and abscess formation in left side of neck. She underwent another I&D of the neck and has a 1/4\" penrose drain in place. Her swelling and erythema has seemed to worsen. Ultrasound of neck shows subcutaneous fluid collection on left side of the neck.  Repeat CT soft tissue ordered.    Interval Problem Update  S/p 2nd I&D with drain in place.  Erythema and swelling seems worse.  Ultrasound shows a small collection on the left side of the neck, seems unchanged.  Repeat CT ordered.  ENT aware and following.   WBC uptrending 15.4 today  MRSA nares negative. However, as patient's abscess appears unchanged/worsening, will start vancomycin for possible MRSA      I have discussed this patient's plan of care and discharge plan at IDT rounds today with Case Management, Nursing, Nursing leadership, and other " members of the IDT team.    Consultants/Specialty  Otolaryngology    Code Status  Full Code    Disposition  The patient is not medically cleared for discharge to home or a post-acute facility.        Review of Systems  Review of Systems   Constitutional:  Negative for fever and malaise/fatigue.   HENT:  Negative for sore throat.    Respiratory:  Negative for cough, shortness of breath and stridor.    Cardiovascular:  Negative for chest pain, palpitations and leg swelling.   Gastrointestinal:  Negative for abdominal pain.   Neurological:  Negative for seizures and loss of consciousness.   Psychiatric/Behavioral:  The patient does not have insomnia.         Physical Exam  Temp:  [36.1 °C (96.9 °F)-37.1 °C (98.7 °F)] 36.1 °C (96.9 °F)  Pulse:  [] 88  Resp:  [17-22] 18  BP: (101-128)/(56-84) 105/63  SpO2:  [92 %-98 %] 95 %    Physical Exam  Vitals and nursing note reviewed.   Constitutional:       General: She is not in acute distress.     Appearance: Normal appearance. She is normal weight.   HENT:      Head: Normocephalic and atraumatic.      Comments: Neck incision is tender with worsening edema      Right Ear: External ear normal.      Left Ear: External ear normal.   Cardiovascular:      Rate and Rhythm: Normal rate and regular rhythm.      Pulses: Normal pulses.      Heart sounds: Normal heart sounds. No murmur heard.  Pulmonary:      Effort: Pulmonary effort is normal. No respiratory distress.   Abdominal:      General: Abdomen is flat. There is no distension.   Musculoskeletal:         General: No swelling, deformity or signs of injury.   Skin:     Coloration: Skin is not jaundiced.   Neurological:      General: No focal deficit present.      Mental Status: She is alert and oriented to person, place, and time. Mental status is at baseline.   Psychiatric:         Mood and Affect: Mood normal.         Thought Content: Thought content normal.         Fluids    Intake/Output Summary (Last 24 hours) at  8/22/2023 1554  Last data filed at 8/22/2023 1000  Gross per 24 hour   Intake 740 ml   Output 20 ml   Net 720 ml       Laboratory  Recent Labs     08/20/23  0142 08/21/23 0317 08/22/23  0102   WBC 20.8* 13.2* 15.4*   RBC 3.74* 3.18* 3.54*   HEMOGLOBIN 11.2* 9.6* 10.7*   HEMATOCRIT 34.7* 29.6* 33.0*   MCV 92.8 93.1 93.2   MCH 29.9 30.2 30.2   MCHC 32.3 32.4 32.4   RDW 45.6 45.2 45.5   PLATELETCT 264 258 323   MPV 10.8 10.1 9.5     Recent Labs     08/20/23  0142 08/21/23 0317 08/22/23  0102   SODIUM 136 138 139   POTASSIUM 3.9 3.7 4.4   CHLORIDE 100 106 101   CO2 26 24 26   GLUCOSE 106* 104* 133*   BUN 5* 8 4*   CREATININE 0.37* 0.41* 0.45*   CALCIUM 8.6 8.5 8.9                     Imaging  US-SOFT TISSUES OF HEAD - NECK   Final Result      1.  3.5 x 3.2 x 1.9 cm subcutaneous fluid collection in the left side of the neck consistent with abscess, seroma, and/or hematoma.         CT-SOFT TISSUE NECK WITH   Final Result         1.  Extensive suppurative adenopathy along the internal jugular chain of nodes.      2.  3.2 x 1.8 x 3.1 cm focal area of abscess formation in the left side of the neck in the region of suppurtive adenopathy at level 2. Surgical incision and drainage should be considered.      3.  Multifocal groundglass opacity in the upper lobes bilaterally suspicious for pneumonitis possibly Covid.      CT-SOFT TISSUE NECK WITH   Final Result      1.  Probably mildly worsening extensive inflammatory changes within the superficial and deep left neck, with slightly increased retropharyngeal effusion and some new superior mesenteric fat stranding suspicious for mediastinitis.   2.  Asymmetric left cervical lymph nodes which are presumably reactive with associated necrosis of upper left cervical lymph node.   3.  New mild thickening/edema of the epiglottis.   4.  Small bilateral pleural effusions.      CT-FOREIGN FILM CAT SCAN   Final Result      CT-SOFT TISSUE NECK WITH    (Results Pending)   EC-ECHOCARDIOGRAM  "COMPLETE W/O CONT    (Results Pending)        Assessment/Plan  Problem Representation:    Retropharyngeal abscess  Assessment & Plan  Likely secondary to IV drug use.  Will order echo to rule out endocarditis.  Patient is leukocytotic with white count uptrending to 15.2 today.  MRSA nares came back negative-vancomycin discontinued  Wound cx reveals gram(+) cocci. Pending final results  S/p 2 neck I&Ds with ENT. 1/4\" Penrose drain in place.  Worsening swelling and erythema today  Ultrasound of the neck shows unchanged abscess.  Repeat CT pending.  -ENT following  -Continue IV Unasyn  -echo  -pain management with oxy 5 mg and dilaudid         Acute lactic acidosis  Assessment & Plan  Now resolved with IV fluids    Tobacco dependence  Assessment & Plan  Tobacco cessation education provided for more than 3 minutes, discussed options of nicotine patch, medical treatment with wellbutrin and chantix. Discussed the risks of smoking including increased risk of heart disease, stroke, cancer and COPD      Lymphadenopathy  Assessment & Plan  CT at outside facility 23 x 15 mm left jugulodigastric lymphadenopathy with central hypoattenuation concerning for necrotic lymphadenopathy, multiple posterior cervical chain lymphadenopathy noted.   Status post drainage of retropharyngeal abscess x2  ENT following  -continue IV Unasyn      Methamphetamine abuse (HCC)  Assessment & Plan  Encouraged sobriety          VTE prophylaxis: pharmacologic prophylaxis contraindicated due to potential need for repeat incision and drainage.    I have performed a physical exam and reviewed and updated ROS and Plan today (8/22/2023). In review of yesterday's note (8/21/2023), there are no changes except as documented above.          "

## 2023-08-22 NOTE — OR NURSING
1805: Pt arrived from OR post InD of L neck abscess. Pt is awake. Sight dressing is CDI. Cardiac rhythm appears to be SR/ST.     1820: Pt up to restroom with steady gait; voided.     1833: Tolerating sips with medications.     1857: Updated pt's contact, David.     1926: Report to JONO Adkins.     1959: Pt back to floor via bed with transport. Pt on 1L tank is half full.  Minimal blood on dressing; Dressing gauze changed.

## 2023-08-22 NOTE — ANESTHESIA PROCEDURE NOTES
Airway    Date/Time: 8/21/2023 5:22 PM    Performed by: Patrice Cervantes M.D.  Authorized by: Patrice Cervantes M.D.    Location:  OR  Urgency:  Elective  Indications for Airway Management:  Anesthesia      Spontaneous Ventilation: absent    Sedation Level:  Deep  Preoxygenated: Yes    Patient Position:  Sniffing and reverse Trendelenburg  Mask Difficulty Assessment:  2 - vent by mask + OA or adjuvant +/- NMBA  Final Airway Type:  Endotracheal airway  Final Endotracheal Airway:  ETT  Cuffed: Yes    Technique Used for Successful ETT Placement:  Direct laryngoscopy  Devices/Methods Used in Placement:  Anterior pressure/BURP    Insertion Site:  Oral  Blade Type:  Kae  Laryngoscope Blade/Videolaryngoscope Blade Size:  3  ETT Size (mm):  6.5  Measured from:  Lips  ETT to Lips (cm):  21  Placement Verified by: auscultation and capnometry    Cormack-Lehane Classification:  Grade IIa - partial view of glottis  Number of Attempts at Approach:  1   Atraumatic x1 attempt, mouth opening improved following paralysis

## 2023-08-22 NOTE — OP REPORT
DATE OF SERVICE:  08/21/2023     PREOPERATIVE DIAGNOSIS:  Left neck abscess.     POSTOPERATIVE DIAGNOSIS:  Left neck abscess.     PROCEDURE PERFORMED:  Incision and drainage of left neck abscess.     ANESTHESIA:  General endotracheal.     ANESTHESIOLOGIST:  Patrice Cervantes MD     INDICATIONS FOR PROCEDURE:  The patient is a 28-year-old female who has had   one week of left neck swelling.  She had a CT scan at outside hospital showing   a necrotic lymph node.  She failed IV antibiotics and was taken to the   operating room 2 days ago for an incision and drainage of a retropharyngeal   abscess.  She felt better for one day, but then her symptoms returned.  Repeat   CT scan today showed a larger left neck abscess.  Risks, benefits and   alternatives were discussed with the patient preoperatively who gave her   informed consent.     OPERATIVE FINDINGS:  A 1-2 mL of purulence despite probing from the mandibular   ramus to the mastoid process.  There was a cavity that felt like an abscess   cavity; however, there was not much purulence.     PROCEDURE IN DETAIL:  The patient was identified in the preoperative holding   area and brought to the operating room.  She was intubated without difficulty   and a timeout was satisfactorily completed.  A 4 mL of 0.5% Marcaine with   epinephrine was injected into the left neck at a location on the anterior   border of the sternocleidomastoid muscle between the mandibular ramus and the   mastoid.  Incision was made with a 15 blade scalpel and taken down to the   platysma.  Blunt dissection was used to probe the area approximately 2-3 cm   beneath the skin.  There was a cavity that was palpable, but minimal purulence   returned.  Blunt instrumentation was used to probe all around the area   looking for more purulence, but none could be expressed.  The wound was   irrigated with normal saline and a quarter-inch Penrose was placed and secured   with a 2-0 silk suture.  The patient  was extubated and brought to the   postanesthesia care unit in good condition.     ESTIMATED BLOOD LOSS:  20 mL.     COMPLICATIONS:  None.     SPECIMENS REMOVED:  None.     DRAIN:  Quarter-inch Penrose.        ______________________________  Maverick VALDOVINOS/LAMAR/JOCELYN    DD:  08/21/2023 18:06  DT:  08/21/2023 18:26    Job#:  [id number]

## 2023-08-22 NOTE — CARE PLAN
The patient is Stable - Low risk of patient condition declining or worsening    Shift Goals  Clinical Goals: Pain control and monitor penrose drain Left neck  Patient Goals: Sleep  Family Goals: No family at bedside    Progress made toward(s) clinical / shift goals:    Problem: Knowledge Deficit - Standard  Goal: Patient and family/care givers will demonstrate understanding of plan of care, disease process/condition, diagnostic tests and medications  Outcome: Progressing  Note: Updated pt poc: Monitor penrose drain output and continue IV abx and fluids.      Problem: Pain - Standard  Goal: Alleviation of pain or a reduction in pain to the patient’s comfort goal  Outcome: Progressing  Flowsheets (Taken 8/21/2023 3532)  OB Pain Intervention:   Relaxation technique   Rest  Note: Pt will report pain was controlled <5 during shift.        Patient is not progressing towards the following goals:

## 2023-08-22 NOTE — ANESTHESIA PREPROCEDURE EVALUATION
Case: [id number] Date/Time: 08/21/23 1645    Procedure: INCISION AND DRAINAGE ABSCESS (Neck)    Location: [place] [address] / SURGERY [facility]    Surgeons: Maverick Benson M.D.        Anes H&P:  PAST MEDICAL HISTORY:   28 y.o. female who presents for Procedure(s):  INCISION AND DRAINAGE ABSCESS.  She has current and past medical problems significant for:    Patient denies history of previous MI, chest pain or shortness of breath  Able to climb 2 flights of stair without dyspnea or angina, > 4 METs     Denies pregnancy or breastfeeding    Patient denies history of complications with anesthesia    Past Medical History:   Diagnosis Date   • Polysubstance abuse (HCC)     METH, Marijuana       SMOKING/ALCOHOL/RECREATIONAL DRUG USE:  Social History     Tobacco Use   • Smoking status: Every Day     Packs/day: .5     Types: Cigarettes     Start date: 1/1/2008   • Smokeless tobacco: Never   Substance Use Topics   • Alcohol use: Yes     Comment: Very occationally   • Drug use: Yes     Types: Inhaled     Comment: Meth, Marijuana - Last used when found out she was pregnant     Social History     Substance and Sexual Activity   Drug Use Yes   • Types: Inhaled    Comment: Meth, Marijuana - Last used when found out she was pregnant       PAST SURGICAL HISTORY:  Past Surgical History:   Procedure Laterality Date   • TONSILLECTOMY N/A 8/19/2023    Procedure: INCISION & DRAINAGE, RETROPHYARYNGEAL ABSCESS;  Surgeon: Maverick Benson M.D.;  Location: SURGERY [facility];  Service: Ent   • PRIMARY C SECTION Bilateral 7/16/2016    Procedure: PRIMARY C SECTION;  Surgeon: Michael Underwood M.D.;  Location: LABOR AND DELIVERY;  Service:    • OTHER ORTHOPEDIC SURGERY      surgery on R femur at 5yo       ALLERGIES:   No Known Allergies    MEDICATIONS:  No current facility-administered medications on file prior to encounter.     No current outpatient medications on file prior to encounter.       LABS:  Recent Labs     08/19/23  0530 08/20/23  0142  23   WBC 24.1* 20.8* 13.2*   RBC 3.40* 3.74* 3.18*   HEMOGLOBIN 10.5* 11.2* 9.6*   HEMATOCRIT 32.1* 34.7* 29.6*   MCV 94.4 92.8 93.1   MCH 30.9 29.9 30.2   RDW 45.8 45.6 45.2   PLATELETCT 246 264 258   MPV 11.0 10.8 10.1      Lab Results   Component Value Date/Time    SODIUM 138 2023    POTASSIUM 3.7 2023    CHLORIDE 106 2023    CO2 24 2023    GLUCOSE 104 (H) 2023    BUN 8 2023    CALCIUM 8.5 2023         PREVIOUS ANESTHETICS: See EMR  __________________________________________    Relevant Problems   OB   (positive)  labor       Physical Exam    Airway   Mallampati: III  TM distance: <3 FB  Neck ROM: full       Cardiovascular - normal exam  Rhythm: regular  Rate: normal  (-) murmur     Dental - normal exam           Pulmonary - normal exam  Breath sounds clear to auscultation     Abdominal    Neurological - normal exam                 Anesthesia Plan    ASA 2- EMERGENT   ASA physical status emergent criteria: acute deteriorating condition due to infection    Plan - general       Airway plan will be ETT          Induction: intravenous    Postoperative Plan: Postoperative administration of opioids is intended.    Pertinent diagnostic labs and testing reviewed    Informed Consent:    Anesthetic plan and risks discussed with patient.    Use of blood products discussed with: patient whom consented to blood products.

## 2023-08-22 NOTE — CARE PLAN
Problem: Knowledge Deficit - Standard  Goal: Patient and family/care givers will demonstrate understanding of plan of care, disease process/condition, diagnostic tests and medications  Outcome: Progressing     Problem: Pain - Standard  Goal: Alleviation of pain or a reduction in pain to the patient’s comfort goal  Outcome: Progressing     Problem: Respiratory  Goal: Patient will achieve/maintain optimum respiratory ventilation and gas exchange  Outcome: Progressing   The patient is Stable - Low risk of patient condition declining or worsening    Shift Goals  Clinical Goals: iv abx  Patient Goals: rest comfort  Family Goals: No family at bedside    Progress made toward(s) clinical / shift goals:       Patient is not progressing towards the following goals:

## 2023-08-22 NOTE — ANESTHESIA TIME REPORT
Anesthesia Start and Stop Event Times     Date Time Event    8/21/2023 1709 Ready for Procedure     1713 Anesthesia Start     1817 Anesthesia Stop        Responsible Staff  08/21/23    Name Role Begin End    Patrice Cervantes M.D. Anesth 1713 1817        Overtime Reason:  no overtime (within assigned shift)    Comments:

## 2023-08-22 NOTE — OR SURGEON
"Immediate Post OP Note    PreOp Diagnosis: L neck abscess      PostOp Diagnosis: Same      Procedure(s):  INCISION AND DRAINAGE LEFT NECK ABSCESS - Wound Class: Dirty or Infected    Surgeon(s):  Maverick Benson M.D.    Anesthesiologist/Type of Anesthesia:  Anesthesiologist: Patrice Cervantes M.D./General    Surgical Staff:  Circulator: Izabela Cespedes R.N.  Scrub Person: Yessenia Quinones    Specimens removed if any:  * No specimens in log *    Estimated Blood Loss: 20cc    Findings: 1-2cc purulence despite probing from the ramus of the mandible to the mastoid    Complications: None    Drain: 1/4\" penrose    #[id number]    8/21/2023 6:02 PM Maverick Benson M.D.  "

## 2023-08-22 NOTE — PROGRESS NOTES
Pt arrived to unit via bed at 0830 with pt transport. Pt is AOX4. Pt reports 0/10 pain. Pt in 1L of oxgen saturation at 94%. Performed 2 RN's skin check. Assessed dressing on the left neck, noted a small serosanguineous drainage from the penrose drain.  Discussed pt plan of care.  All questions answered at this time. Call light within reach, fall precautions in place and hourly rounding in place

## 2023-08-22 NOTE — PROGRESS NOTES
4 Eyes Skin Assessment Completed by JONO Valentine and JONO Menjivar.    Head WDL  Ears WDL  Nose WDL  Mouth WDL  Neck Redness, Blanching, and Incision  Breast/Chest WDL  Shoulder Blades WDL  Spine WDL  (R) Arm/Elbow/Hand Bruising  (L) Arm/Elbow/Hand WDL  Abdomen WDL  Groin WDL  Scrotum/Coccyx/Buttocks WDL  (R) Leg WDL  (L) Leg WDL  (R) Heel/Foot/Toe WDL  (L) Heel/Foot/Toe WDL          Devices In Places Pulse Ox      Interventions In Place N/A    Possible Skin Injury No    Pictures Uploaded Into Epic N/A  Wound Consult Placed N/A  RN Wound Prevention Protocol Ordered No

## 2023-08-23 ENCOUNTER — APPOINTMENT (OUTPATIENT)
Dept: CARDIOLOGY | Facility: MEDICAL CENTER | Age: 28
DRG: 854 | End: 2023-08-23
Payer: MEDICAID

## 2023-08-23 ENCOUNTER — ANESTHESIA (OUTPATIENT)
Dept: SURGERY | Facility: MEDICAL CENTER | Age: 28
DRG: 854 | End: 2023-08-23
Payer: MEDICAID

## 2023-08-23 ENCOUNTER — ANESTHESIA EVENT (OUTPATIENT)
Dept: SURGERY | Facility: MEDICAL CENTER | Age: 28
DRG: 854 | End: 2023-08-23
Payer: MEDICAID

## 2023-08-23 LAB
ALBUMIN SERPL BCP-MCNC: 2.5 G/DL (ref 3.2–4.9)
ALBUMIN/GLOB SERPL: 0.8 G/DL
ALP SERPL-CCNC: 116 U/L (ref 30–99)
ALT SERPL-CCNC: 30 U/L (ref 2–50)
ANION GAP SERPL CALC-SCNC: 9 MMOL/L (ref 7–16)
AST SERPL-CCNC: 20 U/L (ref 12–45)
BACTERIA BLD CULT: NORMAL
BACTERIA BLD CULT: NORMAL
BACTERIA SPEC ANAEROBE CULT: NORMAL
BACTERIA WND AEROBE CULT: ABNORMAL
BACTERIA WND AEROBE CULT: ABNORMAL
BASOPHILS # BLD AUTO: 0.3 % (ref 0–1.8)
BASOPHILS # BLD: 0.04 K/UL (ref 0–0.12)
BILIRUB SERPL-MCNC: <0.2 MG/DL (ref 0.1–1.5)
BUN SERPL-MCNC: 8 MG/DL (ref 8–22)
CALCIUM ALBUM COR SERPL-MCNC: 9.2 MG/DL (ref 8.5–10.5)
CALCIUM SERPL-MCNC: 8 MG/DL (ref 8.5–10.5)
CHLORIDE SERPL-SCNC: 105 MMOL/L (ref 96–112)
CO2 SERPL-SCNC: 25 MMOL/L (ref 20–33)
CREAT SERPL-MCNC: 0.44 MG/DL (ref 0.5–1.4)
EOSINOPHIL # BLD AUTO: 0.08 K/UL (ref 0–0.51)
EOSINOPHIL NFR BLD: 0.7 % (ref 0–6.9)
ERYTHROCYTE [DISTWIDTH] IN BLOOD BY AUTOMATED COUNT: 44.3 FL (ref 35.9–50)
GFR SERPLBLD CREATININE-BSD FMLA CKD-EPI: 136 ML/MIN/1.73 M 2
GLOBULIN SER CALC-MCNC: 3.1 G/DL (ref 1.9–3.5)
GLUCOSE SERPL-MCNC: 102 MG/DL (ref 65–99)
GRAM STN SPEC: ABNORMAL
HCG UR QL: NEGATIVE
HCT VFR BLD AUTO: 30.3 % (ref 37–47)
HGB BLD-MCNC: 10 G/DL (ref 12–16)
IMM GRANULOCYTES # BLD AUTO: 0.25 K/UL (ref 0–0.11)
IMM GRANULOCYTES NFR BLD AUTO: 2.1 % (ref 0–0.9)
LYMPHOCYTES # BLD AUTO: 3.59 K/UL (ref 1–4.8)
LYMPHOCYTES NFR BLD: 30.5 % (ref 22–41)
MCH RBC QN AUTO: 30.1 PG (ref 27–33)
MCHC RBC AUTO-ENTMCNC: 33 G/DL (ref 32.2–35.5)
MCV RBC AUTO: 91.3 FL (ref 81.4–97.8)
MONOCYTES # BLD AUTO: 0.75 K/UL (ref 0–0.85)
MONOCYTES NFR BLD AUTO: 6.4 % (ref 0–13.4)
NEUTROPHILS # BLD AUTO: 7.06 K/UL (ref 1.82–7.42)
NEUTROPHILS NFR BLD: 60 % (ref 44–72)
NRBC # BLD AUTO: 0 K/UL
NRBC BLD-RTO: 0 /100 WBC (ref 0–0.2)
PLATELET # BLD AUTO: 374 K/UL (ref 164–446)
PMV BLD AUTO: 9.5 FL (ref 9–12.9)
POTASSIUM SERPL-SCNC: 3.8 MMOL/L (ref 3.6–5.5)
PROT SERPL-MCNC: 5.6 G/DL (ref 6–8.2)
RBC # BLD AUTO: 3.32 M/UL (ref 4.2–5.4)
SIGNIFICANT IND 70042: ABNORMAL
SIGNIFICANT IND 70042: NORMAL
SITE SITE: ABNORMAL
SITE SITE: NORMAL
SODIUM SERPL-SCNC: 139 MMOL/L (ref 135–145)
SOURCE SOURCE: ABNORMAL
SOURCE SOURCE: NORMAL
WBC # BLD AUTO: 11.8 K/UL (ref 4.8–10.8)

## 2023-08-23 PROCEDURE — 160035 HCHG PACU - 1ST 60 MINS PHASE I: Performed by: OTOLARYNGOLOGY

## 2023-08-23 PROCEDURE — 36415 COLL VENOUS BLD VENIPUNCTURE: CPT

## 2023-08-23 PROCEDURE — 700102 HCHG RX REV CODE 250 W/ 637 OVERRIDE(OP): Performed by: ANESTHESIOLOGY

## 2023-08-23 PROCEDURE — A9270 NON-COVERED ITEM OR SERVICE: HCPCS | Performed by: ANESTHESIOLOGY

## 2023-08-23 PROCEDURE — 700111 HCHG RX REV CODE 636 W/ 250 OVERRIDE (IP): Mod: JZ | Performed by: OTOLARYNGOLOGY

## 2023-08-23 PROCEDURE — 80053 COMPREHEN METABOLIC PANEL: CPT

## 2023-08-23 PROCEDURE — 700111 HCHG RX REV CODE 636 W/ 250 OVERRIDE (IP): Performed by: INTERNAL MEDICINE

## 2023-08-23 PROCEDURE — 0J950ZZ DRAINAGE OF LEFT NECK SUBCUTANEOUS TISSUE AND FASCIA, OPEN APPROACH: ICD-10-PCS | Performed by: OTOLARYNGOLOGY

## 2023-08-23 PROCEDURE — 81025 URINE PREGNANCY TEST: CPT

## 2023-08-23 PROCEDURE — 160002 HCHG RECOVERY MINUTES (STAT): Performed by: OTOLARYNGOLOGY

## 2023-08-23 PROCEDURE — C1729 CATH, DRAINAGE: HCPCS | Performed by: OTOLARYNGOLOGY

## 2023-08-23 PROCEDURE — 700111 HCHG RX REV CODE 636 W/ 250 OVERRIDE (IP): Mod: JZ

## 2023-08-23 PROCEDURE — 700101 HCHG RX REV CODE 250: Performed by: ANESTHESIOLOGY

## 2023-08-23 PROCEDURE — 700111 HCHG RX REV CODE 636 W/ 250 OVERRIDE (IP): Performed by: ANESTHESIOLOGY

## 2023-08-23 PROCEDURE — 700101 HCHG RX REV CODE 250: Performed by: OTOLARYNGOLOGY

## 2023-08-23 PROCEDURE — 99233 SBSQ HOSP IP/OBS HIGH 50: CPT | Mod: GC | Performed by: HOSPITALIST

## 2023-08-23 PROCEDURE — 700105 HCHG RX REV CODE 258: Mod: JZ | Performed by: ANESTHESIOLOGY

## 2023-08-23 PROCEDURE — 700111 HCHG RX REV CODE 636 W/ 250 OVERRIDE (IP): Performed by: HOSPITALIST

## 2023-08-23 PROCEDURE — 160048 HCHG OR STATISTICAL LEVEL 1-5: Performed by: OTOLARYNGOLOGY

## 2023-08-23 PROCEDURE — 88304 TISSUE EXAM BY PATHOLOGIST: CPT

## 2023-08-23 PROCEDURE — 700111 HCHG RX REV CODE 636 W/ 250 OVERRIDE (IP): Mod: JZ | Performed by: ANESTHESIOLOGY

## 2023-08-23 PROCEDURE — 700102 HCHG RX REV CODE 250 W/ 637 OVERRIDE(OP): Performed by: INTERNAL MEDICINE

## 2023-08-23 PROCEDURE — 700111 HCHG RX REV CODE 636 W/ 250 OVERRIDE (IP)

## 2023-08-23 PROCEDURE — A9270 NON-COVERED ITEM OR SERVICE: HCPCS | Performed by: INTERNAL MEDICINE

## 2023-08-23 PROCEDURE — 160038 HCHG SURGERY MINUTES - EA ADDL 1 MIN LEVEL 2: Performed by: OTOLARYNGOLOGY

## 2023-08-23 PROCEDURE — 770006 HCHG ROOM/CARE - MED/SURG/GYN SEMI*

## 2023-08-23 PROCEDURE — 85025 COMPLETE CBC W/AUTO DIFF WBC: CPT

## 2023-08-23 PROCEDURE — 700105 HCHG RX REV CODE 258

## 2023-08-23 PROCEDURE — 160009 HCHG ANES TIME/MIN: Performed by: OTOLARYNGOLOGY

## 2023-08-23 PROCEDURE — 160027 HCHG SURGERY MINUTES - 1ST 30 MINS LEVEL 2: Performed by: OTOLARYNGOLOGY

## 2023-08-23 RX ORDER — DEXAMETHASONE SODIUM PHOSPHATE 4 MG/ML
INJECTION, SOLUTION INTRA-ARTICULAR; INTRALESIONAL; INTRAMUSCULAR; INTRAVENOUS; SOFT TISSUE PRN
Status: DISCONTINUED | OUTPATIENT
Start: 2023-08-23 | End: 2023-08-23 | Stop reason: SURG

## 2023-08-23 RX ORDER — HYDROMORPHONE HYDROCHLORIDE 1 MG/ML
0.1 INJECTION, SOLUTION INTRAMUSCULAR; INTRAVENOUS; SUBCUTANEOUS
Status: DISCONTINUED | OUTPATIENT
Start: 2023-08-23 | End: 2023-08-23 | Stop reason: HOSPADM

## 2023-08-23 RX ORDER — ONDANSETRON 2 MG/ML
4 INJECTION INTRAMUSCULAR; INTRAVENOUS
Status: DISCONTINUED | OUTPATIENT
Start: 2023-08-23 | End: 2023-08-23 | Stop reason: HOSPADM

## 2023-08-23 RX ORDER — EPHEDRINE SULFATE 50 MG/ML
5 INJECTION, SOLUTION INTRAVENOUS
Status: DISCONTINUED | OUTPATIENT
Start: 2023-08-23 | End: 2023-08-23 | Stop reason: HOSPADM

## 2023-08-23 RX ORDER — CEFAZOLIN SODIUM 1 G/3ML
INJECTION, POWDER, FOR SOLUTION INTRAMUSCULAR; INTRAVENOUS
Status: DISCONTINUED | OUTPATIENT
Start: 2023-08-23 | End: 2023-08-23 | Stop reason: HOSPADM

## 2023-08-23 RX ORDER — LIDOCAINE HYDROCHLORIDE 20 MG/ML
INJECTION, SOLUTION EPIDURAL; INFILTRATION; INTRACAUDAL; PERINEURAL PRN
Status: DISCONTINUED | OUTPATIENT
Start: 2023-08-23 | End: 2023-08-23 | Stop reason: SURG

## 2023-08-23 RX ORDER — HYDROMORPHONE HYDROCHLORIDE 1 MG/ML
0.4 INJECTION, SOLUTION INTRAMUSCULAR; INTRAVENOUS; SUBCUTANEOUS
Status: DISCONTINUED | OUTPATIENT
Start: 2023-08-23 | End: 2023-08-23 | Stop reason: HOSPADM

## 2023-08-23 RX ORDER — KETOROLAC TROMETHAMINE 30 MG/ML
INJECTION, SOLUTION INTRAMUSCULAR; INTRAVENOUS PRN
Status: DISCONTINUED | OUTPATIENT
Start: 2023-08-23 | End: 2023-08-23 | Stop reason: SURG

## 2023-08-23 RX ORDER — LIDOCAINE HYDROCHLORIDE 10 MG/ML
INJECTION, SOLUTION EPIDURAL; INFILTRATION; INTRACAUDAL; PERINEURAL
Status: DISPENSED
Start: 2023-08-23 | End: 2023-08-24

## 2023-08-23 RX ORDER — CEFAZOLIN SODIUM 1 G/3ML
INJECTION, POWDER, FOR SOLUTION INTRAMUSCULAR; INTRAVENOUS
Status: DISPENSED
Start: 2023-08-23 | End: 2023-08-24

## 2023-08-23 RX ORDER — OXYCODONE HCL 5 MG/5 ML
5 SOLUTION, ORAL ORAL
Status: COMPLETED | OUTPATIENT
Start: 2023-08-23 | End: 2023-08-23

## 2023-08-23 RX ORDER — SODIUM CHLORIDE, SODIUM LACTATE, POTASSIUM CHLORIDE, CALCIUM CHLORIDE 600; 310; 30; 20 MG/100ML; MG/100ML; MG/100ML; MG/100ML
INJECTION, SOLUTION INTRAVENOUS CONTINUOUS
Status: DISCONTINUED | OUTPATIENT
Start: 2023-08-23 | End: 2023-08-23 | Stop reason: HOSPADM

## 2023-08-23 RX ORDER — EPINEPHRINE 1 MG/ML(1)
AMPUL (ML) INJECTION
Status: DISPENSED
Start: 2023-08-23 | End: 2023-08-24

## 2023-08-23 RX ORDER — METOPROLOL TARTRATE 1 MG/ML
1 INJECTION, SOLUTION INTRAVENOUS
Status: DISCONTINUED | OUTPATIENT
Start: 2023-08-23 | End: 2023-08-23 | Stop reason: HOSPADM

## 2023-08-23 RX ORDER — OXYCODONE HCL 5 MG/5 ML
10 SOLUTION, ORAL ORAL
Status: COMPLETED | OUTPATIENT
Start: 2023-08-23 | End: 2023-08-23

## 2023-08-23 RX ORDER — ONDANSETRON 2 MG/ML
INJECTION INTRAMUSCULAR; INTRAVENOUS PRN
Status: DISCONTINUED | OUTPATIENT
Start: 2023-08-23 | End: 2023-08-23 | Stop reason: SURG

## 2023-08-23 RX ORDER — MIDAZOLAM HYDROCHLORIDE 1 MG/ML
1 INJECTION INTRAMUSCULAR; INTRAVENOUS
Status: DISCONTINUED | OUTPATIENT
Start: 2023-08-23 | End: 2023-08-23 | Stop reason: HOSPADM

## 2023-08-23 RX ORDER — HYDRALAZINE HYDROCHLORIDE 20 MG/ML
5 INJECTION INTRAMUSCULAR; INTRAVENOUS
Status: DISCONTINUED | OUTPATIENT
Start: 2023-08-23 | End: 2023-08-23 | Stop reason: HOSPADM

## 2023-08-23 RX ORDER — ROCURONIUM BROMIDE 10 MG/ML
INJECTION, SOLUTION INTRAVENOUS PRN
Status: DISCONTINUED | OUTPATIENT
Start: 2023-08-23 | End: 2023-08-23 | Stop reason: SURG

## 2023-08-23 RX ORDER — SUCCINYLCHOLINE CHLORIDE 20 MG/ML
INJECTION INTRAMUSCULAR; INTRAVENOUS PRN
Status: DISCONTINUED | OUTPATIENT
Start: 2023-08-23 | End: 2023-08-23 | Stop reason: SURG

## 2023-08-23 RX ORDER — LIDOCAINE HYDROCHLORIDE AND EPINEPHRINE 10; 10 MG/ML; UG/ML
INJECTION, SOLUTION INFILTRATION; PERINEURAL
Status: DISCONTINUED | OUTPATIENT
Start: 2023-08-23 | End: 2023-08-23 | Stop reason: HOSPADM

## 2023-08-23 RX ORDER — HALOPERIDOL 5 MG/ML
1 INJECTION INTRAMUSCULAR
Status: DISCONTINUED | OUTPATIENT
Start: 2023-08-23 | End: 2023-08-23 | Stop reason: HOSPADM

## 2023-08-23 RX ORDER — HYDROMORPHONE HYDROCHLORIDE 1 MG/ML
0.2 INJECTION, SOLUTION INTRAMUSCULAR; INTRAVENOUS; SUBCUTANEOUS
Status: DISCONTINUED | OUTPATIENT
Start: 2023-08-23 | End: 2023-08-23 | Stop reason: HOSPADM

## 2023-08-23 RX ORDER — DIPHENHYDRAMINE HYDROCHLORIDE 50 MG/ML
12.5 INJECTION INTRAMUSCULAR; INTRAVENOUS
Status: DISCONTINUED | OUTPATIENT
Start: 2023-08-23 | End: 2023-08-23 | Stop reason: HOSPADM

## 2023-08-23 RX ORDER — BACITRACIN ZINC 500 [USP'U]/G
OINTMENT TOPICAL
Status: DISPENSED
Start: 2023-08-23 | End: 2023-08-24

## 2023-08-23 RX ORDER — MEPERIDINE HYDROCHLORIDE 25 MG/ML
12.5 INJECTION INTRAMUSCULAR; INTRAVENOUS; SUBCUTANEOUS
Status: DISCONTINUED | OUTPATIENT
Start: 2023-08-23 | End: 2023-08-23 | Stop reason: HOSPADM

## 2023-08-23 RX ORDER — MIDAZOLAM HYDROCHLORIDE 1 MG/ML
INJECTION INTRAMUSCULAR; INTRAVENOUS PRN
Status: DISCONTINUED | OUTPATIENT
Start: 2023-08-23 | End: 2023-08-23 | Stop reason: SURG

## 2023-08-23 RX ORDER — DEXAMETHASONE SODIUM PHOSPHATE 4 MG/ML
6 INJECTION, SOLUTION INTRA-ARTICULAR; INTRALESIONAL; INTRAMUSCULAR; INTRAVENOUS; SOFT TISSUE EVERY 6 HOURS
Status: DISCONTINUED | OUTPATIENT
Start: 2023-08-23 | End: 2023-08-25 | Stop reason: HOSPADM

## 2023-08-23 RX ORDER — SODIUM CHLORIDE, SODIUM LACTATE, POTASSIUM CHLORIDE, CALCIUM CHLORIDE 600; 310; 30; 20 MG/100ML; MG/100ML; MG/100ML; MG/100ML
INJECTION, SOLUTION INTRAVENOUS
Status: DISCONTINUED | OUTPATIENT
Start: 2023-08-23 | End: 2023-08-23 | Stop reason: SURG

## 2023-08-23 RX ORDER — LABETALOL HYDROCHLORIDE 5 MG/ML
5 INJECTION, SOLUTION INTRAVENOUS
Status: DISCONTINUED | OUTPATIENT
Start: 2023-08-23 | End: 2023-08-23 | Stop reason: HOSPADM

## 2023-08-23 RX ADMIN — SUGAMMADEX 200 MG: 100 INJECTION, SOLUTION INTRAVENOUS at 16:21

## 2023-08-23 RX ADMIN — AMPICILLIN AND SULBACTAM 3 G: 1; 2 INJECTION, POWDER, FOR SOLUTION INTRAMUSCULAR; INTRAVENOUS at 11:29

## 2023-08-23 RX ADMIN — HYDROMORPHONE HYDROCHLORIDE 0.5 MG: 1 INJECTION, SOLUTION INTRAMUSCULAR; INTRAVENOUS; SUBCUTANEOUS at 11:28

## 2023-08-23 RX ADMIN — DEXAMETHASONE SODIUM PHOSPHATE 8 MG: 4 INJECTION INTRA-ARTICULAR; INTRALESIONAL; INTRAMUSCULAR; INTRAVENOUS; SOFT TISSUE at 15:48

## 2023-08-23 RX ADMIN — AMPICILLIN AND SULBACTAM 3 G: 1; 2 INJECTION, POWDER, FOR SOLUTION INTRAMUSCULAR; INTRAVENOUS at 06:25

## 2023-08-23 RX ADMIN — OXYCODONE HYDROCHLORIDE 5 MG: 5 TABLET ORAL at 09:01

## 2023-08-23 RX ADMIN — FENTANYL CITRATE 50 MCG: 0.05 INJECTION, SOLUTION INTRAMUSCULAR; INTRAVENOUS at 16:57

## 2023-08-23 RX ADMIN — SUCCINYLCHOLINE CHLORIDE 140 MG: 20 INJECTION, SOLUTION INTRAMUSCULAR; INTRAVENOUS at 15:48

## 2023-08-23 RX ADMIN — PROPOFOL 150 MG: 10 INJECTION, EMULSION INTRAVENOUS at 15:48

## 2023-08-23 RX ADMIN — VANCOMYCIN HYDROCHLORIDE 1000 MG: 5 INJECTION, POWDER, LYOPHILIZED, FOR SOLUTION INTRAVENOUS at 12:28

## 2023-08-23 RX ADMIN — LIDOCAINE HYDROCHLORIDE 100 MG: 20 INJECTION, SOLUTION EPIDURAL; INFILTRATION; INTRACAUDAL at 15:48

## 2023-08-23 RX ADMIN — VANCOMYCIN HYDROCHLORIDE 1000 MG: 5 INJECTION, POWDER, LYOPHILIZED, FOR SOLUTION INTRAVENOUS at 04:17

## 2023-08-23 RX ADMIN — FENTANYL CITRATE 100 MCG: 50 INJECTION, SOLUTION INTRAMUSCULAR; INTRAVENOUS at 15:48

## 2023-08-23 RX ADMIN — FENTANYL CITRATE 100 MCG: 50 INJECTION, SOLUTION INTRAMUSCULAR; INTRAVENOUS at 16:16

## 2023-08-23 RX ADMIN — AMPICILLIN AND SULBACTAM 3 G: 1; 2 INJECTION, POWDER, FOR SOLUTION INTRAMUSCULAR; INTRAVENOUS at 00:16

## 2023-08-23 RX ADMIN — AMPICILLIN AND SULBACTAM 3 G: 1; 2 INJECTION, POWDER, FOR SOLUTION INTRAMUSCULAR; INTRAVENOUS at 18:51

## 2023-08-23 RX ADMIN — ONDANSETRON 4 MG: 2 INJECTION INTRAMUSCULAR; INTRAVENOUS at 16:16

## 2023-08-23 RX ADMIN — ROCURONIUM BROMIDE 20 MG: 50 INJECTION, SOLUTION INTRAVENOUS at 16:00

## 2023-08-23 RX ADMIN — OXYCODONE HYDROCHLORIDE 5 MG: 5 TABLET ORAL at 03:21

## 2023-08-23 RX ADMIN — KETOROLAC TROMETHAMINE 30 MG: 30 INJECTION, SOLUTION INTRAMUSCULAR; INTRAVENOUS at 16:16

## 2023-08-23 RX ADMIN — SODIUM CHLORIDE, POTASSIUM CHLORIDE, SODIUM LACTATE AND CALCIUM CHLORIDE: 600; 310; 30; 20 INJECTION, SOLUTION INTRAVENOUS at 15:43

## 2023-08-23 RX ADMIN — MIDAZOLAM 2 MG: 1 INJECTION, SOLUTION INTRAMUSCULAR; INTRAVENOUS at 15:43

## 2023-08-23 RX ADMIN — HYDROMORPHONE HYDROCHLORIDE 0.5 MG: 1 INJECTION, SOLUTION INTRAMUSCULAR; INTRAVENOUS; SUBCUTANEOUS at 06:30

## 2023-08-23 RX ADMIN — OXYCODONE HYDROCHLORIDE 10 MG: 5 SOLUTION ORAL at 16:54

## 2023-08-23 RX ADMIN — DEXAMETHASONE SODIUM PHOSPHATE 6 MG: 4 INJECTION INTRA-ARTICULAR; INTRALESIONAL; INTRAMUSCULAR; INTRAVENOUS; SOFT TISSUE at 21:21

## 2023-08-23 ASSESSMENT — ENCOUNTER SYMPTOMS
STRIDOR: 0
COUGH: 0
INSOMNIA: 0
SORE THROAT: 0
SEIZURES: 0
ABDOMINAL PAIN: 0
LOSS OF CONSCIOUSNESS: 0
FEVER: 0
SHORTNESS OF BREATH: 0
PALPITATIONS: 0

## 2023-08-23 ASSESSMENT — PAIN DESCRIPTION - PAIN TYPE
TYPE: SURGICAL PAIN
TYPE: SURGICAL PAIN
TYPE: ACUTE PAIN
TYPE: SURGICAL PAIN

## 2023-08-23 ASSESSMENT — COGNITIVE AND FUNCTIONAL STATUS - GENERAL
MOBILITY SCORE: 24
DAILY ACTIVITIY SCORE: 24
SUGGESTED CMS G CODE MODIFIER DAILY ACTIVITY: CH
SUGGESTED CMS G CODE MODIFIER MOBILITY: CH

## 2023-08-23 ASSESSMENT — PAIN SCALES - GENERAL: PAIN_LEVEL: 5

## 2023-08-23 NOTE — CARE PLAN
The patient is Stable - Low risk of patient condition declining or worsening    Shift Goals  Clinical Goals: Continue IV abx and fluids  Patient Goals: Rest  Family Goals: No family at bedside    Progress made toward(s) clinical / shift goals:    Problem: Knowledge Deficit - Standard  Goal: Patient and family/care givers will demonstrate understanding of plan of care, disease process/condition, diagnostic tests and medications  Outcome: Progressing  Note: Updated pt POC: monitor penrose drain and continue IV fluids and IV abx.    Problem: Pain - Standard  Goal: Alleviation of pain or a reduction in pain to the patient’s comfort goal  Outcome: Progressing  Note: Pt will verbalized pain was controlled < 5 in 0/10 rating scale during this shift.         Patient is not progressing towards the following goals:

## 2023-08-23 NOTE — ANESTHESIA PREPROCEDURE EVALUATION
Case: [id number] Date/Time: 23 1456    Procedure: INCISION AND DRAINAGE NECK ABCESS    Location: [facility] RO[facility] / SURGERY SAME DAY [facility]    Surgeons: Maverick Benson M.D.          Relevant Problems   OB   (positive)  labor       Physical Exam    Airway   Mallampati: III  TM distance: >3 FB  Neck ROM: limited    Comments: Limited ability to open mouth   Cardiovascular - normal exam  Rhythm: regular  Rate: normal  (-) murmur     Dental - normal exam           Pulmonary - normal exam  Breath sounds clear to auscultation     Abdominal    Neurological - normal exam               Anesthesia Plan    ASA 3   ASA physical status 3 criteria: alcohol and/or substance dependence or abuse    Plan - general       Airway plan will be ETT          Induction: intravenous    Postoperative Plan: Postoperative administration of opioids is intended.    Pertinent diagnostic labs and testing reviewed    Informed Consent:    Anesthetic plan and risks discussed with patient.    Use of blood products discussed with: patient whom consented to blood products.

## 2023-08-23 NOTE — PROGRESS NOTES
Assumed pt care at shift change, report received from day RN. Pt A&O x4, pt reports 0/10 pain at this time. Pt sitting up in bed eating dinner. Assessed dressing on the left neck, noted a small serosanguineous drainage from the penrose drain. Updated pt on POC, communication board updated. Bed locked and in lowest position. Call light and belongings within reach. Non-skid socks in place. Needs met at this time, will continue to monitor.

## 2023-08-23 NOTE — OP REPORT
DATE OF SERVICE:  08/23/2023      PREOPERATIVE DIAGNOSIS:  Left neck abscess.     POSTOPERATIVE DIAGNOSIS:  Left neck abscess.     PROCEDURE PERFORMED:  Incision and drainage of left neck abscess.    SURGEON: Maverick Benson MD    ASSISTANT SURGEON: Sammie Washington MD     ANESTHESIA:  General endotracheal.     ANESTHESIOLOGIST:  Clif Matos MD     INDICATIONS FOR PROCEDURE:  The patient is a 28-year-old female who has had one week of left neck swelling.  She had a CT scan at outside hospital showing a necrotic lymph node.  She failed IV antibiotics and was taken to the operating room 4 days ago for an incision and drainage of a retropharyngeal abscess.  She felt better for one day, but then her symptoms returned.  Repeat CT scan showed a larger left neck abscess, so she had an I&D 2 days ago.  She continued to have pain and swelling so a CT was repeated showing continue fluid. Risks, benefits and alternatives were discussed with the patient preoperatively who gave her informed consent.     OPERATIVE FINDINGS:  No purulence despite extensive probing. Consensus is that the necrotic lymph node has no purulence.     PROCEDURE IN DETAIL:  The patient was identified in the preoperative holding   area and brought to the operating room.  She was intubated without difficulty   and a timeout was satisfactorily completed.  3 cc of 1% lidocaine with 1-100,000 epinephrine was injected around the prior incision.  Incision was extended 1 cm with a 15 blade scalpel and taken down to the   platysma.  Blunt dissection was used to probe the area approximately 2-3 cm   beneath the skin.  The cavity was extensively probed but no purulence returned.  Blunt instrumentation was used to probe all around the area   looking for more purulence, but none could be expressed.  A piece of tissue from deep in the wound was removed and sent for permanent specimen.  The wound was irrigated with Ancef irrigation and a quarter-inch Penrose was  placed and secured with a 3-0 silk nylon.  The patient was extubated and brought to the postanesthesia care unit in good condition.     ESTIMATED BLOOD LOSS:  10 mL.     COMPLICATIONS:  None.     SPECIMENS REMOVED: Deep neck tissue for permanent     DRAIN:  Quarter-inch Penrose.

## 2023-08-23 NOTE — PROGRESS NOTES
Received bedside report and accepted care of patient.  Patient currently resting in bed in no visible or stated distress.  Bed controls on and bed in locked position.  Call light and personal possessions within reach.  Plan of care to include IV antibiotics, pain management, and assistance with ADL's as needed.  Patient verbalizes agreement with plan of care, and has no additional questions or concerns at this time.  Will continue to update notes/plan of care as needed throughout shift.

## 2023-08-23 NOTE — ANESTHESIA PROCEDURE NOTES
Airway    Date/Time: 8/23/2023 3:49 PM    Performed by: Clif Matos M.D.  Authorized by: Clif Matos M.D.    Location:  OR  Urgency:  Elective  Indications for Airway Management:  Anesthesia      Spontaneous Ventilation: absent    Sedation Level:  Deep  Preoxygenated: Yes    Patient Position:  Sniffing  Mask Difficulty Assessment:  1 - vent by mask  Final Airway Type:  Endotracheal airway  Final Endotracheal Airway:  ETT  Cuffed: Yes    Technique Used for Successful ETT Placement:  Video laryngoscopy    Insertion Site:  Oral  Blade Type:  Glide  Laryngoscope Blade/Videolaryngoscope Blade Size:  3 (SPro)  ETT Size (mm):  7.0  Measured from:  Teeth  ETT to Teeth (cm):  22  Placement Verified by: auscultation and capnometry    Cormack-Lehane Classification:  Grade I - full view of glottis  Number of Attempts at Approach:  1

## 2023-08-23 NOTE — PROGRESS NOTES
Pharmacy Vancomycin Kinetics Note for 8/22/2023     28 y.o. female on Vancomycin day # 1     Vancomycin Indication (AUC Dosing): Skin/skin structure infection    Provider specified end date: 08/28/23    Active Antibiotics (From admission, onward)      Ordered     Ordering Provider       Tue Aug 22, 2023  9:51 PM    08/22/23 2151  vancomycin (Vancocin) 1,000 mg in  mL IVPB  (vancomycin (VANCOCIN) IV (LD + Maintenance))  EVERY 8 HOURS         Rio Walton D.O.       Tue Aug 22, 2023  9:30 PM    08/22/23 2130  MD Alert...Vancomycin per Pharmacy  PHARMACY TO DOSE        Question:  Indication(s) for vancomycin?  Answer:  Skin and soft tissue infection    Naveed Oleary D.O.       Fri Aug 18, 2023  5:12 AM    08/18/23 0512  ampicillin/sulbactam (Unasyn) 3 g in  mL IVPB  EVERY 6 HOURS         Rio Walton D.O.            Dosing Weight: 74 kg (163 lb 2.3 oz)      Admission History: Admitted on 8/18/2023 for Sepsis (HCC) [A41.9]  Pertinent history: Persistent neck abscess s/p I&D with drain placement    Allergies:     Patient has no known allergies.     Pertinent cultures to date:     Results       Procedure Component Value Units Date/Time    CULTURE WOUND W/ GRAM STAIN [[id number]]  (Abnormal) Collected: 08/19/23 2047    Order Status: Completed Specimen: Wound Updated: 08/22/23 1920     Significant Indicator POS     Source WND     Site Left Neck Aspirate     Culture Result -     Gram Stain Result Many WBCs.  Many Gram positive cocci.       Culture Result Streptococcus pyogenes (Group A)  Moderate growth  Isolated from anaerobic culture      Narrative:      CALL  [facility]  61 tel. [phone],  CALLED  61 tel. [phone] 08/22/2023, 19:16, RB PERF. RESULTS CALLED TO:[id number]    Anaerobic Culture [[id number]] Collected: 08/19/23 2047    Order Status: Completed Specimen: Wound Updated: 08/22/23 1920     Significant Indicator NEG     Source WND     Site Left Neck Aspirate     Culture Result Culture in progress.     "Narrative:      CALL  O[facility]  61 tel. [phone],  CALLED  61 tel. [phone] 08/22/2023, 19:16, RB PERF. RESULTS CALLED TO:[phone]    GRAM STAIN [[id number]] Collected: 08/19/23 2047    Order Status: Completed Specimen: Wound Updated: 08/20/23 1933     Significant Indicator .     Source WND     Site Left Neck Aspirate     Gram Stain Result Many WBCs.  Many Gram positive cocci.      BLOOD CULTURE [[id number]] Collected: 08/18/23 0840    Order Status: Completed Specimen: Blood from Peripheral Updated: 08/19/23 0825     Significant Indicator NEG     Source BLD     Site PERIPHERAL     Culture Result No Growth  Note: Blood cultures are incubated for 5 days and  are monitored continuously.Positive blood cultures  are called to the RN and reported as soon as  they are identified.      Narrative:      Per Hospital Policy: Only change Specimen Src: to \"Line\" if  specified by physician order.  Release to patient->Immediate  Right Forearm/Arm    BLOOD CULTURE [[id number]] Collected: 08/18/23 0840    Order Status: Completed Specimen: Blood from Peripheral Updated: 08/19/23 0825     Significant Indicator NEG     Source BLD     Site PERIPHERAL     Culture Result No Growth  Note: Blood cultures are incubated for 5 days and  are monitored continuously.Positive blood cultures  are called to the RN and reported as soon as  they are identified.      Narrative:      Per Hospital Policy: Only change Specimen Src: to \"Line\" if  specified by physician order.  Release to patient->Immediate  Left Hand    MRSA By PCR (Amp) [[id number]] Collected: 08/18/23 0826    Order Status: Completed Specimen: Respirate from Nares Updated: 08/18/23 1402     MRSA by PCR Negative    Narrative:      Collected By: ALYSHA VEGA  Release to patient->Immediate    Urinalysis [[id number]]  (Abnormal) Collected: 08/18/23 0848    Order Status: Completed Specimen: Urine, Clean Catch Updated: 08/18/23 0938     Color Yellow     Character Clear     Specific Gravity " "1.026     Ph 7.5     Glucose 250 mg/dL      Ketones Negative mg/dL      Protein 30 mg/dL      Bilirubin Negative     Urobilinogen, Urine 1.0     Nitrite Negative     Leukocyte Esterase Negative     Occult Blood Negative     Micro Urine Req Microscopic    Narrative:      Collected By: ALYSHA VEGA  If not done within the last 24 hours  Release to patient->Immediate    Blood Culture [[id number]]     Order Status: No result Specimen: Blood from Peripheral     Blood Culture [[id number]]     Order Status: No result Specimen: Blood from Peripheral             Labs:     Estimated Creatinine Clearance: 184.2 mL/min (A) (by C-G formula based on SCr of 0.45 mg/dL (L)).  Recent Labs     23  01423  0102   WBC 20.8* 13.2* 15.4*   NEUTSPOLYS  --   --  91.10*     Recent Labs     23  01423  0102   BUN 5* 8 4*   CREATININE 0.37* 0.41* 0.45*   ALBUMIN 2.8*  --  3.1*       Intake/Output Summary (Last 24 hours) at 2023 2151  Last data filed at 2023 2104  Gross per 24 hour   Intake 1180 ml   Output --   Net 1180 ml      /72   Pulse 89   Temp 36.4 °C (97.6 °F) (Temporal)   Resp 18   Ht 1.702 m (5' 7\")   Wt 73.6 kg (162 lb 4.1 oz)   SpO2 95%  Temp (24hrs), Av.2 °C (97.1 °F), Min:36.1 °C (96.9 °F), Max:36.4 °C (97.6 °F)      List concerns for Vancomycin clearance:     None    Pharmacokinetics:     AUC kinetics:   Ke (hr ^-1): 0.1214 hr^-1  Half life: 5.71 hr  Clearance: 5.839  Estimated TDD: 2919.5  Estimated Dose: 937  Estimated interval: 7.7    A/P:     -  Vancomycin dose: Vanco 1000 mg q8h    -  Next vancomycin level(s): 2 days    -  Predicted vancomycin AUC from initial AUC test calculator: 514 mg·hr/L    -  Comments: Minimal concerns for accumulation, start dosing three times per day given age and expected clearance. Anticipate improvement with source control. MRSA nares negative.         Hany Soto, PharmD    "

## 2023-08-23 NOTE — CARE PLAN
The patient is Watcher - Medium risk of patient condition declining or worsening    Shift Goals  Clinical Goals: Repeat wound wash out  Patient Goals: Less pain and swelling  Family Goals: None present    Progress made toward(s) clinical / shift goals:    Problem: Knowledge Deficit - Standard  Goal: Patient and family/care givers will demonstrate understanding of plan of care, disease process/condition, diagnostic tests and medications  Outcome: Progressing     Problem: Pain - Standard  Goal: Alleviation of pain or a reduction in pain to the patient’s comfort goal  Outcome: Progressing     Problem: Respiratory  Goal: Patient will achieve/maintain optimum respiratory ventilation and gas exchange  Outcome: Progressing       Patient is not progressing towards the following goals:N/A

## 2023-08-23 NOTE — PROGRESS NOTES
"Otolaryngology Progress Note    ID: Karlie Santos is a 28 y.o. female admitted for neck infection    Events: Culture grew S. Pyogenes    S: Feeling a bit better this morning    O:  /69   Pulse 78   Temp 36.6 °C (97.9 °F) (Temporal)   Resp 16   Ht 1.702 m (5' 7\")   Wt 73.6 kg (162 lb 4.1 oz)   SpO2 96%   Gen: interactive and appropriate  Pulm: Breathing comfortably on RA without stridor or stertor  Face: symmetric, no edema, facial strength intact bilaterally  Eyes: conjunctiva clear, no chemosis. Vision grossly intact bilaterally  Neck: Drain in place with bloody drainage on the gauze, continued induration but less erythema    Recent Labs     08/21/23 0317 08/22/23 0102 08/23/23 0228   WBC 13.2* 15.4* 11.8*   RBC 3.18* 3.54* 3.32*   HEMOGLOBIN 9.6* 10.7* 10.0*   HEMATOCRIT 29.6* 33.0* 30.3*   MCV 93.1 93.2 91.3   MCH 30.2 30.2 30.1   MCHC 32.4 32.4 33.0   RDW 45.2 45.5 44.3   PLATELETCT 258 323 374   MPV 10.1 9.5 9.5     Recent Labs     08/21/23 0317 08/22/23 0102 08/23/23 0228   SODIUM 138 139 139   POTASSIUM 3.7 4.4 3.8   CHLORIDE 106 101 105   CO2 24 26 25   GLUCOSE 104* 133* 102*   BUN 8 4* 8   CREATININE 0.41* 0.45* 0.44*   CALCIUM 8.5 8.9 8.0*     CT neck 8/22/23:  IMPRESSION:  Interval placement of flexible drain in the fluid collection in the level 2 left neck.     There is acute bending of the drain which may impede drainage. No significant interval change in size of the fluid collection.    A/P: Karlie Santos is a 28 y.o. female with continued L neck abscess. Will plan for repeat washout today. Please keep NPO.    Please don't hesitate to call with questions or concerns.    Maverick Benson M.D.  [phone]   "

## 2023-08-23 NOTE — PROGRESS NOTES
"[facility] Internal Medicine Daily Progress Note    Date of Service  8/23/2023    [facility] Team: R IM Blue Team   Attending: STACIA Marcus M.d.  Senior Resident: Dr. Naveed Oleary  Intern:  Dr. Rio Walton  Contact Number: [phone]    Chief Complaint  Karlie Santos is a 28 y.o. female admitted 8/18/2023 with retropharyngeal abscess.     Hospital Course  Karlie Mccabe is a 28 year old female with history of methamphetamine and tobacco abuse who was transferred here on 8/18/23 for ENT consultation for neck abscess. CT soft tissue neck showed mildly worsening extensive inflammatory changes within the superficial and deep left neck with slightly increased retropharyngeal effusion, new superior mesenteric fat stranding suspicious for mediastinitis, necrosis of upper left cervical lymph node, and new mild thickening/edema of the epiglottis. Patient met sepsis criteria with tachycardia 115 and leukocytosis WBC 31. She received fluids, ceftriaxone, and vancomycin. Patient underwent I&D of the abscess on 8/19 and is on IV Unasyn. Patient had worsening swelling post op day 2 and repeat CT on 8/21 showed extensive suppurative adenopathy and abscess formation in left side of neck. She underwent another I&D of the neck and has a 1/4\" penrose drain in place. Her swelling and erythema appears to be unchanged after the 2nd I&D. Ultrasound of neck shows subcutaneous fluid collection on left side of the neck. Repeat CT soft tissue did not show significant interval change in size of fluid collection. Patient to get repeat washout today.       Interval Problem Update  Patient continues to have pain in neck. Tender to palpation. Denies fevers or chills. Is able to open her mouth without difficulty. No stridor or ear infection noted.     -S/p 2 I&D with drain in place  -Erythema and swelling unchanged  -Repeat CT shows no significant change in size of fluid collection  -ENT following; repeat washout today  -WBC downtrending 11.8 " today  -Wound cx grew strep pyogenes, continue unasyn, d/c vancomycin    I have discussed this patient's plan of care and discharge plan at IDT rounds today with Case Management, Nursing, Nursing leadership, and other members of the IDT team.    Consultants/Specialty  Otolaryngology    Code Status  Full Code    Disposition  Medically Cleared    Review of Systems  Review of Systems   Constitutional:  Negative for fever and malaise/fatigue.   HENT:  Negative for sore throat.    Respiratory:  Negative for cough, shortness of breath and stridor.    Cardiovascular:  Negative for chest pain, palpitations and leg swelling.   Gastrointestinal:  Negative for abdominal pain.   Neurological:  Negative for seizures and loss of consciousness.   Psychiatric/Behavioral:  The patient does not have insomnia.         Physical Exam  Temp:  [36.1 °C (96.9 °F)-36.6 °C (97.9 °F)] 36.6 °C (97.8 °F)  Pulse:  [78-89] 83  Resp:  [15-18] 16  BP: (104-115)/(63-73) 108/73  SpO2:  [94 %-98 %] 94 %    Physical Exam  Vitals and nursing note reviewed.   Constitutional:       General: She is not in acute distress.     Appearance: Normal appearance. She is normal weight.   HENT:      Head: Normocephalic and atraumatic.      Comments: Neck incision is tender with worsening edema      Right Ear: External ear normal.      Left Ear: External ear normal.   Cardiovascular:      Rate and Rhythm: Normal rate and regular rhythm.      Pulses: Normal pulses.      Heart sounds: Normal heart sounds. No murmur heard.  Pulmonary:      Effort: Pulmonary effort is normal. No respiratory distress.   Abdominal:      General: Abdomen is flat. There is no distension.   Musculoskeletal:         General: No swelling, deformity or signs of injury.   Skin:     Coloration: Skin is not jaundiced.   Neurological:      General: No focal deficit present.      Mental Status: She is alert and oriented to person, place, and time. Mental status is at baseline.   Psychiatric:          Mood and Affect: Mood normal.         Thought Content: Thought content normal.         Fluids    Intake/Output Summary (Last 24 hours) at 8/23/2023 1414  Last data filed at 8/22/2023 2104  Gross per 24 hour   Intake 700 ml   Output --   Net 700 ml       Laboratory  Recent Labs     08/21/23 0317 08/22/23  0102 08/23/23  0228   WBC 13.2* 15.4* 11.8*   RBC 3.18* 3.54* 3.32*   HEMOGLOBIN 9.6* 10.7* 10.0*   HEMATOCRIT 29.6* 33.0* 30.3*   MCV 93.1 93.2 91.3   MCH 30.2 30.2 30.1   MCHC 32.4 32.4 33.0   RDW 45.2 45.5 44.3   PLATELETCT 258 323 374   MPV 10.1 9.5 9.5     Recent Labs     08/21/23 0317 08/22/23  0102 08/23/23 0228   SODIUM 138 139 139   POTASSIUM 3.7 4.4 3.8   CHLORIDE 106 101 105   CO2 24 26 25   GLUCOSE 104* 133* 102*   BUN 8 4* 8   CREATININE 0.41* 0.45* 0.44*   CALCIUM 8.5 8.9 8.0*                     Imaging  CT-SOFT TISSUE NECK WITH   Final Result         Interval placement of flexible drain in the fluid collection in the level 2 left neck.      There is acute bending of the drain which may impede drainage. No significant interval change in size of the fluid collection.      US-SOFT TISSUES OF HEAD - NECK   Final Result      1.  3.5 x 3.2 x 1.9 cm subcutaneous fluid collection in the left side of the neck consistent with abscess, seroma, and/or hematoma.         CT-SOFT TISSUE NECK WITH   Final Result         1.  Extensive suppurative adenopathy along the internal jugular chain of nodes.      2.  3.2 x 1.8 x 3.1 cm focal area of abscess formation in the left side of the neck in the region of suppurtive adenopathy at level 2. Surgical incision and drainage should be considered.      3.  Multifocal groundglass opacity in the upper lobes bilaterally suspicious for pneumonitis possibly Covid.      CT-SOFT TISSUE NECK WITH   Final Result      1.  Probably mildly worsening extensive inflammatory changes within the superficial and deep left neck, with slightly increased retropharyngeal effusion and some new  "superior mesenteric fat stranding suspicious for mediastinitis.   2.  Asymmetric left cervical lymph nodes which are presumably reactive with associated necrosis of upper left cervical lymph node.   3.  New mild thickening/edema of the epiglottis.   4.  Small bilateral pleural effusions.      CT-FOREIGN FILM CAT SCAN   Final Result      EC-ECHOCARDIOGRAM COMPLETE W/O CONT    (Results Pending)        Assessment/Plan  Problem Representation:    Retropharyngeal abscess  Assessment & Plan  Likely secondary to IV drug use.  Will order echo to rule out endocarditis.  WBC downtrending, 11.8 today.   MRSA nares negative.  Wound cx reveals strep pyogenes. Continue unasyn  S/p 2 neck I&Ds with ENT. 1/4\" Penrose drain in place.  CT (8/22/23): no significant interval change in size of abscess  Swelling and erythema unchanged. Repeat washout today  -ENT following; repeat washout today  -Continue IV Unasyn  -pending echo  -pain management with oxy 5 mg and dilaudid         Acute lactic acidosis  Assessment & Plan  Now resolved with IV fluids    Tobacco dependence  Assessment & Plan  Tobacco cessation education provided for more than 3 minutes, discussed options of nicotine patch, medical treatment with wellbutrin and chantix. Discussed the risks of smoking including increased risk of heart disease, stroke, cancer and COPD      Lymphadenopathy  Assessment & Plan  CT at outside facility 23 x 15 mm left jugulodigastric lymphadenopathy with central hypoattenuation concerning for necrotic lymphadenopathy, multiple posterior cervical chain lymphadenopathy noted.   Status post drainage of retropharyngeal abscess x2  ENT following  CT (8/22/23): no enlarged lymph nodes of right neck  -continue IV Unasyn      Methamphetamine abuse (HCC)  Assessment & Plan  Encouraged sobriety          VTE prophylaxis: pharmacologic prophylaxis contraindicated due to potential need for repeat incision and drainage.    I have performed a physical exam and " reviewed and updated ROS and Plan today (8/23/2023). In review of yesterday's note (8/22/2023), there are no changes except as documented above.

## 2023-08-23 NOTE — ANESTHESIA TIME REPORT
Anesthesia Start and Stop Event Times     Date Time Event    8/23/2023 1450 Ready for Procedure     1543 Anesthesia Start     1630 Anesthesia Stop        Responsible Staff  08/23/23    Name Role Begin End    Clif Matos M.D. Anesth 1543 1630        Overtime Reason:  no overtime (within assigned shift)    Comments:

## 2023-08-23 NOTE — PROGRESS NOTES
Received a call from microbiology for a wound culture results read back to microbiology for a positive results of Streptococcus pyogenes ( Group A). Notified on call MD BILLY Carbajal for the wound culture results for  Streptococcus pyogenes( Group A).

## 2023-08-24 LAB
ALBUMIN SERPL BCP-MCNC: 3.3 G/DL (ref 3.2–4.9)
ALBUMIN/GLOB SERPL: 0.9 G/DL
ALP SERPL-CCNC: 129 U/L (ref 30–99)
ALT SERPL-CCNC: 27 U/L (ref 2–50)
ANION GAP SERPL CALC-SCNC: 11 MMOL/L (ref 7–16)
AST SERPL-CCNC: 20 U/L (ref 12–45)
BILIRUB SERPL-MCNC: <0.2 MG/DL (ref 0.1–1.5)
BUN SERPL-MCNC: 9 MG/DL (ref 8–22)
CALCIUM ALBUM COR SERPL-MCNC: 9.6 MG/DL (ref 8.5–10.5)
CALCIUM SERPL-MCNC: 9 MG/DL (ref 8.5–10.5)
CHLORIDE SERPL-SCNC: 99 MMOL/L (ref 96–112)
CO2 SERPL-SCNC: 27 MMOL/L (ref 20–33)
CREAT SERPL-MCNC: 0.54 MG/DL (ref 0.5–1.4)
ERYTHROCYTE [DISTWIDTH] IN BLOOD BY AUTOMATED COUNT: 43.8 FL (ref 35.9–50)
GFR SERPLBLD CREATININE-BSD FMLA CKD-EPI: 130 ML/MIN/1.73 M 2
GLOBULIN SER CALC-MCNC: 3.6 G/DL (ref 1.9–3.5)
GLUCOSE BLD STRIP.AUTO-MCNC: 185 MG/DL (ref 65–99)
GLUCOSE SERPL-MCNC: 164 MG/DL (ref 65–99)
HCT VFR BLD AUTO: 33.7 % (ref 37–47)
HGB BLD-MCNC: 11 G/DL (ref 12–16)
MCH RBC QN AUTO: 30.1 PG (ref 27–33)
MCHC RBC AUTO-ENTMCNC: 32.6 G/DL (ref 32.2–35.5)
MCV RBC AUTO: 92.1 FL (ref 81.4–97.8)
PATHOLOGY CONSULT NOTE: NORMAL
PLATELET # BLD AUTO: 427 K/UL (ref 164–446)
PMV BLD AUTO: 9.8 FL (ref 9–12.9)
POTASSIUM SERPL-SCNC: 4.1 MMOL/L (ref 3.6–5.5)
PROT SERPL-MCNC: 6.9 G/DL (ref 6–8.2)
RBC # BLD AUTO: 3.66 M/UL (ref 4.2–5.4)
SODIUM SERPL-SCNC: 137 MMOL/L (ref 135–145)
WBC # BLD AUTO: 10.8 K/UL (ref 4.8–10.8)

## 2023-08-24 PROCEDURE — 80053 COMPREHEN METABOLIC PANEL: CPT

## 2023-08-24 PROCEDURE — 700105 HCHG RX REV CODE 258

## 2023-08-24 PROCEDURE — 85027 COMPLETE CBC AUTOMATED: CPT

## 2023-08-24 PROCEDURE — 700111 HCHG RX REV CODE 636 W/ 250 OVERRIDE (IP): Performed by: HOSPITALIST

## 2023-08-24 PROCEDURE — 700111 HCHG RX REV CODE 636 W/ 250 OVERRIDE (IP): Mod: JZ

## 2023-08-24 PROCEDURE — 700105 HCHG RX REV CODE 258: Performed by: INTERNAL MEDICINE

## 2023-08-24 PROCEDURE — 82962 GLUCOSE BLOOD TEST: CPT

## 2023-08-24 PROCEDURE — 99232 SBSQ HOSP IP/OBS MODERATE 35: CPT | Mod: GC | Performed by: HOSPITALIST

## 2023-08-24 PROCEDURE — 770006 HCHG ROOM/CARE - MED/SURG/GYN SEMI*

## 2023-08-24 RX ADMIN — AMPICILLIN AND SULBACTAM 3 G: 1; 2 INJECTION, POWDER, FOR SOLUTION INTRAMUSCULAR; INTRAVENOUS at 00:05

## 2023-08-24 RX ADMIN — AMPICILLIN AND SULBACTAM 3 G: 1; 2 INJECTION, POWDER, FOR SOLUTION INTRAMUSCULAR; INTRAVENOUS at 18:28

## 2023-08-24 RX ADMIN — DEXAMETHASONE SODIUM PHOSPHATE 6 MG: 4 INJECTION INTRA-ARTICULAR; INTRALESIONAL; INTRAMUSCULAR; INTRAVENOUS; SOFT TISSUE at 21:52

## 2023-08-24 RX ADMIN — DEXAMETHASONE SODIUM PHOSPHATE 6 MG: 4 INJECTION INTRA-ARTICULAR; INTRALESIONAL; INTRAMUSCULAR; INTRAVENOUS; SOFT TISSUE at 10:15

## 2023-08-24 RX ADMIN — DEXAMETHASONE SODIUM PHOSPHATE 6 MG: 4 INJECTION INTRA-ARTICULAR; INTRALESIONAL; INTRAMUSCULAR; INTRAVENOUS; SOFT TISSUE at 16:01

## 2023-08-24 RX ADMIN — DEXAMETHASONE SODIUM PHOSPHATE 6 MG: 4 INJECTION INTRA-ARTICULAR; INTRALESIONAL; INTRAMUSCULAR; INTRAVENOUS; SOFT TISSUE at 04:54

## 2023-08-24 RX ADMIN — SODIUM CHLORIDE: 900 INJECTION INTRAVENOUS at 16:02

## 2023-08-24 RX ADMIN — AMPICILLIN AND SULBACTAM 3 G: 1; 2 INJECTION, POWDER, FOR SOLUTION INTRAMUSCULAR; INTRAVENOUS at 12:00

## 2023-08-24 RX ADMIN — AMPICILLIN AND SULBACTAM 3 G: 1; 2 INJECTION, POWDER, FOR SOLUTION INTRAMUSCULAR; INTRAVENOUS at 04:55

## 2023-08-24 ASSESSMENT — ENCOUNTER SYMPTOMS
SORE THROAT: 0
SEIZURES: 0
COUGH: 0
ABDOMINAL PAIN: 0
FEVER: 0
LOSS OF CONSCIOUSNESS: 0
INSOMNIA: 0
SHORTNESS OF BREATH: 0
STRIDOR: 0
PALPITATIONS: 0

## 2023-08-24 NOTE — PROGRESS NOTES
"Otolaryngology Progress Note    ID: Karlie Santos is a 28 y.o. female admitted for L neck infection    Events: To OR yesterday for washout    S: Much better today, able to eat and open her mouth    O:  /74   Pulse 79   Temp 36.9 °C (98.4 °F) (Temporal)   Resp 15   Ht 1.702 m (5' 7\")   Wt 73.6 kg (162 lb 4.1 oz)   SpO2 98%   Gen: interactive and appropriate  Pulm: Breathing comfortably on RA without stridor or stertor  Face: symmetric, no edema, facial strength intact bilaterally  Eyes: conjunctiva clear, no chemosis. Vision grossly intact bilaterally  Ears: pinna normal. Hearing grossly intact  Nose: patent, with moist mucosa. no purulence or edema.   OC/OP: poor dentition  Neck: improved edema and erythema, drain removed    Recent Labs     08/22/23  0102 08/23/23  0228 08/24/23  0100   WBC 15.4* 11.8* 10.8   RBC 3.54* 3.32* 3.66*   HEMOGLOBIN 10.7* 10.0* 11.0*   HEMATOCRIT 33.0* 30.3* 33.7*   MCV 93.2 91.3 92.1   MCH 30.2 30.1 30.1   MCHC 32.4 33.0 32.6   RDW 45.5 44.3 43.8   PLATELETCT 323 374 427   MPV 9.5 9.5 9.8     Recent Labs     08/22/23  0102 08/23/23  0228 08/24/23  0100   SODIUM 139 139 137   POTASSIUM 4.4 3.8 4.1   CHLORIDE 101 105 99   CO2 26 25 27   GLUCOSE 133* 102* 164*   BUN 4* 8 9   CREATININE 0.45* 0.44* 0.54   CALCIUM 8.9 8.0* 9.0         A/P:Karlie Santos is a 28 y.o. female with L neck infection s/p multiple I&Ds, now with normal WBC and improving clinical signs. Drain removed on rounds. Ok to discharge tomorrow if doing well. She can f'u with me in 2 weeks prn.     Please don't hesitate to call with questions or concerns.    Maverick Benson M.D.  [phone]   "

## 2023-08-24 NOTE — CARE PLAN
Problem: Knowledge Deficit - Standard  Goal: Patient and family/care givers will demonstrate understanding of plan of care, disease process/condition, diagnostic tests and medications  Outcome: Progressing     Problem: Pain - Standard  Goal: Alleviation of pain or a reduction in pain to the patient’s comfort goal  Outcome: Progressing     Problem: Hemodynamics  Goal: Patient's hemodynamics, fluid balance and neurologic status will be stable or improve  Outcome: Progressing     Problem: Fluid Volume  Goal: Fluid volume balance will be maintained  Outcome: Progressing     Problem: Urinary - Renal Perfusion  Goal: Ability to achieve and maintain adequate renal perfusion and functioning will improve  Outcome: Progressing     Problem: Respiratory  Goal: Patient will achieve/maintain optimum respiratory ventilation and gas exchange  Outcome: Progressing     Problem: Physical Regulation  Goal: Diagnostic test results will improve  Outcome: Progressing  Goal: Signs and symptoms of infection will decrease  Outcome: Progressing   The patient is Stable - Low risk of patient condition declining or worsening    Shift Goals  Clinical Goals: Repeat wound wash out  Patient Goals: Less pain and swelling  Family Goals: None present    Progress made toward(s) clinical / shift goals:  pt progressing toward goals    Patient is not progressing towards the following goals:

## 2023-08-24 NOTE — PROGRESS NOTES
"[facility] Internal Medicine Daily Progress Note    Date of Service  8/24/2023    [facility] Team: R IM Blue Team   Attending: STACIA Marcus M.d.  Senior Resident: Dr. Naveed Oleary  Intern:  Dr. Rio Walton  Contact Number: [phone]    Chief Complaint  Karlie Santos is a 28 y.o. female admitted 8/18/2023 with retropharyngeal abscess.     Hospital Course  Karlie Mccabe is a 28 year old female with history of methamphetamine and tobacco abuse who was transferred here on 8/18/23 for ENT consultation for neck abscess. CT soft tissue neck showed mildly worsening extensive inflammatory changes within the superficial and deep left neck with slightly increased retropharyngeal effusion, new superior mesenteric fat stranding suspicious for mediastinitis, necrosis of upper left cervical lymph node, and new mild thickening/edema of the epiglottis. Patient met sepsis criteria with tachycardia 115 and leukocytosis WBC 31. She received fluids, ceftriaxone, and vancomycin. Patient underwent I&D of the abscess on 8/19 and is on IV Unasyn. Patient had worsening swelling post op day 2 and repeat CT on 8/21 showed extensive suppurative adenopathy and abscess formation in left side of neck. She underwent another I&D of the neck and has a 1/4\" penrose drain in place. Her swelling and erythema appears to be unchanged after the 2nd I&D. Ultrasound of neck shows subcutaneous fluid collection on left side of the neck. Repeat CT soft tissue did not show significant interval change in size of fluid collection.  Patient underwent third washout where wound was irrigated with Ancef and quarter inch Penrose was repositioned.  No purulence expressed when probing area. Patient's swelling and pain greatly improved.  She is able to open her mouth widely and eat and drink without difficulty. Will continue on current antibiotic regimen and per ENT okay to discharge tomorrow if doing well.      Interval Problem Update  Pain and swelling greatly improved.  " Patient is more lively today, talking more.  Has been able to eat and drink without difficulty. No stridor noted    -S/p washout  -Erythema, swelling, and pain improved  -White count downtrending 10.8 today  -continue unasyn  -Continue Decadron 6 mg q6h  -Drain removed by ENT    I have discussed this patient's plan of care and discharge plan at IDT rounds today with Case Management, Nursing, Nursing leadership, and other members of the IDT team.    Consultants/Specialty  Otolaryngology    Code Status  Full Code    Disposition  The patient is not medically cleared for discharge to home or a post-acute facility.        Review of Systems  Review of Systems   Constitutional:  Negative for fever and malaise/fatigue.   HENT:  Negative for sore throat.    Respiratory:  Negative for cough, shortness of breath and stridor.    Cardiovascular:  Negative for chest pain, palpitations and leg swelling.   Gastrointestinal:  Negative for abdominal pain.   Neurological:  Negative for seizures and loss of consciousness.   Psychiatric/Behavioral:  The patient does not have insomnia.         Physical Exam  Temp:  [36.2 °C (97.1 °F)-36.9 °C (98.4 °F)] 36.9 °C (98.4 °F)  Pulse:  [79-95] 79  Resp:  [15-21] 15  BP: (108-129)/(67-74) 119/74  SpO2:  [93 %-100 %] 98 %    Physical Exam  Vitals and nursing note reviewed.   Constitutional:       General: She is not in acute distress.     Appearance: Normal appearance. She is normal weight.   HENT:      Head: Normocephalic and atraumatic.      Comments: Improving edema and erythema of left neck     Right Ear: External ear normal.      Left Ear: External ear normal.   Cardiovascular:      Rate and Rhythm: Normal rate and regular rhythm.      Pulses: Normal pulses.      Heart sounds: Normal heart sounds. No murmur heard.  Pulmonary:      Effort: Pulmonary effort is normal. No respiratory distress.   Abdominal:      General: Abdomen is flat. There is no distension.   Musculoskeletal:          General: No swelling, deformity or signs of injury.   Skin:     Coloration: Skin is not jaundiced.   Neurological:      General: No focal deficit present.      Mental Status: She is alert and oriented to person, place, and time. Mental status is at baseline.   Psychiatric:         Mood and Affect: Mood normal.         Thought Content: Thought content normal.         Fluids    Intake/Output Summary (Last 24 hours) at 8/24/2023 1625  Last data filed at 8/24/2023 0600  Gross per 24 hour   Intake 1683.06 ml   Output 25 ml   Net 1658.06 ml       Laboratory  Recent Labs     08/22/23  0102 08/23/23  0228 08/24/23  0100   WBC 15.4* 11.8* 10.8   RBC 3.54* 3.32* 3.66*   HEMOGLOBIN 10.7* 10.0* 11.0*   HEMATOCRIT 33.0* 30.3* 33.7*   MCV 93.2 91.3 92.1   MCH 30.2 30.1 30.1   MCHC 32.4 33.0 32.6   RDW 45.5 44.3 43.8   PLATELETCT 323 374 427   MPV 9.5 9.5 9.8     Recent Labs     08/22/23  0102 08/23/23 0228 08/24/23  0100   SODIUM 139 139 137   POTASSIUM 4.4 3.8 4.1   CHLORIDE 101 105 99   CO2 26 25 27   GLUCOSE 133* 102* 164*   BUN 4* 8 9   CREATININE 0.45* 0.44* 0.54   CALCIUM 8.9 8.0* 9.0                     Imaging  CT-SOFT TISSUE NECK WITH   Final Result         Interval placement of flexible drain in the fluid collection in the level 2 left neck.      There is acute bending of the drain which may impede drainage. No significant interval change in size of the fluid collection.      US-SOFT TISSUES OF HEAD - NECK   Final Result      1.  3.5 x 3.2 x 1.9 cm subcutaneous fluid collection in the left side of the neck consistent with abscess, seroma, and/or hematoma.         CT-SOFT TISSUE NECK WITH   Final Result         1.  Extensive suppurative adenopathy along the internal jugular chain of nodes.      2.  3.2 x 1.8 x 3.1 cm focal area of abscess formation in the left side of the neck in the region of suppurtive adenopathy at level 2. Surgical incision and drainage should be considered.      3.  Multifocal groundglass opacity  "in the upper lobes bilaterally suspicious for pneumonitis possibly Covid.      CT-SOFT TISSUE NECK WITH   Final Result      1.  Probably mildly worsening extensive inflammatory changes within the superficial and deep left neck, with slightly increased retropharyngeal effusion and some new superior mesenteric fat stranding suspicious for mediastinitis.   2.  Asymmetric left cervical lymph nodes which are presumably reactive with associated necrosis of upper left cervical lymph node.   3.  New mild thickening/edema of the epiglottis.   4.  Small bilateral pleural effusions.      CT-FOREIGN FILM CAT SCAN   Final Result      EC-ECHOCARDIOGRAM COMPLETE W/O CONT    (Results Pending)        Assessment/Plan  Problem Representation:    Retropharyngeal abscess  Assessment & Plan  Likely secondary to IV drug use.  Will order echo to rule out endocarditis.  WBC downtrending, 10.8 today.   MRSA nares negative.  Wound cx reveals strep pyogenes. Continue unasyn  S/p multiple I&Ds with ENT. 1/4\" Penrose drain removed  Swelling and erythema greatly improved.  Pain has greatly improved as well  Continued swelling from before could have been due to inflammed lymph node.  Decadron started 8/23  -ENT following; okay to discharge tomorrow if patient is doing well  -Continue IV Unasyn   -pain management with oxy 5 mg and dilaudid         Acute lactic acidosis  Assessment & Plan  Now resolved with IV fluids    Tobacco dependence  Assessment & Plan  Tobacco cessation education provided for more than 3 minutes, discussed options of nicotine patch, medical treatment with wellbutrin and chantix. Discussed the risks of smoking including increased risk of heart disease, stroke, cancer and COPD      Lymphadenopathy  Assessment & Plan  CT at outside facility 23 x 15 mm left jugulodigastric lymphadenopathy with central hypoattenuation concerning for necrotic lymphadenopathy, multiple posterior cervical chain lymphadenopathy noted.   Status post " drainage of retropharyngeal abscess   ENT following  CT (8/22/23): No significant interval change in size of fluid collection.   Suspect imaging may be mistaking lymph node for fluid collection.  Will start Decadron 6 mg for possible inflamed lymph node.  As this is a high dose of steroids, will obtain q6h fingersticks to monitor glucose.  Glucose after starting Decadron have been 164 and 185.   -Continue Decadron 6 mg q6h   -Fingerstick glucose checks      Methamphetamine abuse (HCC)  Assessment & Plan  Encouraged sobriety          VTE prophylaxis: SCDs    I have performed a physical exam and reviewed and updated ROS and Plan today (8/24/2023). In review of yesterday's note (8/23/2023), there are no changes except as documented above.

## 2023-08-24 NOTE — ANESTHESIA POSTPROCEDURE EVALUATION
Patient: Karlie Santos    Procedure Summary     Date: 08/23/23 Room / Location: [facility] ROOM 25 / SURGERY SAME DAY [facility]    Anesthesia Start: 1543 Anesthesia Stop: 1630    Procedure: INCISION AND DRAINAGE NECK ABCESS (Neck) Diagnosis: (LEFT NECK ABSCESS)    Surgeons: Maverick Benson M.D. Responsible Provider: Clif Matos M.D.    Anesthesia Type: general ASA Status: 3          Final Anesthesia Type: general  Last vitals  BP   Blood Pressure: 129/73    Temp   36.6 °C (97.8 °F)    Pulse   81   Resp   20    SpO2   93 %      Anesthesia Post Evaluation    Patient location during evaluation: PACU  Patient participation: complete - patient participated  Level of consciousness: awake and alert  Pain score: 5    Airway patency: patent  Anesthetic complications: no  Cardiovascular status: hemodynamically stable  Respiratory status: acceptable  Hydration status: euvolemic    PONV: none          No notable events documented.     Nurse Pain Score: 5 (NPRS)

## 2023-08-25 ENCOUNTER — PHARMACY VISIT (OUTPATIENT)
Dept: PHARMACY | Facility: MEDICAL CENTER | Age: 28
End: 2023-08-25
Payer: COMMERCIAL

## 2023-08-25 ENCOUNTER — APPOINTMENT (OUTPATIENT)
Dept: CARDIOLOGY | Facility: MEDICAL CENTER | Age: 28
DRG: 854 | End: 2023-08-25
Payer: MEDICAID

## 2023-08-25 VITALS
DIASTOLIC BLOOD PRESSURE: 69 MMHG | SYSTOLIC BLOOD PRESSURE: 112 MMHG | BODY MASS INDEX: 25.47 KG/M2 | TEMPERATURE: 97.4 F | RESPIRATION RATE: 18 BRPM | HEART RATE: 73 BPM | WEIGHT: 162.26 LBS | OXYGEN SATURATION: 99 % | HEIGHT: 67 IN

## 2023-08-25 PROBLEM — E87.21 ACUTE LACTIC ACIDOSIS: Status: RESOLVED | Noted: 2023-08-18 | Resolved: 2023-08-25

## 2023-08-25 LAB
ALBUMIN SERPL BCP-MCNC: 3.7 G/DL (ref 3.2–4.9)
ALBUMIN/GLOB SERPL: 0.9 G/DL
ALP SERPL-CCNC: 125 U/L (ref 30–99)
ALT SERPL-CCNC: 69 U/L (ref 2–50)
ANION GAP SERPL CALC-SCNC: 12 MMOL/L (ref 7–16)
AST SERPL-CCNC: 48 U/L (ref 12–45)
BILIRUB SERPL-MCNC: <0.2 MG/DL (ref 0.1–1.5)
BUN SERPL-MCNC: 14 MG/DL (ref 8–22)
CALCIUM ALBUM COR SERPL-MCNC: 10 MG/DL (ref 8.5–10.5)
CALCIUM SERPL-MCNC: 9.8 MG/DL (ref 8.5–10.5)
CHLORIDE SERPL-SCNC: 104 MMOL/L (ref 96–112)
CO2 SERPL-SCNC: 24 MMOL/L (ref 20–33)
CREAT SERPL-MCNC: 0.5 MG/DL (ref 0.5–1.4)
ERYTHROCYTE [DISTWIDTH] IN BLOOD BY AUTOMATED COUNT: 46.3 FL (ref 35.9–50)
GFR SERPLBLD CREATININE-BSD FMLA CKD-EPI: 132 ML/MIN/1.73 M 2
GLOBULIN SER CALC-MCNC: 3.9 G/DL (ref 1.9–3.5)
GLUCOSE BLD STRIP.AUTO-MCNC: 143 MG/DL (ref 65–99)
GLUCOSE BLD STRIP.AUTO-MCNC: 146 MG/DL (ref 65–99)
GLUCOSE SERPL-MCNC: 126 MG/DL (ref 65–99)
HCT VFR BLD AUTO: 36 % (ref 37–47)
HGB BLD-MCNC: 11.5 G/DL (ref 12–16)
MCH RBC QN AUTO: 30.4 PG (ref 27–33)
MCHC RBC AUTO-ENTMCNC: 31.9 G/DL (ref 32.2–35.5)
MCV RBC AUTO: 95.2 FL (ref 81.4–97.8)
PLATELET # BLD AUTO: 661 K/UL (ref 164–446)
PMV BLD AUTO: 9.2 FL (ref 9–12.9)
POTASSIUM SERPL-SCNC: 3.3 MMOL/L (ref 3.6–5.5)
PROT SERPL-MCNC: 7.6 G/DL (ref 6–8.2)
RBC # BLD AUTO: 3.78 M/UL (ref 4.2–5.4)
SODIUM SERPL-SCNC: 140 MMOL/L (ref 135–145)
WBC # BLD AUTO: 17.8 K/UL (ref 4.8–10.8)

## 2023-08-25 PROCEDURE — 99238 HOSP IP/OBS DSCHRG MGMT 30/<: CPT | Mod: GC | Performed by: HOSPITALIST

## 2023-08-25 PROCEDURE — 80053 COMPREHEN METABOLIC PANEL: CPT

## 2023-08-25 PROCEDURE — RXMED WILLOW AMBULATORY MEDICATION CHARGE

## 2023-08-25 PROCEDURE — 82962 GLUCOSE BLOOD TEST: CPT

## 2023-08-25 PROCEDURE — 85027 COMPLETE CBC AUTOMATED: CPT

## 2023-08-25 RX ORDER — AMOXICILLIN AND CLAVULANATE POTASSIUM 875; 125 MG/1; MG/1
1 TABLET, FILM COATED ORAL 2 TIMES DAILY
Qty: 14 TABLET | Refills: 0 | Status: ACTIVE | OUTPATIENT
Start: 2023-08-25 | End: 2023-09-01

## 2023-08-25 ASSESSMENT — PAIN DESCRIPTION - PAIN TYPE: TYPE: SURGICAL PAIN

## 2023-08-25 NOTE — PROGRESS NOTES
Received bedside report from nightshift RN, pt care assumed. VSS, pt assessment complete. Pt A&Ox4, no c/o pain at this time. POC discussed with pt and verbalizes no questions. Pt denies any additional needs at this time. Bed locked and in lowest position. Pt educated on fall risk and verbalized understanding, call light within reach, hourly rounding initiated.

## 2023-08-25 NOTE — DISCHARGE SUMMARY
[facility] Internal Medicine Discharge Summary    Attending: Dr. Marcus  Senior Resident: Dr. Oleary  Intern:  Dr. Walton  Contact Number: [phone]    CHIEF COMPLAINT ON ADMISSION  Neck pain    Reason for Admission  abscess    Admission Date  8/18/2023    CODE STATUS  Full code    HPI & HOSPITAL COURSE  Karlie Mccabe is a 28 year old female with history of methamphetamine and tobacco abuse who was transferred here on 8/18/23 for ENT consultation for neck abscess. CT soft tissue neck showed mildly worsening extensive inflammatory changes within the superficial and deep left neck with slightly increased retropharyngeal effusion, new superior mesenteric fat stranding suspicious for mediastinitis, necrosis of upper left cervical lymph node, and new mild thickening/edema of the epiglottis. Wound cultures grew strep pyogenes. ENT was consulted and patient underwent three I&Ds of the abscess and received IV Unasyn. Her swelling and pain has greatly improved. She is talking normally and eating and drinking without difficulty. Patient was counseled on methamphetamine and tobacco cessation. At this time, she is stable for discharge home on oral Augmentin.           Therefore, she is discharged in good and stable condition to home with close outpatient follow-up.    The patient met 2-midnight criteria for an inpatient stay at the time of discharge.    Discharge Date  8/25/2023    Physical Exam on Day of Discharge  Physical Exam  Constitutional:       Appearance: Normal appearance.   HENT:      Head: Normocephalic and atraumatic.      Comments: Incision on left neck clean, not purulent. Minimal swelling  Eyes:      Extraocular Movements: Extraocular movements intact.      Conjunctiva/sclera: Conjunctivae normal.   Cardiovascular:      Rate and Rhythm: Normal rate and regular rhythm.   Pulmonary:      Effort: No respiratory distress.      Breath sounds: Normal breath sounds. No wheezing.   Abdominal:      General: There is no  distension.      Palpations: Abdomen is soft.      Tenderness: There is no abdominal tenderness.   Musculoskeletal:      Cervical back: Neck supple.   Neurological:      General: No focal deficit present.      Mental Status: She is oriented to person, place, and time.         FOLLOW UP ITEMS POST DISCHARGE  Follow up with primary care regarding chronic illnesses and hospital stay    DISCHARGE DIAGNOSES  Principal Problem (Resolved):    Sepsis (HCC) (POA: Yes)  Active Problems:    Methamphetamine abuse (HCC) (POA: Unknown)    Lymphadenopathy (POA: Unknown)    Tobacco dependence (POA: Unknown)    Retropharyngeal abscess (POA: Unknown)  Resolved Problems:    Acute lactic acidosis (POA: Unknown)      FOLLOW UP  No future appointments.  No follow-up provider specified.    MEDICATIONS ON DISCHARGE     Medication List        START taking these medications        Instructions   * amoxicillin-clavulanate 875-125 MG Tabs  Commonly known as: Augmentin   Take 1 Tablet by mouth 2 times a day for 13 days.  Dose: 1 Tablet     * amoxicillin-clavulanate 875-125 MG Tabs  Commonly known as: Augmentin   Take 1 Tablet by mouth 2 times a day for 7 days.  Dose: 1 Tablet     cyclobenzaprine 5 mg tablet  Commonly known as: Flexeril   Take 1 Tablet by mouth 3 times a day as needed for Muscle Spasms, Mild Pain or Moderate Pain for up to 9 doses.  Dose: 5 mg     oxyCODONE immediate-release 5 MG Tabs  Commonly known as: Roxicodone   Take 1 Tablet by mouth every four hours as needed for Severe Pain for up to 3 days.  Dose: 5 mg           * This list has 2 medication(s) that are the same as other medications prescribed for you. Read the directions carefully, and ask your doctor or other care provider to review them with you.                  Allergies  No Known Allergies    DIET  No orders of the defined types were placed in this encounter.      ACTIVITY  As tolerated.  Weight bearing as  tolerated    CONSULTATIONS  ENT    PROCEDURES  I&D    LABORATORY  Lab Results   Component Value Date    SODIUM 140 08/25/2023    POTASSIUM 3.3 (L) 08/25/2023    CHLORIDE 104 08/25/2023    CO2 24 08/25/2023    GLUCOSE 126 (H) 08/25/2023    BUN 14 08/25/2023    CREATININE 0.50 08/25/2023        Lab Results   Component Value Date    WBC 17.8 (H) 08/25/2023    HEMOGLOBIN 11.5 (L) 08/25/2023    HEMATOCRIT 36.0 (L) 08/25/2023    PLATELETCT 661 (H) 08/25/2023        Total time of the discharge process exceeds 60 minutes.

## 2023-08-25 NOTE — PROGRESS NOTES
Discharge instructions given to patient; patient verbalizes understanding, all questions answered.  Copy of DC summary provided, signed copy in chart.  3 prescriptions electronically sent to pt's pharmacy/provided to patient, copies in chart.  Pt states personal belongings are in possession.  Pt escorted off unit self without incident.  Patient IV was discontinued prior to arrival.

## 2024-10-30 ENCOUNTER — HOSPITAL ENCOUNTER (OUTPATIENT)
Dept: LAB | Facility: MEDICAL CENTER | Age: 29
End: 2024-10-30
Attending: NURSE PRACTITIONER
Payer: MEDICAID

## 2024-10-30 ENCOUNTER — OFFICE VISIT (OUTPATIENT)
Dept: URGENT CARE | Facility: PHYSICIAN GROUP | Age: 29
End: 2024-10-30
Payer: MEDICAID

## 2024-10-30 VITALS
TEMPERATURE: 97.3 F | HEIGHT: 67 IN | BODY MASS INDEX: 27.62 KG/M2 | OXYGEN SATURATION: 97 % | DIASTOLIC BLOOD PRESSURE: 80 MMHG | RESPIRATION RATE: 16 BRPM | HEART RATE: 82 BPM | SYSTOLIC BLOOD PRESSURE: 126 MMHG | WEIGHT: 176 LBS

## 2024-10-30 DIAGNOSIS — H93.13 TINNITUS OF BOTH EARS: ICD-10-CM

## 2024-10-30 DIAGNOSIS — Z62.819 HISTORY OF ABUSE IN CHILDHOOD: ICD-10-CM

## 2024-10-30 DIAGNOSIS — R42 DIZZINESS: ICD-10-CM

## 2024-10-30 DIAGNOSIS — R29.6 FALLING EPISODES: ICD-10-CM

## 2024-10-30 LAB
ALBUMIN SERPL BCP-MCNC: 5 G/DL (ref 3.2–4.9)
ALBUMIN/GLOB SERPL: 1.8 G/DL
ALP SERPL-CCNC: 101 U/L (ref 30–99)
ALT SERPL-CCNC: 13 U/L (ref 2–50)
ANION GAP SERPL CALC-SCNC: 13 MMOL/L (ref 7–16)
AST SERPL-CCNC: 15 U/L (ref 12–45)
BASOPHILS # BLD AUTO: 0.7 % (ref 0–1.8)
BASOPHILS # BLD: 0.06 K/UL (ref 0–0.12)
BILIRUB SERPL-MCNC: <0.2 MG/DL (ref 0.1–1.5)
BUN SERPL-MCNC: 17 MG/DL (ref 8–22)
CALCIUM ALBUM COR SERPL-MCNC: 9.4 MG/DL (ref 8.5–10.5)
CALCIUM SERPL-MCNC: 10.2 MG/DL (ref 8.5–10.5)
CHLORIDE SERPL-SCNC: 100 MMOL/L (ref 96–112)
CO2 SERPL-SCNC: 24 MMOL/L (ref 20–33)
CREAT SERPL-MCNC: 0.5 MG/DL (ref 0.5–1.4)
EOSINOPHIL # BLD AUTO: 0.29 K/UL (ref 0–0.51)
EOSINOPHIL NFR BLD: 3.2 % (ref 0–6.9)
ERYTHROCYTE [DISTWIDTH] IN BLOOD BY AUTOMATED COUNT: 44.2 FL (ref 35.9–50)
GFR SERPLBLD CREATININE-BSD FMLA CKD-EPI: 131 ML/MIN/1.73 M 2
GLOBULIN SER CALC-MCNC: 2.8 G/DL (ref 1.9–3.5)
GLUCOSE SERPL-MCNC: 82 MG/DL (ref 65–99)
HCT VFR BLD AUTO: 42.4 % (ref 37–47)
HGB BLD-MCNC: 13.9 G/DL (ref 12–16)
IMM GRANULOCYTES # BLD AUTO: 0.03 K/UL (ref 0–0.11)
IMM GRANULOCYTES NFR BLD AUTO: 0.3 % (ref 0–0.9)
LYMPHOCYTES # BLD AUTO: 2.63 K/UL (ref 1–4.8)
LYMPHOCYTES NFR BLD: 29.2 % (ref 22–41)
MCH RBC QN AUTO: 31.7 PG (ref 27–33)
MCHC RBC AUTO-ENTMCNC: 32.8 G/DL (ref 32.2–35.5)
MCV RBC AUTO: 96.8 FL (ref 81.4–97.8)
MONOCYTES # BLD AUTO: 0.37 K/UL (ref 0–0.85)
MONOCYTES NFR BLD AUTO: 4.1 % (ref 0–13.4)
NEUTROPHILS # BLD AUTO: 5.62 K/UL (ref 1.82–7.42)
NEUTROPHILS NFR BLD: 62.5 % (ref 44–72)
NRBC # BLD AUTO: 0 K/UL
NRBC BLD-RTO: 0 /100 WBC (ref 0–0.2)
PLATELET # BLD AUTO: 233 K/UL (ref 164–446)
PMV BLD AUTO: 10.8 FL (ref 9–12.9)
POCT INT CON NEG: NEGATIVE
POCT INT CON POS: POSITIVE
POCT URINE PREGNANCY TEST: NEGATIVE
POTASSIUM SERPL-SCNC: 4.1 MMOL/L (ref 3.6–5.5)
PROT SERPL-MCNC: 7.8 G/DL (ref 6–8.2)
RBC # BLD AUTO: 4.38 M/UL (ref 4.2–5.4)
SODIUM SERPL-SCNC: 137 MMOL/L (ref 135–145)
WBC # BLD AUTO: 9 K/UL (ref 4.8–10.8)

## 2024-10-30 PROCEDURE — 85025 COMPLETE CBC W/AUTO DIFF WBC: CPT

## 2024-10-30 PROCEDURE — 36415 COLL VENOUS BLD VENIPUNCTURE: CPT

## 2024-10-30 PROCEDURE — 80053 COMPREHEN METABOLIC PANEL: CPT

## 2024-10-30 RX ORDER — LAMOTRIGINE 25 MG/1
25 TABLET ORAL 2 TIMES DAILY
COMMUNITY

## 2024-10-30 RX ORDER — TRAZODONE HYDROCHLORIDE 50 MG/1
TABLET, FILM COATED ORAL
COMMUNITY
Start: 2024-08-21

## 2024-10-30 RX ORDER — VALACYCLOVIR HYDROCHLORIDE 500 MG/1
TABLET, FILM COATED ORAL
COMMUNITY
Start: 2024-08-21

## 2024-10-30 RX ORDER — GABAPENTIN 400 MG/1
CAPSULE ORAL
COMMUNITY
Start: 2024-08-21

## 2024-10-30 ASSESSMENT — FIBROSIS 4 INDEX: FIB4 SCORE: 0.24

## 2024-10-30 ASSESSMENT — ENCOUNTER SYMPTOMS
SYNCOPE: 1
DIZZINESS: 1

## 2024-11-08 ENCOUNTER — APPOINTMENT (OUTPATIENT)
Dept: RADIOLOGY | Facility: MEDICAL CENTER | Age: 29
End: 2024-11-08
Attending: NURSE PRACTITIONER
Payer: MEDICAID

## 2024-11-19 ENCOUNTER — HOSPITAL ENCOUNTER (OUTPATIENT)
Dept: RADIOLOGY | Facility: MEDICAL CENTER | Age: 29
End: 2024-11-19
Attending: NURSE PRACTITIONER
Payer: MEDICAID

## 2024-11-19 DIAGNOSIS — R42 DIZZINESS: ICD-10-CM

## 2024-11-19 DIAGNOSIS — R29.6 FALLING EPISODES: ICD-10-CM

## 2024-11-19 DIAGNOSIS — Z62.819 HISTORY OF ABUSE IN CHILDHOOD: ICD-10-CM

## 2024-11-19 DIAGNOSIS — H93.13 TINNITUS OF BOTH EARS: ICD-10-CM

## 2024-11-19 PROCEDURE — 70450 CT HEAD/BRAIN W/O DYE: CPT

## 2024-11-23 ENCOUNTER — TELEPHONE (OUTPATIENT)
Dept: URGENT CARE | Facility: CLINIC | Age: 29
End: 2024-11-23
Payer: MEDICAID

## (undated) DEVICE — DRAPE U SPLIT IMP 54 X 76 - (24/CA)

## (undated) DEVICE — SUTURE 3-0 VICRYL PLUS SH - 27 INCH (36/BX)

## (undated) DEVICE — PACK MINOR BASIN - (2EA/CA)

## (undated) DEVICE — GLOVE BIOGEL PI INDICATOR SZ 6.5 SURGICAL PF LF - (50/BX 4BX/CA)

## (undated) DEVICE — SUTURE 3-0 CHROMIC GUT SH 27 (36PK/BX)"

## (undated) DEVICE — SYRINGE NON SAFETY TB 1 CC 27 GA X 1/2 IN (100/BX 8BX/CA)

## (undated) DEVICE — DRAPE MAYO STAND - (30/CA)

## (undated) DEVICE — KIT  I.V. START (100EA/CA)

## (undated) DEVICE — SPONGE GAUZESTER 4 X 4 4PLY - (128PK/CA)

## (undated) DEVICE — SODIUM CHL IRRIGATION 0.9% 1000ML (12EA/CA)

## (undated) DEVICE — SYRINGE SAFETY 10 ML 18 GA X 1 1/2 BLUNT LL (100/BX 4BX/CA)

## (undated) DEVICE — Device

## (undated) DEVICE — TOWELS CLOTH SURGICAL - (4/PK 20PK/CA)

## (undated) DEVICE — GLOVE BIOGEL SZ 7.5 SURGICAL PF LTX - (50PR/BX 4BX/CA)

## (undated) DEVICE — DRAPE LARGE 3 QUARTER - (20/CA)

## (undated) DEVICE — DRAIN PENROSE STERILE 1/4 X - 18 IN  (25EA/BX)

## (undated) DEVICE — TUBE CONNECTING SUCTION - CLEAR PLASTIC STERILE 72 IN (50EA/CA)

## (undated) DEVICE — COAGULATOR SUCTION L3 MR OD8 FR FOOTSWITCH CABLE FLEXIBLE SHAFT STERILE DISPOSABLE (10EA/BX)

## (undated) DEVICE — SUTURE 2-0 SILK SH (36PK/BX)

## (undated) DEVICE — CANNULA O2 COMFORT SOFT EAR ADULT 7 FT TUBING (50/CA)

## (undated) DEVICE — PENCIL ELECTSURG 10FT HLSTR - WITH BLADE (50EA/CA)

## (undated) DEVICE — MASK OXYGEN VNYL ADLT MED CONC WITH 7 FOOT TUBING  - (50EA/CA)

## (undated) DEVICE — CANISTER SUCTION RIGID RED 1500CC (40EA/CA)

## (undated) DEVICE — SYRINGE 10 ML CONTROL LL (25EA/BX 4BX/CA)

## (undated) DEVICE — PEN SKIN MARKER W/RULER - (50EA/BX)

## (undated) DEVICE — WATER IRRIGATION STERILE 1000ML (12EA/CA)

## (undated) DEVICE — SET LEADWIRE 5 LEAD BEDSIDE DISPOSABLE ECG (1SET OF 5/EA)

## (undated) DEVICE — GLOVE SZ 7.5 BIOGEL PI MICRO - PF LF (50PR/BX)

## (undated) DEVICE — GLOVE BIOGEL PI INDICATOR SZ 7.5 SURGICAL PF LF -(50/BX 4BX/CA)

## (undated) DEVICE — COVER LIGHT HANDLE ALC PLUS DISP (18EA/BX)

## (undated) DEVICE — CATHETER IV NON-SAFETY 18 GA X 1 1/4 (50/BX 4BX/CA)

## (undated) DEVICE — SUTURE 3-0 ETHILON PS-1 (36PK/BX)

## (undated) DEVICE — ELECTRODE DUAL RETURN W/ CORD - (50/PK)

## (undated) DEVICE — GOWN WARMING STANDARD FLEX - (30/CA)

## (undated) DEVICE — GLOVE SZ 6.5 BIOGEL PI MICRO - PF LF (50PR/BX)

## (undated) DEVICE — TRAY SRGPRP PVP IOD WT PRP - (20/CA)

## (undated) DEVICE — SET EXTENSION WITH 2 PORTS (48EA/CA) ***PART #2C8610 IS A SUBSTITUTE*****

## (undated) DEVICE — SENSOR OXIMETER ADULT SPO2 RD SET (20EA/BX)

## (undated) DEVICE — TUBING CLEARLINK DUO-VENT - C-FLO (48EA/CA)

## (undated) DEVICE — TUBE E-T HI-LO CUFF 6.5MM (10EA/BX)

## (undated) DEVICE — SYRINGE EAR/NOSE 3 OZ STERILE (50/CA

## (undated) DEVICE — SUCTION INSTRUMENT YANKAUER BULBOUS TIP W/O VENT (50EA/CA)

## (undated) DEVICE — TOWEL STOP TIMEOUT SAFETY FLAG (40EA/CA)

## (undated) DEVICE — SUTURE GENERAL

## (undated) DEVICE — SLEEVE VASO CALF MED - (10PR/CA)

## (undated) DEVICE — LACTATED RINGERS INJ 1000 ML - (14EA/CA 60CA/PF)

## (undated) DEVICE — MEDICINE CUP STERILE 2 OZ - (100/CA)

## (undated) DEVICE — CANISTER SUCTION 3000ML MECHANICAL FILTER AUTO SHUTOFF MEDI-VAC NONSTERILE LF DISP  (40EA/CA)

## (undated) DEVICE — PACK SINGLE BASIN - (6/CA)